# Patient Record
Sex: MALE | Employment: UNEMPLOYED | URBAN - METROPOLITAN AREA
[De-identification: names, ages, dates, MRNs, and addresses within clinical notes are randomized per-mention and may not be internally consistent; named-entity substitution may affect disease eponyms.]

---

## 2017-01-31 ENCOUNTER — COMMUNICATION - HEALTHEAST (OUTPATIENT)
Dept: FAMILY MEDICINE | Facility: CLINIC | Age: 37
End: 2017-01-31

## 2017-03-01 ENCOUNTER — COMMUNICATION - HEALTHEAST (OUTPATIENT)
Dept: FAMILY MEDICINE | Facility: CLINIC | Age: 37
End: 2017-03-01

## 2017-04-24 ENCOUNTER — TRANSFERRED RECORDS (OUTPATIENT)
Dept: HEALTH INFORMATION MANAGEMENT | Facility: CLINIC | Age: 37
End: 2017-04-24

## 2017-06-01 ENCOUNTER — COMMUNICATION - HEALTHEAST (OUTPATIENT)
Dept: FAMILY MEDICINE | Facility: CLINIC | Age: 37
End: 2017-06-01

## 2017-07-19 ENCOUNTER — COMMUNICATION - HEALTHEAST (OUTPATIENT)
Dept: FAMILY MEDICINE | Facility: CLINIC | Age: 37
End: 2017-07-19

## 2017-10-10 ENCOUNTER — COMMUNICATION - HEALTHEAST (OUTPATIENT)
Dept: FAMILY MEDICINE | Facility: CLINIC | Age: 37
End: 2017-10-10

## 2017-10-12 ENCOUNTER — TRANSFERRED RECORDS (OUTPATIENT)
Dept: HEALTH INFORMATION MANAGEMENT | Facility: CLINIC | Age: 37
End: 2017-10-12

## 2017-10-27 ENCOUNTER — COMMUNICATION - HEALTHEAST (OUTPATIENT)
Dept: SCHEDULING | Facility: CLINIC | Age: 37
End: 2017-10-27

## 2017-10-27 DIAGNOSIS — I10 ESSENTIAL HYPERTENSION: ICD-10-CM

## 2017-10-30 ENCOUNTER — COMMUNICATION - HEALTHEAST (OUTPATIENT)
Dept: FAMILY MEDICINE | Facility: CLINIC | Age: 37
End: 2017-10-30

## 2017-10-30 DIAGNOSIS — I10 ESSENTIAL HYPERTENSION: ICD-10-CM

## 2017-11-01 ENCOUNTER — OFFICE VISIT - HEALTHEAST (OUTPATIENT)
Dept: FAMILY MEDICINE | Facility: CLINIC | Age: 37
End: 2017-11-01

## 2017-11-01 DIAGNOSIS — I10 ESSENTIAL HYPERTENSION: ICD-10-CM

## 2017-11-01 DIAGNOSIS — F17.200 SMOKER: ICD-10-CM

## 2017-11-01 DIAGNOSIS — J06.9 URI (UPPER RESPIRATORY INFECTION): ICD-10-CM

## 2017-11-01 ASSESSMENT — MIFFLIN-ST. JEOR: SCORE: 1594.85

## 2017-11-07 ENCOUNTER — COMMUNICATION - HEALTHEAST (OUTPATIENT)
Dept: FAMILY MEDICINE | Facility: CLINIC | Age: 37
End: 2017-11-07

## 2017-11-15 ENCOUNTER — HOSPITAL ENCOUNTER (OUTPATIENT)
Dept: BEHAVIORAL HEALTH | Facility: CLINIC | Age: 37
Discharge: HOME | End: 2017-11-15
Attending: SOCIAL WORKER | Admitting: SOCIAL WORKER
Payer: COMMERCIAL

## 2017-11-15 PROCEDURE — H0001 ALCOHOL AND/OR DRUG ASSESS: HCPCS

## 2017-11-15 NOTE — PROGRESS NOTES
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    Do you have a preference for a particular treatment program? [facility]    Criteria for Diagnosis     Criteria for Diagnosis  DSM-5 Criteria for Substance Use Disorder  Instructions: Determine whether the client currently meets the criteria for Substance Use Disorder using the diagnostic criteria in the DSM-V pp.[id number]. Current means during the most recent 12 months outside a facility that controls access to substances    Category of Substance Severity (ICD-10 Code / DSM 5 Code)     Alcohol Use Disorder NA   Cannabis Use Disorder NA   Hallucinogen Use Disorder NA   Inhalant Use Disorder NA   Opioid Use Disorder NA   Sedative, Hypnotic, or Anxiolytic Use Disorder NA   Stimulant Related Disorder Severe   (F15.20) (304.40) Amphetamine type substance   Tobacco Use Disorder NA   Other (or unknown) Substance Use Disorder NA       Collateral Contact Summary   Number of contacts made: 1    Contact with referring person:  No.    If court related records were reviewed, summarize here: court records support client report, plus 1 disorderly conduct and numerous traffic violations (19 records total)    Information from collateral contacts supported/largely agreed with information from the client and  "associated risk ratings.      Rule 25 Assessment Summary and Plan   's Recommendation    1)  Client would benefit from outpatient treatment.  2)  Abstain from all mood-altering chemicals unless prescribed by a licensed provider.   3)  Attend weekly 12-step support group meetings.     4)  Actively work with a male sponsor or .  5)  Follow all the recommendations of your treatment/medical providers.  6)  Remain law abiding.  7)  Client will be referred to higher level of care if he cannot maintain abstinence throughout IOP treatment.      Collateral Contacts     Name:    Parish Suarez   Relationship:    girlfriend   Phone Number:    [phone] Releases:    Yes     Contact agrees that client probably uses 2-3x/week but she is not sure because he does not use around her.  She reports some strain in their relationship due to his use.  She denies knowing much about meth, having never used it herself and would possibly attend family sessions during treatment if her schedule allows.      Collateral Contacts     Name:    Jose Harding   Relationship:       Phone Number:    [phone]   Releases:    Yes     Contact reports client is a consistent user of meth and believes he requires inpatient treatment.  He consistently fails UA tests, and his last test 10/31/2017 was \"off the charts\".  He reports his last outpatient treatment referred him to a higher level of care.    Note:  Updated assessment dimensions received from [facility], the referenced treatment program, dated 11/06/2017 have no dimensions rated as 4.  Discharge summary from [facility], the program he recently left after 4 days attendance, rated client in Dimension 6 as risk 3 at intake and risk 4 at discharge.    ollateral Contacts      A problematic pattern of alcohol/drug use leading to clinically significant impairment or distress, as manifested by at least two of the following, occurring within " a 12-month period:    There is a persistent desire or unsuccessful efforts to cut down or control alcohol/drug use  Craving, or a strong desire or urge to use alcohol/drug  Recurrent alcohol/drug use resulting in a failure to fulfill major role obligations at work, school or home.  Continued alcohol use despite having persistent or recurrent social or interpersonal problems caused or exacerbated by the effects of alcohol/drug.  Important social, occupational, or recreational activities are given up or reduced because of alcohol/drug use.  Recurrent alcohol/drug use in situations in which it is physically hazardous.  Alcohol/drug use is continued despite knowledge of having a persistent or recurrent physical or psychological problem that is likely to have been caused or exacerbated by alcohol.  Withdrawal, as manifested by either of the following: The characteristic withdrawal syndrome for alcohol/drug (refer to Criteria A and B of the criteria set for alcohol/drug withdrawal).      Specify if: In early remission:  After full criteria for alcohol/drug use disorder were previously met, none of the criteria for alcohol/drug use disorder have been met for at least 3 months but for less than 12 months (with the exception that Criterion A4,  Craving or a strong desire or urge to use alcohol/drug  may be met).     In sustained remission:   After full criteria for alcohol use disorder were previously met, non of the criteria for alcohol/drug use disorder have been met at any time during a period of 12 months or longer (with the exception that Criterion A4,  Craving or strong desire or urge to use alcohol/drug  may be met).   Specify if:   This additional specifier is used if the individual is in an environment where access to alcohol is restricted.    Mild: Presence of 2-3 symptoms    Moderate: Presence of 4-5 symptoms    Severe: Presence of 6 or more symptoms

## 2017-11-16 RX ORDER — LOSARTAN POTASSIUM AND HYDROCHLOROTHIAZIDE 12.5; 5 MG/1; MG/1
1 TABLET ORAL DAILY
Status: ON HOLD | COMMUNITY
End: 2018-04-15

## 2017-11-16 ASSESSMENT — ANXIETY QUESTIONNAIRES
6. BECOMING EASILY ANNOYED OR IRRITABLE: MORE THAN HALF THE DAYS
5. BEING SO RESTLESS THAT IT IS HARD TO SIT STILL: SEVERAL DAYS
3. WORRYING TOO MUCH ABOUT DIFFERENT THINGS: MORE THAN HALF THE DAYS
1. FEELING NERVOUS, ANXIOUS, OR ON EDGE: MORE THAN HALF THE DAYS
7. FEELING AFRAID AS IF SOMETHING AWFUL MIGHT HAPPEN: NEARLY EVERY DAY
IF YOU CHECKED OFF ANY PROBLEMS ON THIS QUESTIONNAIRE, HOW DIFFICULT HAVE THESE PROBLEMS MADE IT FOR YOU TO DO YOUR WORK, TAKE CARE OF THINGS AT HOME, OR GET ALONG WITH OTHER PEOPLE: SOMEWHAT DIFFICULT
GAD7 TOTAL SCORE: 14
2. NOT BEING ABLE TO STOP OR CONTROL WORRYING: MORE THAN HALF THE DAYS

## 2017-11-16 ASSESSMENT — PAIN SCALES - GENERAL: PAINLEVEL: NO PAIN (0)

## 2017-11-16 ASSESSMENT — PATIENT HEALTH QUESTIONNAIRE - PHQ9
SUM OF ALL RESPONSES TO PHQ QUESTIONS 1-9: 8
5. POOR APPETITE OR OVEREATING: MORE THAN HALF THE DAYS

## 2017-11-16 NOTE — PROGRESS NOTES
[facility]   [address] [place] [address].  [address] [place] [id number]        ADULT CD ASSESSMENT ADDENDUM      Patient Name: Palomo Patterson  Cell Phone:   Home: [phone] (home)    Mobile:   Telephone Information:   Mobile [phone]       Email:  MIRNA  Emergency Contact: Parish Suarez   Tel: [phone]    ________________________________________________________________________      The patient is  Single, in a serious relationship    With which race do you identify?  /     Family History   Mother   Living Father   Living   No Step-mother   NA No Step-father   NA   Maternal Grandmother    Fraternal Grandmother    Maternal Grandfather     Fraternal   Grandfather    5 Sister(s)   Living 3 Brother(s)   2 living, 1    No Half-sister(s)   NA No Half-brother(s)   NA             Who raised you? (parents, grandparents, adoptive parents, step-parents, etc.)    Both Parents    Have any of your family members or significant others had problems with mental illness or substance abuse?  Please explain.    no    Do you have any children or Stepchildren? No    Are you being investigated by Child Protection Services? No    Do you have a child protection worker, probation office or ? Yes, [facility]    How would you describe your current finances?  Some money problems    If you are having problems, (unpaid bills, bankruptcy, IRS problems) please explain:  Yes, please explain: credit cards    If working or a student are you able to function appropriately in that setting? Yes    Describe your preferred learning style:  by hands-on practice    What personal strengths do you have that can help you get sober?  Sports, stay away from negative people    Do you currently self-administer your medications?  Yes    Have you ever had to lie to people important to you about how much you piña?     Yes, If yes explain: after losing  money at the casino   Have you ever felt the need to bet more and more money?     No   Have you ever attempted treatment for a gambling problem?     No   Have you ever touched or fondled someone else inappropriately or forced them to have sex with you against their will?     No   Are you or have you ever been a registered sex offender?     No   Is there any history of sexual abuse in your family?     No   Have you ever felt obsessed by your sexual behavior, such as having sex with many partners, masturbating often, using pornography often?     No   Have you ever received therapy or stayed in the hospital for mental health problems?     No   Have you ever hurt yourself, such as cutting, burning or hitting yourself?     No   Have you ever purged, binged or restricted yourself as a way to control your weight?     No   Are you on a special diet?     No   Do you have any concerns regarding your nutritional status?     No   Have you had any appetite changes in the last 3 months?     No   Have you had any weight loss or weight gain in the last 3 months?    If weight patient gained or lost was more than 10 lbs, then refer to program RN / attending Physician for assessment.     No   Was the patient informed of BMI?    Above,  General nutrition education     Yes   Do you have any dental problems?     No   Have you ever lived through any trauma or stressful life events?     Yes, If yes explain: death of brother   In the past month, have you had any of the following symptoms related to the trauma listed above? (dreams, intense memories, flashbacks, physical reactions, etc.)     No   Have you ever believed people were spying on you, or that someone was plotting against you or trying to hurt you?     No   Have you ever believed someone was reading your mind or could hear your thoughts or that you could actually read someone's mind or hear what another person was thinking?     No   Have you ever believed that someone of some force  outside of yourself was putting thoughts into your mind or made you act in a way that was not your usual self?  Have you ever though you were possessed?     No   Have you ever believed you were being sent special messages through the TV, radio or newspaper?     No   Have you ever heard things other people couldn't hear, such as voices or other noises?     No   Have you ever had visions when you were awake?  Or have you ever seen things other people couldn't see?     No       Suicide Screening Questions:   1. Are you feeling hopeless about the present/future?     No   2. Have you ever had thoughts about taking your life?     No   3. When did you have these thoughts?     NA   4. Do you have any current intent or active desire to take your life?     No   5. Do you have a plan to take your life?     No   6. Have you ever made a suicide attempt?     No   7. Do you have access to pills, guns or other methods to kill yourself?     No     Guide to Risk Ratings   IDEATION: Active thoughts of suicide? INTENT: Intent to follow on suicide? PLAN: Plan to follow through on suicide? Level of Risk:   IF Yes Yes Yes Patient = High Emergent   IF Yes Yes No Patient = High Urgent/Non-Emergent   IF Yes No No Patient = Moderate Non-Urgent   IF No No   No Patient = Low Risk   The patient's ADDITIONAL RISK FACTORS and lack of PROTECTIVE FACTORS may increase their overall suicide risk ratings.     Patient's Responses (within the last 30 days)   IDEATION: Active thoughts of suicide?    No     INTENT: Intent to follow on suicide?    No     PLAN: Plan to follow through on suicide?    No     Determining the level of risk depends on the patient responses, suicide risk factors and protective factors.     Additional Risk Factors: History of impulsive or aggressive behaviors  Significant legal problems including being at risk of incarceration   Protective Factors:  Having people in his/her life that would prevent the patient from considering  "committing suicide (i.e. young children, spouse, parents, etc.)  An absence of mental health issues or stable and well treated mental health issues  An absence of chronic health problems or stable and well treated chronic health issues  A positive relationship with his/her clinical medical and/or mental health providers  Having easy access to supportive family members  Having cultural, Anabaptism or spiritual beliefs that discourage suicide     Risk Status   Emergent? No   Urgent / Non-Emergent? No   Present / Non- Urgent? No    Low Risk? Yes, Evaluation Counselors - Document in Epic / SBAR to counselor \"No identified risk\" and Treatment Counselors - Assess weekly in progress notes under Dimension 3 and summarize in Discharge / Treatment summary under Dimension 3.   Additional information to support suicide risk rating: See Above       Mental Health Status   Physical Appearance/Attire: Appears stated age   Hygiene: well groomed   Eye Contact: at examiner   Speech Rate:  regular   Speech Volume: regular   Speech Quality: fluid   Cognitive/Perceptual:  reality based   Cognition: memory intact    Judgment: intact and able to concentrate   Insight: intact and able to concentrate   Orientation:  time, place, person and situation   Thought::   logical    Hallucinations:  none   General Behavioral Tone: cooperative   Psychomotor Activity: no problem noted   Gait:  no problem   Mood: appropriate   Affect: congruence/appropriate   Counselor Notes: NA       Criteria for Diagnosis: DSM-5 Criteria for Substance Use Disorders      Amphetamine Use Disorder Severe - 304.40 (F15.20)      Level of Care   I.) Intoxication and Withdrawal: 0   II.) Biomedical:  0   III.) Emotional and Behavioral:  1   IV.) Readiness to Change:  2   V.) Relapse Potential: 3   VI.) Recovery Environmental: 3       Initial Problem List     The patient lacks relapse prevention skills  The patient has poor coping skills  The patient has poor refusal skills "   The patient lacks a sober peer support network  The patient has current legal issues    Patient/Client is willing to follow treatment recommendations.  Yes    Counselor: Tray Parker, [facility]          Vulnerable Adult Checklist for OUTPATIENTS     1.  Do you have a physical, emotional or mental infirmity or dysfunction?       No    2.  Does this issue impair your ability to provide for your own care without help, including providing yourself with food, shelter, clothing, healthcare or supervision?       No    3.  Because of this issue, I need assistance to protect myself from maltreatment by others.      No    Based on the above information:    This person is not a functional Vulnerable Adult according to [place] Statute 626.5572 subdivision 21.

## 2017-11-17 ASSESSMENT — ANXIETY QUESTIONNAIRES: GAD7 TOTAL SCORE: 14

## 2017-11-18 NOTE — PROGRESS NOTES
CHEMICAL DEPENDENCY EVALUATION      DATE OF SERVICE: 11/15/2017      EVALUATION COUNSELOR:  ZARI Sotelo   PATIENT'S ADDRESS:  [address] [place] [address].   TELEPHONE:  [phone].   STATISTICS:  YOB: 1980.  Age: 37.  Sex:  Male.  Marital Status:  Single, in a serious relationship.   INSURANCE:  [facility].   REFERRAL SOURCE:  [facility].      REASON FOR EVALUATION:  Client Palomo Patterson reports he has had dirty UA's so probation requires treatment.      HEALTH HISTORY AND MEDICATIONS:  Client reports medical conditions of high blood pressure and high cholesterol.  He reports compliance with prescribed medications.  Client denies any mental health diagnoses or therapy.      HISTORY OF PREVIOUS TREATMENT AND COUNSELING:  Client reports no detox experience and 4-5 treatments for chemical dependency with 2 completions.      HISTORY OF ALCOHOL AND DRUG USE:  Client's first use of alcohol was age 15, drinking small amounts occasionally.  In the past year, he has drank 1 beer every 3-5 months at social events with the last use date of 09/2017.  Client first smoked marijuana at age 16 and used occasionally, last date of use 2008.  Client began using methamphetamine at age 23 and started with half a gram 1-2 times per week, usually on weekends.  Use increased in the next 2-3 years to 1 gram 3-4 times a week.  After release from long-term, use increased to 1/4 ounce per week.  In the past year he has used almost 1 gram once or twice per week.  Last use date was 11/12/2017.      SUMMARY OF CHEMICAL DEPENDENCY SYMPTOMS ACKNOWLEDGED BY THE PATIENT:  The patient identifies with 8 out of the 11 DSM-5 criteria for impression of a substance use disorder.      SUMMARY OF COLLATERAL DATA:  Releases of information were obtained from his girlfriend and .  Girlfriend was interviewed and largely confirmed information given by client.   reports client is a consistent user  of meth and believes he requires inpatient treatment.        MENTAL STATUS ASSESSMENT:  Physical appearance and attire:  Client appears stated age and his attire is appropriate to his age and situation.  His hygiene is well groomed.  His eye contact was at the examiner.  His speech rate and volume were regular.  His speech quality was fluid.  Cognitive perceptual: reality based.  Cognition appears memory intact.  Judgment appears intact and able to concentrate.  Insight appears intact and able to concentrate.  His orientation is to time, place, person and situation.  His thought is logical.  He has no hallucinations.  He had a generally cooperative general behavioral tone.  There was no problem noted with his psychomotor activity or his gait.  His mood was appropriate and his affect was congruent and appropriate.      VULNERABLE ADULT ASSESSMENT:  This person is not a functional vulnerable adult  according to [place] statute 626.5572, subdivision 21.      DIAGNOSTIC IMPRESSION:  Stimulant-related disorder, severe, F15.20/304.40, amphetamine type substance.      [facility] PLACEMENT CRITERIA:     DIMENSION 1:  Intoxication/Withdrawal:  Risk level 0.  Client reports last use of methamphetamine 11/12/2017.  Client exhibits no signs of intoxication or withdrawal.       DIMENSION 2:  Biomedical Conditions:  Risk level 0.  Client has a medical condition which would not interfere with treatment.  He has a primary care provider and reports compliance with medications.  Client has medical insurance and appears able to navigate the healthcare system independently.       DIMENSION 3:  Emotional/Behavioral:  Risk level 1.  Client denies any mental health diagnosis or therapy.  His PHQ-9 score equals 8 and his HOLLI-7 score equals 14.  Client reports increased anxiety due to his mother's poor health.  Client denies current suicidal ideation, intent, or plan.      DIMENSION 4:  Readiness to Change:  Risk level 2.  Client verbalizes  motivation to change, but lacks consistent behaviors as evidenced by continued positive test screens for methamphetamine.  Client has active legal reinforcement.      DIMENSION 5:  Relapse and Continued Use Potential:  Risk level 3.  Client has 4-5 prior treatment experiences and has completed 2.  He reports an 8-month period of sobriety but admits he was subject to testing during that time.  Despite negative consequences in several life areas, he has continued to use.  Client appears to lack adequate sober coping skills, impulse control skills, and long-term sober maintenance skills.  Client appears to be at a moderately high risk for relapse recidivism.      DIMENSION 6:  Recovery Environment:  Risk level 3.  Client has not been employed for several years.  Client's family and girlfriend are supportive.  He has attended sober support meetings but has discontinued.  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  Client appears to lack structured meaningful activities and a sober support network.  Client has significant legal involvement and is currently on parole.      INITIAL PROBLEM LIST:  The client lacks relapse prevention skills.  The client has poor coping skills.  The client has poor refusal skills.  The client lacks a sober peer support network.  The client has current legal issues.      RECOMMENDATIONS:   1.  Client recommended to attend [facility] evening program in [place].   2.  Abstain from all mood-altering chemicals unless prescribed by a licensed provider.   3.  Attend weekly 12-step support group meetings.   4.  Actively work with a male sponsor or .   5.  Follow all recommendations of your treatment/medical providers.   6.  Remain law abiding.     7.  Client will be referred to a higher level of care if he cannot maintain abstinence throughout IOP treatment.      RATIONALE:  Client meets criteria for substance use disorder and is  referred for continued services.  Client appears to be motivated for recovery.         This information has been disclosed to you from records protected by Federal confidentiality rules (42 CFR part 2). The Federal rules prohibit you from making any further disclosure of this information unless further disclosure is expressly permitted by the written consent of the person to whom it pertains or as otherwise permitted by 42 CFR part 2. A general authorization for the release of medical or other information is NOT sufficient for this purpose. The Federal rules restrict any use of the information to criminally investigate or prosecute any alcohol or drug abuse patient.      MACARIO PEREIRA [facility]             D: 2017 14:47   T: 2017 13:14   MT: NONI      Name:     JAVI CASTELLANO   MRN:      [id number]        Account:      [id number]   :      1980           Visit Date:   11/15/2017      Document: [id number]

## 2017-11-29 ENCOUNTER — BEH TREATMENT PLAN (OUTPATIENT)
Dept: BEHAVIORAL HEALTH | Facility: CLINIC | Age: 37
End: 2017-11-29
Attending: FAMILY MEDICINE

## 2017-11-29 ENCOUNTER — HOSPITAL ENCOUNTER (OUTPATIENT)
Dept: BEHAVIORAL HEALTH | Facility: CLINIC | Age: 37
End: 2017-11-29
Attending: SOCIAL WORKER
Payer: COMMERCIAL

## 2017-11-29 PROBLEM — F19.10 SUBSTANCE ABUSE (H): Status: ACTIVE | Noted: 2017-11-29

## 2017-11-29 PROCEDURE — H2035 A/D TX PROGRAM, PER HOUR: HCPCS | Mod: HQ

## 2017-11-30 NOTE — PROGRESS NOTES
Outcome of vulnerable Adult Assessment for Outpatients:    1.  Do you have a physical, emotional or mental infirmity or dysfunction?       No    2.  Does this issue impair your ability to provide for your own care without help, including providing yourself with food, shelter, clothing, healthcare or supervision?       No      3.  Because of this issue, I need assistance to protect myself from maltreatment by others.      No    Based on the above information:    This person is not a functional Vulnerable Adult according to [place] Statute 626.5572 subdivision 21.      ZARI Long

## 2017-11-30 NOTE — PROGRESS NOTES
"Orientation to [facility]  Date 11/29/2017  Time: 7:30pm Duration: 1 hour    D - Client attended orientation on this date by himself. Client was given an orientation packet which was reviewed with in it's entirety. The following was specifically reviewed with the client: Program philosophy, treatment goals, program schedules, education components, the family program, using treatment materials, program rules, client rights/responsibilities, information on HIV, STDS, Hepatitis, TB, information on use while pregnant, opioid information and Narcan Information, program abuse prevention plan/reporting protocol, client grievance procedure, risks of treatment, confidentiality, treatment agreement, facility tour/safety information and information on co-occurring disorders. Client was given information about insurance and the central billing office phone number if there are any further questions. Client was also given information on Stages of Change and PAW (post-acute withdrawal). Client was given counselor's number and also manager's number if there are further concerns. Client asked counselor to fill out a sheet for food stamps. Counselor is looking into if it can be filled out or needs to be a medical provider.     I - Client signed paperwork, toured the facility, scheduled first group Phase 1 session and discussed treatment planning. Client also filled out goals for treatment. They include:  1) \"anger\"  2) \"sober\"  3) \"low self-esteem/job\"    A - Client was alert and engaged during orientation.      P- Client will review paperwork and materials.  Client will come ready and prepared for his first session on 12/04 at 5:30pm  Client will set up a time for treatment planning.      ZARI Long      "

## 2017-12-04 ENCOUNTER — HOSPITAL ENCOUNTER (OUTPATIENT)
Dept: BEHAVIORAL HEALTH | Facility: CLINIC | Age: 37
End: 2017-12-04
Attending: SOCIAL WORKER
Payer: COMMERCIAL

## 2017-12-04 PROCEDURE — H2035 A/D TX PROGRAM, PER HOUR: HCPCS | Mod: HQ

## 2017-12-04 NOTE — PROGRESS NOTES
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 Client reports last date of use as 2017. Client reports withdrawal symptoms in past 12 months and past 30 days. Client denies any lifetime detox admissions. No other concerns in this dimension at this time.     Dimension Two: Biomedical Condition and Complications    Ratin   (Consider the degree to which any physical disorder would interfere with treatment for substance abuse, and the client's ability to tolerate any related discomfort; determine the impact of continued chemical use on the unborn child if the client is pregnant)     Client reports having HBP and high cholesterol. Client has a primary care physician through [facility], Dr. Martini. Client reports last appt was in 2017. Client reports he currently has a cough/cold/stuffy nose. Client is able to obtain services as needed. Client reports being prescribed medications for his biomedical conditions and takes them as prescribed. No other concerns in this dimension at this time.     Dimension Three: Emotional/Behavioral/Cognitive Conditions & Complications    Ratin   (Determine the degree to which any condition or complications are likely to interfere with treatment for substance abuse or with functioning in significant life areas and the likelihood of risk of harm to self or others)     Client denies any mental health diagnosis. Client denies past or current therapy. Client reports  having increased anxiety worrying about his mother's health declining. Client denies any current SI, SIB, intent or plans. Client's additional risk factors include history of impulsive or aggressive behavior and significant legal problems including being at risk of incarceration. Client's protective factors include having peopled in his life that would prevent the client from considering committing suicide, absence of mental health issues, absence of chronic health problems, having easy access to supportive family members and having cultural, Yazidi or spiritual beliefs that discourage suicide. No other concerns in this dimension at this time.     Dimension Four: Treatment Acceptance/Resistance     Ratin   (Consider the amount of support and encouragement necessary to keep the client involved in treatment)     Client reports he has a problem with substances. Client verbalizes motivation but lacks consistent behaviors. Client has active legal reinforcement. Client's UAs have with probation have been positive for meth. Client appears in the contemplation stage of change. No other concerns at this time.     Dimension Five: Continued Use/Relapse Prevention     Rating: 3   (Consider the degree to which the client's recognizes relapse issues and has the skills to prevent relapse of either substance use or mental health problems)    Client reports an 8 month period of sobriety in his lifetime. Client reports he was accountable to random testing at that time. Client has previous tx, and completed half of them. Client has continued negative consequences due to his continued use. Client reports past sober support meetings but denies any current meetings. Client lacks sober coping and living skills and relapse prevention skills. No other concerns in this dimension at this time.    Dimension Six: Recovery Environment     Rating: 3     (Consider the degree to which key areas of the client's life are supportive of or  antagonistic to treatment participation and recovery)     Client reports he is unemployed. Client has been unemployed for several years due to his use. Client reports his family and his girlfriend are supportive of him. 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 Client lacks any structured meaningful daily activities. Client lacks a sober support group outside of his family. Client is current on parole, as he has past charges:  2005 3rd degree drug poss, 2-5th degree poss firearm;  2010 3-2nd drug poss, felony poss firearm; as a former gang member he explained he was carrying weapons for self-protection. No other concerns in this dimension at this time.     I have reviewed the information on the assessment, psychosocial and medical history and checklist:        it is current    ZARI Long

## 2017-12-05 ENCOUNTER — BEH TREATMENT PLAN (OUTPATIENT)
Dept: BEHAVIORAL HEALTH | Facility: CLINIC | Age: 37
End: 2017-12-05

## 2017-12-05 NOTE — TREATMENT PLAN
[facility]  Adult Chemical Dependency Program  Treatment Plan Requirements    These services are provided by the facility for each patient/client according to the individual's treatment plan:    Individual and group counseling    Education    Transition services    Services to address any co-occurring mental illness    Service coordination    Initial Treatment Plan Goals:  1. Complete all the requirements of Program Orientation.  2. Maintain medication compliance throughout the program.  3. Complete requirements for workshop/skills groups based on identified issues on your problem list.  4. Complete the support group attendance feedback sheet weekly.  5. Gain family involvement in treatment process to address family issues from the problem list.  6. Attend and participate in all required groups per individual treatment plan.  7. Focus attention to individualized issues from the treatment plan.  8. Complete all requirements for UA's, alcohol screening tests and other testing.  9. Schedule a physical examination if recommended.    In addition to the above, complete all individual goals as specifically outlines on your treatment plan.    Criteria for discharge:  Patients/clients are discharged from the program following completion of the entire program including Phase I and II or acceptance of other post-treatment referrals such as jail house, or aftercare at other facilities.  Patients/clients may also be discharged for inappropriate behavior or chemical use.      Favorable Discharge - Patients/clients have completed agreed upon treatment goals, understand their diagnosis and appear motivated about the follow-up care.    Guarded Discharge - Patients/clients have demonstrated some understanding of their diagnosis and recovery process, and have completed some of their treatment goals.  This prognosis also includes patients/clients who have completed some treatment goals but have not made  commitment to community support or follow through with referrals.    Unfavorable Discharge - Patients/clients have not completed agreed upon treatment goals due to their own choice, have limited understanding of their diagnosis, and have shown minimal or inconsistent behavior conducive to recovery.  Those patients/clients discharged due to behavioral problems will also be unfavorable discharges.                                  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  Last use date was reported as 12/07/2017 for meth.  New date 01/01/2018.   Goal: Develop effective strategies to maintain sobriety.    Intervention: Report to counselor and group any alcohol or drug use. 04/17/18 MP Refused   Client did not complete treatment. 01/09/2018         Biomedical Conditions and Complaints     DIMENSION 2  RISK FACTOR: 0             AssignmentDate Source Prob/Goal/  Intervention Target  Date Initials Outcome Completion  Date   12/05/2017 Self -  Current, History -  Current and Assessment -  Current  Problem: Client has a medical diagnosis of HBP and high cholesterol.  Goal: Follow recommendations of medical provider.  Intervention: Continue to take prescribed medications and follow-up with medical interventions while in program. 04/2018 MP Refused   Client did not complete treatment. 01/09/2018  "      -----------------------------------------------------------------------------------------------------------------    Emotional/Behavioral/Cognitive Conditions and Complications     DIMENSION 3  RISK FACTOR: 1             AssignmentDate Source Prob/Goal/  Intervention Target  Date Initials Outcome Completion  Date   12/05/2017 Self -  Current, History -  Current and Assessment -  Current  Problem: Reports feelings of anxiety/worry.     Goal: Understand the relationship between addiction and mental health issues.   Goal: Learn skills to express anxiety/worry in a healthy and appropriate way.  Intervention:  Identify 5 coping skills to reduce anxiety.  Practice techniques daily and when needed.  Intervention 2:  List 5 ways your addiction has impacted your mental health.    Intervention 3:  Client will complete and present \"My Anxiety Profile\"  Intervention 4: Come to group daily with a positive affirmation.                           01/17/18 01/17/18 01/17/18        Daily thru 04/2018 OTONIEL Refused   Client did not complete treatment.                    MIRNA BRADLEY 01/09/2018 12/12/2017 Self -  Current, History -  Current and Assessment -  Current Problem: \"anger\"  Goal: Identify anger triggers and coping skills to deal with anger.   Intervention:  Client will complete and present \"Is my Anger met due to Unmet Expectations\"  Intervention 2:  Client will attend Emotional Management Skills group.            01/10/18          JOSE JUAN FREDERICK Client did not complete treatment.      MIRNA BRADLEY 01/09/2018     -------------------------------------------------------------------------------------------------------------------     Readiness to Change     DIMENSION 4  RISK FACTOR: 2             AssignmentDate Source Prob/Goal/  Intervention Target  Date Initials Outcome Completion  Date   12/05/2017 Self -  Current, History -  Current and Assessment -  Current  Problem: Client lacks " "internal motivation to stop using substances.    Goal: Understand the impact your substance use has had on you, your family and significant relationships.  Goal: Increase internal motivation. Goal: Connect with your spirituality.  Intervention: Present using history, consequences of use and 5 values violated.  Intervention 2:  Invite family and concerned persons to family program.  Intervention 3:  Participate in spiritual care groups.   Intervention 4: Each week write down 3 reasons you want to remain sober.                             12/20/17 12/18 & 12/20/17 12/12/17      1x a week through 04/2018 MP Refused                             NA            NA        NA      NA             01/09/2018     -------------------------------------------------------------------------------------------------------------------     Relapse/Continues Use/Continues Problem Potential     DIMENSION 5  RISK FACTOR: 4                 AssignmentDate Source Prob/Goal/  Intervention Target  Date Initials Outcome Completion  Date   12/05/2017 Self -  Current, History -  Current and Assessment -  Current  Problem: Lacks a daily routine that includes healthy and sober living skills.    Goal: Develop sober coping and living skills. Identify personal triggers and relapse warning signs.  Intervention: Complete the triggers and cravings assignment and include alternative behaviors.  Intervention 2:  Identify and begin attending sober support groups - min of 5                      12/27/17 02/2018 MP Refused   Client did not complete treatment.              MIRNA BRADLEY 01/09/2018 12/12/2017 Self -  Current, History -  Current and Assessment -  Current Problem: History of relapse and self-sabotage.  Goal: Identify and understand personal relapse and self-sabotage patterns.  Intervention:  Client will read \"Early Warning Signs of Relapse\" and discuss in group.  Intervention 2:  Client will work on a Relapse " "Prevention Plan with the group.                 01/24/18 12/11/17 MP                 NA          Completed                 NA          12/11/2017     Recovery Environment     DIMENSION 6  RISK FACTOR: 3                 AssignmentDate Source Prob/Goal/  Intervention Target  Date Initials Outcome Completion  Date   12/05/2017 Self -  Current, History -  Current and Assessment -  Current  Problem: Lacks sober support network. Client lacks sober leisure activities.     Goal: Develop a sober support network.   Goal: Develop boundaries.  Intervention: Complete the personal \"Recovery Care Assignment\"   1) worksheet  2) RCP  Intervention 2:   List 10 situations, people, emotional responses that are unhealthy.  Develop a plan to avoid or manage them.                       Given in Phase 2        01/10/18 MP Refused                     NA            NA 01/09/2018 12/12/2017 Self -  Current, History -  Current and Assessment -  Current Problem: Client has legal involvement.  Goal: Follow conditions of legal involvement.  Intervention: Follow conditions of parole, complete UAs and meetings with .  04/2018 MP Client did not complete treatment. 01/09/2018     Individual abuse prevention plan (required for lodging plus) : specific actions, referral:   No additional protection measures required other than the Program Abuse Prevention Plan - No     All interventions that are designated as  current  will need to be completed in order to transition out of treatment with a favorable prognosis.  The treatment plan is a flexible document and a work in progress.  Interventions and goals may be added at any time to customize plan to each individual s needs.  Client may work with counselor to change interventions as long as they pertain to the goals stipulated in the plan and/or are clinically driven.     ZARI Long      "

## 2017-12-11 ENCOUNTER — HOSPITAL ENCOUNTER (OUTPATIENT)
Dept: BEHAVIORAL HEALTH | Facility: CLINIC | Age: 37
End: 2017-12-11
Attending: SOCIAL WORKER
Payer: COMMERCIAL

## 2017-12-11 PROCEDURE — H2035 A/D TX PROGRAM, PER HOUR: HCPCS | Mod: HQ

## 2017-12-12 ENCOUNTER — HOSPITAL ENCOUNTER (OUTPATIENT)
Dept: BEHAVIORAL HEALTH | Facility: CLINIC | Age: 37
End: 2017-12-12
Attending: SOCIAL WORKER
Payer: COMMERCIAL

## 2017-12-12 PROCEDURE — H2035 A/D TX PROGRAM, PER HOUR: HCPCS | Mod: HQ

## 2017-12-12 NOTE — PROGRESS NOTES
Acknowledgement of Current Treatment Plan       I have reviewed my treatment plan and safety plan with my therapist / counselor on 12/12/2017. I agree with the plan as it is written in the electronic health record.    Last date of use reported as 12/07/2017    I understand I can be discharged with 2 unexcused absences.     Name Signature   Palomo العلي Leonardo    Name of Therapist / Counselor    Estela Pulido [facility]

## 2017-12-12 NOTE — PROGRESS NOTES
Patient Safety Plan Template    Name:   Palomo Patterson YOB: 1980 Age:  37 year old MR Number:  [id number]   Step 1: Warning signs (Thoughts, images, mood, situation, behavior) that a crisis may be developin. Raising of Voice     2. Making a fist     3. Being Physical and Swearing      Step 2: Internal coping strategies - Things I can do to take my mind off of my problems without contacting another person (relaxation technique, physical activity):     1. Walk away and take a time out     2. Talk to someone else and ask for advice     3. Count to 10 and relax     Step 3: People and social settings that provide distraction:     1. Name:  - in [facility] off [address]   Phone: NA   2. Name: Sylvain Lugo   Phone: [phone]   3. Place: Denominational   4. Place: Playing soccer - indoor     The one thing that is most important to me and worth living for is: myself     Step 4: People whom I can ask for help:     1. Name:    Phone: NA     2. Name: Sylvain Lugo   Phone: [phone]     3. Name: Sister Daniel Sherman    Phone: [phone]     Step 5: Professionals or agencies I can contact during a crisis:     1. Clinician Name: Dr. Parker   Phone: [phone]   Clinician Pager or Emergency Contact #: NA     2. Clinician Name: MIRNA   Phone: MIRNA     Clinician Pager or Emergency Contact #: NA     3. Local Urgent Care Services: [facility]     Urgent Care Services Address: [address] [place] [address]    Urgent Care Services Phone: [phone]     4. Suicide Prevention Lifeline Phone: 1-472-925-TALK ([phone])     Step 6: Making the environment safe:     1. Staying away from negative people     2. Stay away from bars/clubs     Safety Plan Template 2008 Parisa Ricardo and Saud Teran is reprinted with the express permission of the authors.  No portion of the Safety Plan Template may be reproduced without the express, written permission.  You can contact the authors at [email or web address]  or [email or web address].

## 2017-12-13 ENCOUNTER — HOSPITAL ENCOUNTER (OUTPATIENT)
Dept: BEHAVIORAL HEALTH | Facility: CLINIC | Age: 37
End: 2017-12-13
Attending: SOCIAL WORKER
Payer: COMMERCIAL

## 2017-12-13 PROCEDURE — H2035 A/D TX PROGRAM, PER HOUR: HCPCS | Mod: HQ

## 2017-12-14 NOTE — PROGRESS NOTES
"  CD ADULT Progress Note     Treatment Plan Review completed on:  12/14/2017    Attendance Dates: 12/12/2017, 12/13/2017, 12/14/2017    Total # of Group Sessions(including orientation):  05 MONDAY TUESDAY WEDNESDAY THURSDAY FRIDAY SATURDAY SUNDAY Total   Group Therapy x  x     4 hours   Specialty Groups*  x      2 hours   1:1           Family Program           [place]             Phase II             Absent           Total x x x     6 hours     *Specialty Groups include Mental Health Care, Assertiveness and Communication, Sobriety Maintenance Skills, Spiritual Care, Stress Management, Relapse Prevention, Family Systems.                    Learning Style:  Hands on    Staff member contributing:  ZARI Long    Received supervision/consultation:  Yes - VM were left between counselor and PO    Client:  contributed to goals and plan    Did Client receive a copy of treatment plan/revised plan:  No    Changes to Treatment Plan:  No    Client agrees with plan/revised plan:  No    Any changes in Vulnerable Adult Status:  No    Substance Use Disorders:  Stimulant Related Disorder Severe (F15.20)  Amphetamine type substance      ASAM Risk Ratings and Data       DIMENSION 1: Acute Intoxication/Withdrawal  The client's ability to cope with withdrawal symptoms and current state of intoxication       Acute Intoxication/Withdrawal - Current Risk Factor:  0    Reporting sober date of New Date: 12/07/2017    Goals: Develop effective strategies to maintain sobriety.    Data: Client reports his last date of use of meth as 12/07/2017. Client reported nothing lead to his use \"just does it sometimes.\" Client denies PAWS. Client reported his PO knows about his use. According to his PO, client did a UA on 12/11 and the levels for meth were \"off the charts.\" Will continue to monitor and if levels do not decrease, will refer to higher level of care.     DIMENSION 2:  Biomedical Conditions and Complaints  The degree to which any " physical disorder would interfere with treatment for substance abuse and the client's ability to tolerate any related discomfort     Biomedical Conditions and Complaints - Current Risk Factor:  0    Goals: Follow recommendations of medical provider.    Data:  Client reported feeling ill last week. Client had residual effects of his cold, as he was still coughing and having a runny nose, but reported feeling better.  Client is able to obtain medical services as needed and was advised to do so if illness progresses. No other concerns at this time.     DIMENSION 3:  Emotional/Behavioral/Cognitive Conditions and Complications  The degree to which any condition or complications are likely to interfere with treatment for substance abuse or with function in significant life areas and the likelihood of risk of harm to self or others.     Emotional/Behavioral - Current Risk Factor:  1    DSM-5 Diagnoses:   None     Suicide Assessment:  Risk Status    Ideation - Active thoughts of suicide Intent to follow through on suicide Plan for completing suicide    Yes No Yes No Yes No   Emergent  x  x  x   Urgent / Non-Emergent  x  x  x   Non- Urgent  x  x  x   No Current/Active Risk  x  x  x      Goals: Understand the relationship between addiction and mental health issues. Identify anger triggers and coping skills to deal with anger.     Data:  Client denies a mental health diagnosis. Client reports experiencing disturbed sleep, fatigue, depression and anxiety. Client did not report what caused his symptoms but reported he found something to do and occupy his time. Client rated himself a 7/10 (10 greatest). Client denies thoughts of self harm.     DIMENSION 4:  Readiness to Change  Consider the amount of support and encouragement necessary to keep the client involved in treatment.     Readiness to Change - Current Risk Factor:  2    Goals: Understand the impact your substance use has had on you, your family and significant  "relationships. Increase internal motivation.   Goal: Connect with your spirituality.   - completed 12/12    Data:  Client reports his motivation for sobriety an 8 out 10 (10 greatest). Client reports he is motivated \"a better tomorrow and one day at a time\". Client has external motivation due to current legal issues.      DIMENSION 5:  Relapse/Continued Use/Continued Problem Potential  Consider the degree to which the client recognizes relapse issues and has the skills to prevent relapse of either substance use or mental health problems.     Relapse/Continued Use/Continued Problem Potential - Current Risk Factor:  3    Goals: Develop sober coping and living skills. Identify personal triggers and relapse warning signs. Identify and understand personal relapse and self-sabotage patterns.    Data:  Client rated his cravings and triggers at a 2/10 (10 used). Client reports stress and anxiety was triggers this week. Client did not report how he dealt with his cravings and triggers.     DIMENSION 6:  Recovery Environment  Consider the degree to which key areas of the client's life are supportive of or antagonistic to treatment participation and recovery.     Recovery Environment - Current Risk Factor:  3    Support group attended this week:  No    Did family agree to attend family week:  No    If yes:  none schedule this week    Goals:  Develop a sober support network. Develop boundaries.  Follow conditions of legal involvement.    Data:  Client reports his living situation is good and supportive. Client rates his situation a 1 out of 10 (10 stressful). Client reports his girlfriend and family are supportive of him. Client reports attending [employer] this week. Client rates his motivation to attend a sober support meeting a 1/10 (10 highest). Client is currently on parole, met with  on 12/11/2017. Client is currently not employed.         Intervention:  Therapy Group Check In, Behavioral Therapy, Cognitive " Behavioral Therapy, Counselor Feedback, Education, Emotional management, Group Feedback, Motivational Enhancement Therapy, Relapse Prevention, Twelve Step Facilitation, Mental Health Education, Spiritual Care, Communication Skills    Client completed a Relapse Prevention and Action Plan. Client identified high trigger situations and how to avoid them. Client identified who he can contact when needing help and to get out of an uncomfortable situation. Client created a self portrait and identified 10 positives about himself.     Assessment:  Stages of Change Model  Contemplation    Client and counselor discussed his use, and if he continues to use we will need to start looking at a higher level of care. We discussed how he needs to show periods of sobriety. Client agreed. Client appears to want sobriety, but lacks the skills to remain sober.     Plan:    Seek medical attention if needed.  Present first assignment next week.   Continue UAs through [facility], attend [facility] meetings.   Find a sober support meeting to attend.     ZARI Long

## 2017-12-19 ENCOUNTER — HOSPITAL ENCOUNTER (OUTPATIENT)
Dept: BEHAVIORAL HEALTH | Facility: CLINIC | Age: 37
End: 2017-12-19
Attending: SOCIAL WORKER
Payer: COMMERCIAL

## 2017-12-19 PROCEDURE — H2035 A/D TX PROGRAM, PER HOUR: HCPCS | Mod: HQ

## 2017-12-20 ENCOUNTER — HOSPITAL ENCOUNTER (OUTPATIENT)
Dept: BEHAVIORAL HEALTH | Facility: CLINIC | Age: 37
End: 2017-12-20
Attending: SOCIAL WORKER
Payer: COMMERCIAL

## 2017-12-20 PROCEDURE — H2035 A/D TX PROGRAM, PER HOUR: HCPCS | Mod: HQ

## 2017-12-21 NOTE — PROGRESS NOTES
CD ADULT Progress Note     Treatment Plan Review completed on:  12/21/2017    Attendance Dates: 12/18/2017 (excused), 12/19/2017, 12/20/2017    Total # of Group Sessions(including orientation):  07 MONDAY TUESDAY WEDNESDAY THURSDAY FRIDAY SATURDAY SUNDAY Total   Group Therapy   x     1 hours   Specialty Groups*  x      2 hours   1:1           Family Program           [place]             Phase II             Absent x          Total x x x     3 hours     *Specialty Groups include Mental Health Care, Assertiveness and Communication, Sobriety Maintenance Skills, Spiritual Care, Stress Management, Relapse Prevention, Family Systems.                    Learning Style:  Hands on    Staff member contributing:  ZARI Long    Received supervision/consultation:  Yes - VM were left between counselor and PO    Client:  contributed to goals and plan    Did Client receive a copy of treatment plan/revised plan:  No    Changes to Treatment Plan:  No    Client agrees with plan/revised plan:  No    Any changes in Vulnerable Adult Status:  No    Substance Use Disorders:  Stimulant Related Disorder Severe (F15.20)  Amphetamine type substance      ASAM Risk Ratings and Data       DIMENSION 1: Acute Intoxication/Withdrawal  The client's ability to cope with withdrawal symptoms and current state of intoxication       Acute Intoxication/Withdrawal - Current Risk Factor:  0    Reporting sober date of New Date: 12/07/2017    Goals: Develop effective strategies to maintain sobriety.    Data: Client reports his last date of use of meth as 12/07/2017. Client denies experiencing PAW symptoms.     DIMENSION 2:  Biomedical Conditions and Complaints  The degree to which any physical disorder would interfere with treatment for substance abuse and the client's ability to tolerate any related discomfort     Biomedical Conditions and Complaints - Current Risk Factor:  0    Goals: Follow recommendations of medical provider.    Data:   "Client reports feeling better this week. Client denies any flu symptoms. Client is able to obtain medical services as needed.     DIMENSION 3:  Emotional/Behavioral/Cognitive Conditions and Complications  The degree to which any condition or complications are likely to interfere with treatment for substance abuse or with function in significant life areas and the likelihood of risk of harm to self or others.     Emotional/Behavioral - Current Risk Factor:  1    DSM-5 Diagnoses:   None     Suicide Assessment:  Risk Status    Ideation - Active thoughts of suicide Intent to follow through on suicide Plan for completing suicide    Yes No Yes No Yes No   Emergent  x  x  x   Urgent / Non-Emergent  x  x  x   Non- Urgent  x  x  x   No Current/Active Risk  x  x  x      Goals: Understand the relationship between addiction and mental health issues. Identify anger triggers and coping skills to deal with anger.     Data:  Client denies a mental health diagnosis. Client reports experiencing disturbed sleep 3/10, fatigue 5/10, difficulty concentrating 5/10, memory problems 2/10, mood swings 3/10, irritability 4/10, depression 3/10 and anxiety 4/10 (10 greatest).Client reports he \"drinks water to control my fatigue and do things slower\" to deal with his mental health symptoms.  Client rated himself a 7/10 (10 greatest). Client denies thoughts of self harm.     DIMENSION 4:  Readiness to Change  Consider the amount of support and encouragement necessary to keep the client involved in treatment.     Readiness to Change - Current Risk Factor:  2    Goals: Understand the impact your substance use has had on you, your family and significant relationships. Increase internal motivation.   Goal: Connect with your spirituality.   - completed 12/12    Data:  Client reports his motivation for sobriety a 7 out 10 (10 greatest). Client reports he is motivated \"a better tomorrow and one day at a time\". Client has external motivation due to " current legal issues.      DIMENSION 5:  Relapse/Continued Use/Continued Problem Potential  Consider the degree to which the client recognizes relapse issues and has the skills to prevent relapse of either substance use or mental health problems.     Relapse/Continued Use/Continued Problem Potential - Current Risk Factor:  3    Goals: Develop sober coping and living skills. Identify personal triggers and relapse warning signs. Identify and understand personal relapse and self-sabotage patterns.    Data:  Client rated his cravings and triggers at a 7/10 (10 used). Client reports stress and anxiety with his mom going back to the hospital this week and having car problems. Client reports his mom's health is a big worry for him. Client reports talking about it and being at the hospital for his mom helps.     DIMENSION 6:  Recovery Environment  Consider the degree to which key areas of the client's life are supportive of or antagonistic to treatment participation and recovery.     Recovery Environment - Current Risk Factor:  3    Support group attended this week:  No    Did family agree to attend family week:  No    If yes:  none schedule this week    Goals:  Develop a sober support network. Develop boundaries.  Follow conditions of legal involvement.    Data:  Client reports his living situation is good and supportive. Client rates his situation a 1 out of 10 (10 stressful). Client reports his girlfriend and family are supportive of him. . Client rates his motivation to attend a sober support meeting a 1/10 (10 highest). Client is currently on parole. Client is currently not employed.         Intervention:  Therapy Group Check In, Behavioral Therapy, Cognitive Behavioral Therapy, Counselor Feedback, Education, Emotional management, Group Feedback, Motivational Enhancement Therapy, Relapse Prevention, Twelve Step Facilitation, Mental Health Education, Spiritual Care, Communication Skills    Client presented his Usage  History assignment. Client discussed being in a gang, which lead to him using meth. Client discussed how others used meth around him, and he began partaking. Client's only sobriety has been when he was in shelter (2 different times). Client discussed how is use has cut down, but still struggles. Client also shared his concerns about health, as his brother who was 3 years older passed away 3 years ago from heart issues, and his mom and dad both have heart problems. Client reports with meth use, he doesn't take care of his health as he should,      Assessment:  Stages of Change Model  Contemplation    Client was active and engaged during group. Client is more quiet and reserved, even in small groups.     Plan:    Seek medical attention if needed.  Present cravings and triggers.   Continue UAs through [place], attend [place] meetings.   Provide clean UAs, if not will need to start talking about client attending a higher level of care.   Find a sober support meeting to attend.     ZARI Long

## 2017-12-22 ENCOUNTER — COMMUNICATION - HEALTHEAST (OUTPATIENT)
Dept: FAMILY MEDICINE | Facility: CLINIC | Age: 37
End: 2017-12-22

## 2017-12-22 DIAGNOSIS — I10 ESSENTIAL HYPERTENSION: ICD-10-CM

## 2017-12-27 ENCOUNTER — HOSPITAL ENCOUNTER (OUTPATIENT)
Dept: BEHAVIORAL HEALTH | Facility: CLINIC | Age: 37
End: 2017-12-27
Attending: SOCIAL WORKER
Payer: COMMERCIAL

## 2017-12-27 PROCEDURE — H2035 A/D TX PROGRAM, PER HOUR: HCPCS | Mod: HQ

## 2017-12-28 NOTE — PROGRESS NOTES
CD ADULT Progress Note     Treatment Plan Review completed on:  12/28/2017    Attendance Dates: 12/25/2017 holiday, 12/26/2017 excused, 12/27/2017    Total # of Group Sessions(including orientation):  08 MONDAY TUESDAY WEDNESDAY THURSDAY FRIDAY SATURDAY SUNDAY Total   Group Therapy   x     2 hours   Specialty Groups*           1:1           Family Program           [place]             Phase II             Absent holiday x         Total x x x     2 hours     *Specialty Groups include Mental Health Care, Assertiveness and Communication, Sobriety Maintenance Skills, Spiritual Care, Stress Management, Relapse Prevention, Family Systems.                    Learning Style:  Hands on    Staff member contributing:  ZARI Long    Received supervision/consultation:  No    Client:  contributed to goals and plan    Did Client receive a copy of treatment plan/revised plan:  No    Changes to Treatment Plan:  No    Client agrees with plan/revised plan:  No    Any changes in Vulnerable Adult Status:  No    Substance Use Disorders:  Stimulant Related Disorder Severe (F15.20)  Amphetamine type substance      ASAM Risk Ratings and Data       DIMENSION 1: Acute Intoxication/Withdrawal  The client's ability to cope with withdrawal symptoms and current state of intoxication       Acute Intoxication/Withdrawal - Current Risk Factor:  0    Reporting sober date of New Date: 12/07/2017    Goals: Develop effective strategies to maintain sobriety.    Data: Client reports his last date of use of meth as 12/07/2017. Client denies experiencing PAW symptoms.     DIMENSION 2:  Biomedical Conditions and Complaints  The degree to which any physical disorder would interfere with treatment for substance abuse and the client's ability to tolerate any related discomfort     Biomedical Conditions and Complaints - Current Risk Factor:  0    Goals: Follow recommendations of medical provider.    Data:  Client reports having a runny nose and  "cough again.  Client is able to obtain medical services as needed.     DIMENSION 3:  Emotional/Behavioral/Cognitive Conditions and Complications  The degree to which any condition or complications are likely to interfere with treatment for substance abuse or with function in significant life areas and the likelihood of risk of harm to self or others.     Emotional/Behavioral - Current Risk Factor:  1    DSM-5 Diagnoses:   None     Suicide Assessment:  Risk Status    Ideation - Active thoughts of suicide Intent to follow through on suicide Plan for completing suicide    Yes No Yes No Yes No   Emergent  x  x  x   Urgent / Non-Emergent  x  x  x   Non- Urgent  x  x  x   No Current/Active Risk  x  x  x      Goals: Understand the relationship between addiction and mental health issues. Identify anger triggers and coping skills to deal with anger.     Data:  Client denies a mental health diagnosis. Client reports experiencing disturbed sleep 2/10, fatigue 4/10, difficulty concentrating 3/10, memory problems 1/10, mood swings 3/10, irritability 3/10, depression 4/10 and anxiety 3/10 (10 greatest). Client reports he \"has been walking and exercising more, also using meditation\" to deal with his mental health symptoms.  Client rated himself a 5/10 (10 greatest), due to feeling sick. Client also expressed concern since his mom is still in the hospital. Client denies thoughts of self harm.     DIMENSION 4:  Readiness to Change  Consider the amount of support and encouragement necessary to keep the client involved in treatment.     Readiness to Change - Current Risk Factor:  2    Goals: Understand the impact your substance use has had on you, your family and significant relationships. Increase internal motivation.   Goal: Connect with your spirituality.   - completed 12/12    Data:  Client reports his motivation for sobriety a 7 out 10 (10 greatest). Client reports he is motivated \"appreciation and taking care of myself\" as what " he is focusing on moving forward.  Client has external motivation due to current legal issues. Client appears in the contemplation stage of change.      DIMENSION 5:  Relapse/Continued Use/Continued Problem Potential  Consider the degree to which the client recognizes relapse issues and has the skills to prevent relapse of either substance use or mental health problems.     Relapse/Continued Use/Continued Problem Potential - Current Risk Factor:  3    Goals: Develop sober coping and living skills. Identify personal triggers and relapse warning signs. Identify and understand personal relapse and self-sabotage patterns.    Data:  Client rated his cravings and triggers at a 4/10 (10 used). Client reports stress and anxiety with his mom still in the hospital. Client reports his family takes turns being at the hospital for her.     DIMENSION 6:  Recovery Environment  Consider the degree to which key areas of the client's life are supportive of or antagonistic to treatment participation and recovery.     Recovery Environment - Current Risk Factor:  3    Support group attended this week:  No    Did family agree to attend family week:  No    If yes:  none schedule this week    Goals:  Develop a sober support network. Develop boundaries.  Follow conditions of legal involvement.    Data:  Client reports his living situation is good and supportive. Client rates his situation a 1 out of 10 (10 stressful). Client reports his girlfriend and family are supportive of him. Client rates his motivation to attend a sober support meeting a 1/10 (10 highest). Client is currently on parole. Client is currently not employed. No changes.          Intervention:  Therapy Group Check In, Behavioral Therapy, Cognitive Behavioral Therapy, Counselor Feedback, Education, Emotional management, Group Feedback, Motivational Enhancement Therapy, Relapse Prevention, Twelve Step Facilitation, Mental Health Education, Spiritual Care, Communication  Skills    Client shared his goals for 2018. Client reported how he could achieve his goals. Client also shared his goodbye letter to meth.     Assessment:  Stages of Change Model  Contemplation    Client is quiet during group, but appears he is paying attention to the discussion.     Plan:    Seek medical attention if needed.  Present cravings and triggers.   Continue UAs through [place], attend [place] meetings.   Provide clean UAs, if not will need to start talking about client attending a higher level of care.   Find a sober support meeting to attend.   Use sober supports over holiday weekend.     ZARI Long

## 2018-01-01 ENCOUNTER — COMMUNICATION - HEALTHEAST (OUTPATIENT)
Dept: FAMILY MEDICINE | Facility: CLINIC | Age: 38
End: 2018-01-01

## 2018-01-02 ENCOUNTER — OFFICE VISIT - HEALTHEAST (OUTPATIENT)
Dept: FAMILY MEDICINE | Facility: CLINIC | Age: 38
End: 2018-01-02

## 2018-01-02 DIAGNOSIS — F17.200 SMOKER: ICD-10-CM

## 2018-01-02 DIAGNOSIS — J40 BRONCHITIS: ICD-10-CM

## 2018-01-02 DIAGNOSIS — I10 ESSENTIAL HYPERTENSION: ICD-10-CM

## 2018-01-02 ASSESSMENT — MIFFLIN-ST. JEOR: SCORE: 1647.01

## 2018-01-08 ENCOUNTER — HOSPITAL ENCOUNTER (OUTPATIENT)
Dept: BEHAVIORAL HEALTH | Facility: CLINIC | Age: 38
End: 2018-01-08
Attending: SOCIAL WORKER
Payer: COMMERCIAL

## 2018-01-08 PROCEDURE — H2035 A/D TX PROGRAM, PER HOUR: HCPCS | Mod: HQ

## 2018-01-09 ENCOUNTER — HOSPITAL ENCOUNTER (OUTPATIENT)
Dept: BEHAVIORAL HEALTH | Facility: CLINIC | Age: 38
End: 2018-01-09
Attending: SOCIAL WORKER
Payer: COMMERCIAL

## 2018-01-09 PROCEDURE — H2035 A/D TX PROGRAM, PER HOUR: HCPCS | Mod: HQ

## 2018-01-12 NOTE — PROGRESS NOTES
CHEMICAL DEPENDENCY DISCHARGE SUMMARY      EVALUATION COUNSELOR:  ZARI Sotelo.   TREATMENT COUNSELOR:  ZARI Long.     REFERRAL SOURCE:  Probation.     PROGRAM:  [facility] Chemical Dependency Program in [place].     ADMISSION DATE:  11/29/2017.   LAST SESSION DATE:  01/09/2018.   DISCHARGE DATE:  01/11/2018.     ADMISSION IMPRESSION:  Amphetamine use disorder, severe, 304.40/F15.20.     DISCHARGE IMPRESSION:  Amphetamine use disorder, severe, 304.40/F15.20.     REASON FOR DISCHARGE:  Client is being discharged due to his continued use.  Client has continued to use throughout outpatient treatment.  He is being recommended to a higher level of care at this time.     LAST USE DATE:  On 01/09/2018, client reported his last date of use of meth as 01/01/2018.     HOURS OF TREATMENT COMPLETED:  20 hours.      REASON FOR EVALUATION:  Palomo Patterson had a chemical dependency evaluation with ZARI Sotelo on 11/15/2017.  Client reported he was providing dirty UAs for probation and they were requiring an evaluation.      SERVICES PROVIDED:  Included treatment planning, psychoeducation, recovery skill building, relapse prevention skills, problem solving, managing conflicts, clarifying values, expressing feelings, emotional management, social skill building, 12-step facilitation, feeling and concerned person group and group therapy.      ISSUES ADDRESSED IN TREATMENT:   DIMENSION 1:  Acute Withdrawal Issues and Detox:  Admit risk rating 0, discharge risk rating 1.  Client reported his last date of use of meth as 12/07/2017 and his UAs with probation weekly showed continued meth use.  Client was pressed about his last use date on 01/09/2018 and friendly reported that his last date of use of meth was 01/01/2018.  Client missed multiple sessions due to being ill and counselor had suspicion his illness symptoms could also be withdrawal symptoms, though he continued to deny  withdrawal issues.  No other concerns in this dimension at time of discharge.      DIMENSION 2:  Biomedical Conditions and/or Complications:  Admit risk rating 0, discharge risk rating 0.  Client reports a medical diagnosis of high blood pressure and high cholesterol.  Client reports heart issues also run in his family.  Client has a primary care provider and is able to obtain medical services as needed.  No other concerns in this dimension at this time.      DIMENSION 3:  Emotional and Behavioral:  Admit risk rating 1.  Discharge risk rating 1.     Client denied a mental health diagnosis but reported feeling anxiety and worry. Client reported feeling anxiety and worry with his mom s health and him not being employed currently. Client s goal was to learn coping skills and express anxiety in a healthy way. Client did not complete any of his goals in this dimension. Client lacks coping skills for his anxiety and worry. Client also has a past history of being in a gang, which also impacts his mental health. Client reports feeling anger as well. Client s goal was to identify anger coping skills. Client did not complete any goals regarding his anger. Client did not complete any goals in this dimension. Client denied thoughts of self-harm throughout treatment. Client denied any past suicide attempts, or suicide behaviors. No other concerns in this dimension at time of discharge.   DIMENSION 4:  Readiness for Change:  Admit risk rating 2, discharge risk rating 3.  The client was in treatment due to parole requirements and providing positive UAs.  Client's UAs continued to remain positive through parole during his time in treatment and did not admit using to group counselor until pressed about it on 01/09/2018.  Client's internal motivation for sobriety in treatment appears low at this time.  Client appears in the precontemplation stage of change as evidenced by his verbal report and attendance in treatment.  No other  concerns in this dimension at time of discharge.      DIMENSION 5:  Relapse & Continued Problem Potential:  Admit risk rating 3, discharge risk rating 4.    Client lacks a daily routine that includes healthy and sober living skills. Client was to identify sober coping skills and identify his personal triggers and relapse warning signs. Client did not complete any goals in this dimension, including attending sober support group meetings. Client continued to use substances throughout treatment, elevating his risk rating in this dimension.   DIMENSION 6:  Recovery Environment:  Admit risk rating 3, discharge risk rating 3.    Client lacks a sober support network and sober leisure activities. Client was to develop a support network and a daily meaningful structure. Client remained unemployed throughout treatment, did not report completing any job applications. Client has current legal involvement, currently on parole. Client was to follow all conditions of parole while in treatment. Client did not complete a Recovery Care Plan as he did not complete Phase 1 of treatment. No other concerns in this dimension at time discharge.      PROGNOSIS:  Client has a guarded prognosis.  Client's prognosis could be upgraded if he follows through all continuation of care recommendations.      STRENGTHS:  Client was polite to staff and other clients.      LIVING ARRANGEMENTS AT DISCHARGE:  Client lives with his parents, but also sometimes stays at his girlfriend's house.      CONTINUATION OF CARE RECOMMENDATIONS:    Palomo is to abstain from all non-prescribed mood-altering substances.    Palomo is recommended to continue managing his mental and physical health with his healthcare providers as directed to do so.    Palomo is recommended to continue to remain law abiding.   Palomo is to follow all conditions of parole.    Palomo is recommended to obtain an updated Chemical Dependency assessment and is recommended a higher level of care and follow  all recommendations of assessment.         This information has been disclosed to you from records protected by Federal confidentiality rules (42 CFR part 2). The Federal rules prohibit you from making any further disclosure of this information unless further disclosure is expressly permitted by the written consent of the person to whom it pertains or as otherwise permitted by 42 CFR part 2. A general authorization for the release of medical or other information is NOT sufficient for this purpose. The Federal rules restrict any use of the information to criminally investigate or prosecute any alcohol or drug abuse patient.      YANG INGRAM, WON             D: 2018 14:04   T: 2018 03:45   MT: lg      Name:     JAVI CASTELLANO   MRN:      [id number]        Account:      [id number]   :      1980           Visit Date:   2018      Document: [id number]

## 2018-02-18 ENCOUNTER — OFFICE VISIT - HEALTHEAST (OUTPATIENT)
Dept: FAMILY MEDICINE | Facility: CLINIC | Age: 38
End: 2018-02-18

## 2018-02-18 ENCOUNTER — COMMUNICATION - HEALTHEAST (OUTPATIENT)
Dept: FAMILY MEDICINE | Facility: CLINIC | Age: 38
End: 2018-02-18

## 2018-02-18 DIAGNOSIS — J20.9 BRONCHITIS WITH BRONCHOSPASM: ICD-10-CM

## 2018-02-18 DIAGNOSIS — R05.3 PERSISTENT COUGH: ICD-10-CM

## 2018-02-27 ENCOUNTER — OFFICE VISIT - HEALTHEAST (OUTPATIENT)
Dept: FAMILY MEDICINE | Facility: CLINIC | Age: 38
End: 2018-02-27

## 2018-02-27 DIAGNOSIS — J40 BRONCHITIS: ICD-10-CM

## 2018-02-27 DIAGNOSIS — R00.0 TACHYCARDIA: ICD-10-CM

## 2018-02-27 DIAGNOSIS — R06.00 DYSPNEA: ICD-10-CM

## 2018-02-27 DIAGNOSIS — R93.1 DECREASED CARDIAC EJECTION FRACTION: ICD-10-CM

## 2018-02-27 DIAGNOSIS — I51.7 CARDIOMEGALY: ICD-10-CM

## 2018-02-27 DIAGNOSIS — R60.0 PERIPHERAL EDEMA: ICD-10-CM

## 2018-02-27 DIAGNOSIS — I10 ESSENTIAL HYPERTENSION: ICD-10-CM

## 2018-02-27 LAB
ANION GAP SERPL CALCULATED.3IONS-SCNC: 8 MMOL/L (ref 5–18)
ATRIAL RATE - MUSE: 112 BPM
BUN SERPL-MCNC: 18 MG/DL (ref 8–22)
CALCIUM SERPL-MCNC: 9.2 MG/DL (ref 8.5–10.5)
CHLORIDE BLD-SCNC: 104 MMOL/L (ref 98–107)
CO2 SERPL-SCNC: 27 MMOL/L (ref 22–31)
CREAT SERPL-MCNC: 1.32 MG/DL (ref 0.7–1.3)
DIASTOLIC BLOOD PRESSURE - MUSE: NORMAL MMHG
GFR SERPL CREATININE-BSD FRML MDRD: >60 ML/MIN/1.73M2
GLUCOSE BLD-MCNC: 125 MG/DL (ref 70–125)
INTERPRETATION ECG - MUSE: NORMAL
P AXIS - MUSE: 61 DEGREES
POTASSIUM BLD-SCNC: 4 MMOL/L (ref 3.5–5)
PR INTERVAL - MUSE: 132 MS
QRS DURATION - MUSE: 90 MS
QT - MUSE: 350 MS
QTC - MUSE: 477 MS
R AXIS - MUSE: -12 DEGREES
SODIUM SERPL-SCNC: 139 MMOL/L (ref 136–145)
SYSTOLIC BLOOD PRESSURE - MUSE: NORMAL MMHG
T AXIS - MUSE: 107 DEGREES
VENTRICULAR RATE- MUSE: 112 BPM

## 2018-02-28 LAB — BNP SERPL-MCNC: 3462 PG/ML (ref 0–35)

## 2018-03-06 ENCOUNTER — TRANSFERRED RECORDS (OUTPATIENT)
Dept: HEALTH INFORMATION MANAGEMENT | Facility: CLINIC | Age: 38
End: 2018-03-06

## 2018-03-06 ENCOUNTER — COMMUNICATION - HEALTHEAST (OUTPATIENT)
Dept: TELEHEALTH | Facility: CLINIC | Age: 38
End: 2018-03-06

## 2018-03-06 ENCOUNTER — HOSPITAL ENCOUNTER (OUTPATIENT)
Dept: CARDIOLOGY | Facility: HOSPITAL | Age: 38
Discharge: HOME | End: 2018-03-06
Attending: FAMILY MEDICINE

## 2018-03-06 DIAGNOSIS — I51.7 CARDIOMEGALY: ICD-10-CM

## 2018-03-06 DIAGNOSIS — R60.0 PERIPHERAL EDEMA: ICD-10-CM

## 2018-03-06 LAB
AORTIC ROOT: 3.1 CM
BSA FOR ECHO PROCEDURE: 1.9 M2
CV BLOOD PRESSURE: NORMAL MMHG
CV ECHO HEIGHT: 64 IN
CV ECHO WEIGHT: 176 LBS
DOP CALC LVOT AREA: 3.14 CM2
DOP CALC LVOT DIAMETER: 2 CM
DOP CALC LVOT STROKE VOLUME: 27.8 CM3
DOP CALCLVOT PEAK VEL VTI: 8.86 CM
EJECTION FRACTION: 24 % (ref 55–75)
FRACTIONAL SHORTENING: 8.2 % (ref 28–44)
INTERVENTRICULAR SEPTUM IN END DIASTOLE: 0.7 CM (ref 0.6–1)
IVS/PW RATIO: 0.8
LA AREA 1: 24.8 CM2
LA AREA 2: 21.5 CM2
LEFT ATRIUM LENGTH: 5.17 CM
LEFT ATRIUM SIZE: 5 CM
LEFT ATRIUM TO AORTIC ROOT RATIO: 1.61 NO UNITS
LEFT ATRIUM VOLUME INDEX: 46.1 ML/M2
LEFT ATRIUM VOLUME: 87.7 ML
LEFT VENTRICLE CARDIAC INDEX: 1.7 L/MIN/M2
LEFT VENTRICLE CARDIAC OUTPUT: 3.3 L/MIN
LEFT VENTRICLE DIASTOLIC VOLUME INDEX: 99.5 CM3/M2 (ref 34–74)
LEFT VENTRICLE DIASTOLIC VOLUME: 189 CM3 (ref 62–150)
LEFT VENTRICLE HEART RATE: 117 BPM
LEFT VENTRICLE MASS INDEX: 100.8 G/M2
LEFT VENTRICLE SYSTOLIC VOLUME INDEX: 75.8 CM3/M2 (ref 11–31)
LEFT VENTRICLE SYSTOLIC VOLUME: 144 CM3 (ref 21–61)
LEFT VENTRICULAR INTERNAL DIMENSION IN DIASTOLE: 6.1 CM (ref 4.2–5.8)
LEFT VENTRICULAR INTERNAL DIMENSION IN SYSTOLE: 5.6 CM (ref 2.5–4)
LEFT VENTRICULAR MASS: 191.6 G
LEFT VENTRICULAR OUTFLOW TRACT MEAN GRADIENT: 1 MMHG
LEFT VENTRICULAR OUTFLOW TRACT MEAN VELOCITY: 45.7 CM/S
LEFT VENTRICULAR POSTERIOR WALL IN END DIASTOLE: 0.9 CM (ref 0.6–1)
LV STROKE VOLUME INDEX: 14.6 ML/M2
NUC REST DIASTOLIC VOLUME INDEX: 2816 LBS
NUC REST SYSTOLIC VOLUME INDEX: 64 IN
PR MAX PG: 15 MMHG
PR PEAK VELOCITY: 191 CM/S

## 2018-03-06 ASSESSMENT — MIFFLIN-ST. JEOR: SCORE: 1624.33

## 2018-03-07 ENCOUNTER — OFFICE VISIT - HEALTHEAST (OUTPATIENT)
Dept: CARDIOLOGY | Facility: CLINIC | Age: 38
End: 2018-03-07

## 2018-03-07 ENCOUNTER — AMBULATORY - HEALTHEAST (OUTPATIENT)
Dept: CARDIOLOGY | Facility: CLINIC | Age: 38
End: 2018-03-07

## 2018-03-07 ENCOUNTER — SURGERY - HEALTHEAST (OUTPATIENT)
Dept: CARDIOLOGY | Facility: CLINIC | Age: 38
End: 2018-03-07

## 2018-03-07 DIAGNOSIS — I10 ESSENTIAL HYPERTENSION: ICD-10-CM

## 2018-03-07 DIAGNOSIS — I42.8 NONISCHEMIC CARDIOMYOPATHY (H): ICD-10-CM

## 2018-03-07 DIAGNOSIS — I50.21 CHF (CONGESTIVE HEART FAILURE), NYHA CLASS I, ACUTE, SYSTOLIC (H): ICD-10-CM

## 2018-03-07 LAB — TSH SERPL DL<=0.005 MIU/L-ACNC: 2.55 UIU/ML (ref 0.3–5)

## 2018-03-07 ASSESSMENT — MIFFLIN-ST. JEOR: SCORE: 1637.94

## 2018-03-08 ENCOUNTER — COMMUNICATION - HEALTHEAST (OUTPATIENT)
Dept: CARDIOLOGY | Facility: CLINIC | Age: 38
End: 2018-03-08

## 2018-03-08 ENCOUNTER — AMBULATORY - HEALTHEAST (OUTPATIENT)
Dept: LAB | Facility: CLINIC | Age: 38
End: 2018-03-08

## 2018-03-08 ENCOUNTER — COMMUNICATION - HEALTHEAST (OUTPATIENT)
Dept: FAMILY MEDICINE | Facility: CLINIC | Age: 38
End: 2018-03-08

## 2018-03-08 DIAGNOSIS — R25.2 LEG CRAMPS: ICD-10-CM

## 2018-03-08 LAB
ANION GAP SERPL CALCULATED.3IONS-SCNC: 10 MMOL/L (ref 5–18)
BUN SERPL-MCNC: 18 MG/DL (ref 8–22)
CALCIUM SERPL-MCNC: 9 MG/DL (ref 8.5–10.5)
CHLORIDE BLD-SCNC: 102 MMOL/L (ref 98–107)
CO2 SERPL-SCNC: 27 MMOL/L (ref 22–31)
CREAT SERPL-MCNC: 1.37 MG/DL (ref 0.7–1.3)
GFR SERPL CREATININE-BSD FRML MDRD: 59 ML/MIN/1.73M2
GLUCOSE BLD-MCNC: 159 MG/DL (ref 70–125)
MAGNESIUM SERPL-MCNC: 1.4 MG/DL (ref 1.8–2.6)
POTASSIUM BLD-SCNC: 3.9 MMOL/L (ref 3.5–5)
SODIUM SERPL-SCNC: 139 MMOL/L (ref 136–145)

## 2018-03-09 ENCOUNTER — COMMUNICATION - HEALTHEAST (OUTPATIENT)
Dept: CARDIOLOGY | Facility: CLINIC | Age: 38
End: 2018-03-09

## 2018-03-14 ENCOUNTER — COMMUNICATION - HEALTHEAST (OUTPATIENT)
Dept: CARDIOLOGY | Facility: CLINIC | Age: 38
End: 2018-03-14

## 2018-03-22 ENCOUNTER — TELEPHONE (OUTPATIENT)
Dept: CARDIOLOGY | Facility: CLINIC | Age: 38
End: 2018-03-22

## 2018-03-26 ENCOUNTER — TELEPHONE (OUTPATIENT)
Dept: CARDIOLOGY | Facility: CLINIC | Age: 38
End: 2018-03-26

## 2018-03-26 NOTE — TELEPHONE ENCOUNTER
Care Everywhere refreshed & cardiac records in.  Pt has systolic hf, last EF 25-30% pt cancelled his angiogram at [facility] & did not complete his total workup there.  Shannon - please schedule w/ any of the F providers, will also need images of echo sent/pushed over.

## 2018-03-26 NOTE — TELEPHONE ENCOUNTER
Release of information request sent to patient to obtain echocardiogram images from [facility].

## 2018-03-26 NOTE — TELEPHONE ENCOUNTER
[facility] Call Center    Phone Message    May a detailed message be left on voicemail: yes    Reason for Call: Other: New congestive heart failure referral.  Patient is wanting to transfer care from [facility].  Patient has been seen at [facility] for cardiology in the past.  Patient had echocardiogram at [facility] per patient's significant other.       Action Taken: Message routed to:  [facility] ([facility]): Suzette Kumar RN and Other: Will route chart to nurse for reveiw.  Will also obtain any outside records that are needed.

## 2018-03-27 NOTE — TELEPHONE ENCOUNTER
Health Call Center    Phone Message    May a detailed message be left on voicemail: yes    Reason for Call: Other: Left a voicemail for patient's significant other to call me back to schedule an appointment with an advanced heart failure specialist     Action Taken: Other: Left voicemail

## 2018-03-27 NOTE — TELEPHONE ENCOUNTER
Faxed LUZ to [facility] for images of most recent echocardiogram to be sent to clinic

## 2018-03-27 NOTE — TELEPHONE ENCOUNTER
Spoke with patient's girlfriend, Parish, and got patient scheduled to see Dr. Ethan Freitas on Thursday, April 5th at 2:00PM.  Patient's girlfriend provided with location information.  Will send appointment letter today.

## 2018-04-03 ENCOUNTER — COMMUNICATION - HEALTHEAST (OUTPATIENT)
Dept: CARDIOLOGY | Facility: CLINIC | Age: 38
End: 2018-04-03

## 2018-04-05 ENCOUNTER — OFFICE VISIT (OUTPATIENT)
Dept: CARDIOLOGY | Facility: CLINIC | Age: 38
End: 2018-04-05
Attending: INTERNAL MEDICINE
Payer: COMMERCIAL

## 2018-04-05 ENCOUNTER — PRE VISIT (OUTPATIENT)
Dept: CARDIOLOGY | Facility: CLINIC | Age: 38
End: 2018-04-05

## 2018-04-05 ENCOUNTER — APPOINTMENT (OUTPATIENT)
Dept: LAB | Facility: CLINIC | Age: 38
End: 2018-04-05
Payer: COMMERCIAL

## 2018-04-05 ENCOUNTER — RADIANT APPOINTMENT (OUTPATIENT)
Dept: GENERAL RADIOLOGY | Facility: CLINIC | Age: 38
End: 2018-04-05
Attending: INTERNAL MEDICINE
Payer: COMMERCIAL

## 2018-04-05 VITALS — BODY MASS INDEX: 30.69 KG/M2 | HEIGHT: 63 IN | WEIGHT: 173.2 LBS

## 2018-04-05 DIAGNOSIS — I50.43 ACUTE ON CHRONIC COMBINED SYSTOLIC AND DIASTOLIC CONGESTIVE HEART FAILURE (H): Primary | ICD-10-CM

## 2018-04-05 DIAGNOSIS — I50.9 CHF (CONGESTIVE HEART FAILURE) (H): ICD-10-CM

## 2018-04-05 DIAGNOSIS — I50.43 ACUTE ON CHRONIC COMBINED SYSTOLIC AND DIASTOLIC CONGESTIVE HEART FAILURE (H): ICD-10-CM

## 2018-04-05 LAB
ALBUMIN SERPL-MCNC: 3.1 G/DL (ref 3.4–5)
ALP SERPL-CCNC: 76 U/L (ref 40–150)
ALT SERPL W P-5'-P-CCNC: 58 U/L (ref 0–70)
AMPHETAMINES UR QL SCN: ABNORMAL
ANION GAP SERPL CALCULATED.3IONS-SCNC: 10 MMOL/L (ref 3–14)
AST SERPL W P-5'-P-CCNC: 57 U/L (ref 0–45)
BASOPHILS # BLD AUTO: 0 10E9/L (ref 0–0.2)
BASOPHILS NFR BLD AUTO: 0.3 %
BILIRUB SERPL-MCNC: 1.3 MG/DL (ref 0.2–1.3)
BUN SERPL-MCNC: 27 MG/DL (ref 7–30)
CALCIUM SERPL-MCNC: 8.8 MG/DL (ref 8.5–10.1)
CANNABINOIDS UR QL: NEGATIVE
CHLORIDE SERPL-SCNC: 107 MMOL/L (ref 94–109)
CO2 SERPL-SCNC: 23 MMOL/L (ref 20–32)
COCAINE UR QL: NEGATIVE
CREAT SERPL-MCNC: 1.51 MG/DL (ref 0.66–1.25)
DIFFERENTIAL METHOD BLD: ABNORMAL
EOSINOPHIL # BLD AUTO: 0.1 10E9/L (ref 0–0.7)
EOSINOPHIL NFR BLD AUTO: 0.7 %
ERYTHROCYTE [DISTWIDTH] IN BLOOD BY AUTOMATED COUNT: 14.6 % (ref 10–15)
GFR SERPL CREATININE-BSD FRML MDRD: 52 ML/MIN/1.7M2
GLUCOSE SERPL-MCNC: 127 MG/DL (ref 70–99)
HCT VFR BLD AUTO: 41.4 % (ref 40–53)
HGB BLD-MCNC: 13.7 G/DL (ref 13.3–17.7)
IMM GRANULOCYTES # BLD: 0 10E9/L (ref 0–0.4)
IMM GRANULOCYTES NFR BLD: 0.3 %
IRON SATN MFR SERPL: 12 % (ref 15–46)
IRON SERPL-MCNC: 45 UG/DL (ref 35–180)
LYMPHOCYTES # BLD AUTO: 0.9 10E9/L (ref 0.8–5.3)
LYMPHOCYTES NFR BLD AUTO: 7.6 %
MAGNESIUM SERPL-MCNC: 1.7 MG/DL (ref 1.6–2.3)
MCH RBC QN AUTO: 28 PG (ref 26.5–33)
MCHC RBC AUTO-ENTMCNC: 33.1 G/DL (ref 31.5–36.5)
MCV RBC AUTO: 85 FL (ref 78–100)
MONOCYTES # BLD AUTO: 1.7 10E9/L (ref 0–1.3)
MONOCYTES NFR BLD AUTO: 13.5 %
NEUTROPHILS # BLD AUTO: 9.6 10E9/L (ref 1.6–8.3)
NEUTROPHILS NFR BLD AUTO: 77.6 %
NRBC # BLD AUTO: 0 10*3/UL
NRBC BLD AUTO-RTO: 0 /100
NT-PROBNP SERPL-MCNC: 6810 PG/ML (ref 0–125)
OPIATES UR QL SCN: NEGATIVE
PCP UR QL SCN: NEGATIVE
PLATELET # BLD AUTO: 215 10E9/L (ref 150–450)
POTASSIUM SERPL-SCNC: 3.7 MMOL/L (ref 3.4–5.3)
PROT SERPL-MCNC: 6.5 G/DL (ref 6.8–8.8)
RBC # BLD AUTO: 4.89 10E12/L (ref 4.4–5.9)
SODIUM SERPL-SCNC: 140 MMOL/L (ref 133–144)
TIBC SERPL-MCNC: 388 UG/DL (ref 240–430)
TSH SERPL DL<=0.005 MIU/L-ACNC: 2.72 MU/L (ref 0.4–4)
WBC # BLD AUTO: 12.4 10E9/L (ref 4–11)

## 2018-04-05 PROCEDURE — 83550 IRON BINDING TEST: CPT | Performed by: INTERNAL MEDICINE

## 2018-04-05 PROCEDURE — 36415 COLL VENOUS BLD VENIPUNCTURE: CPT | Performed by: INTERNAL MEDICINE

## 2018-04-05 PROCEDURE — 84443 ASSAY THYROID STIM HORMONE: CPT | Performed by: INTERNAL MEDICINE

## 2018-04-05 PROCEDURE — 83540 ASSAY OF IRON: CPT | Performed by: INTERNAL MEDICINE

## 2018-04-05 PROCEDURE — 83735 ASSAY OF MAGNESIUM: CPT | Performed by: INTERNAL MEDICINE

## 2018-04-05 PROCEDURE — 80324 DRUG SCREEN AMPHETAMINES 1/2: CPT | Performed by: INTERNAL MEDICINE

## 2018-04-05 PROCEDURE — 85025 COMPLETE CBC W/AUTO DIFF WBC: CPT | Performed by: INTERNAL MEDICINE

## 2018-04-05 PROCEDURE — 80053 COMPREHEN METABOLIC PANEL: CPT | Performed by: INTERNAL MEDICINE

## 2018-04-05 PROCEDURE — 99204 OFFICE O/P NEW MOD 45 MIN: CPT | Mod: ZP | Performed by: INTERNAL MEDICINE

## 2018-04-05 PROCEDURE — G0463 HOSPITAL OUTPT CLINIC VISIT: HCPCS

## 2018-04-05 PROCEDURE — 80307 DRUG TEST PRSMV CHEM ANLYZR: CPT | Performed by: INTERNAL MEDICINE

## 2018-04-05 PROCEDURE — 83880 ASSAY OF NATRIURETIC PEPTIDE: CPT | Performed by: INTERNAL MEDICINE

## 2018-04-05 RX ORDER — BENZONATATE 100 MG/1
200 CAPSULE ORAL 3 TIMES DAILY PRN
Qty: 25 CAPSULE | Refills: 1 | Status: SHIPPED | OUTPATIENT
Start: 2018-04-05 | End: 2023-01-25

## 2018-04-05 RX ORDER — FUROSEMIDE 20 MG
TABLET ORAL
Status: ON HOLD | COMMUNITY
Start: 2018-03-07 | End: 2018-04-15

## 2018-04-05 RX ORDER — CARVEDILOL 3.12 MG/1
TABLET ORAL
Status: ON HOLD | COMMUNITY
Start: 2018-03-07 | End: 2018-04-15

## 2018-04-05 RX ORDER — POTASSIUM CHLORIDE 1500 MG/1
20 TABLET, EXTENDED RELEASE ORAL DAILY
Qty: 60 TABLET | Refills: 3 | Status: ON HOLD | OUTPATIENT
Start: 2018-04-05 | End: 2018-04-15

## 2018-04-05 ASSESSMENT — PAIN SCALES - GENERAL: PAINLEVEL: NO PAIN (0)

## 2018-04-05 NOTE — PROGRESS NOTES
Ethan Freitas M.D.  Cardiovascular Medicine    I personally saw and examined this patient, discussed care with housestaff and other consultants, reviewed current laboratories and imaging studies, and conveyed impression and diagnostic/therapeutic plan to patient.    Problem List  1. Biventricular congestive heart failure  2. History of methamphetamine usage   History of multiple + urine tests  3. History of smoking  4. Family history of ASHD  5. History of appendix rupture    [facility]    History      +      Objective    124/80 68 with PVCs    Meds  Current Outpatient Prescriptions   Medication     carvedilol (COREG) 3.125 MG tablet     furosemide (LASIX) 20 MG tablet     UNABLE TO FIND     LOSARTAN POTASSIUM PO     losartan-hydrochlorothiazide (HYZAAR) 50-12.5 MG per tablet     AMLODIPINE BESYLATE PO     No current facility-administered medications for this visit.        Labs      Imaging     Component Name Value Ref Range   BSA 1.9 m2   Hieght 64 in    Weight 2816 lbs    /80 mmHg     bpm    LV volume diastolic 189 62 - 150 cm3   LV volume systolic 144 21 - 61 cm3   IVSd 0.7 0.6 - 1.0 cm   LVIDd 6.1 4.2 - 5.8 cm   LVIDs 5.6 2.5 - 4.0 cm   LVOT diam 2 cm   LVOT mean gradient 1 mmHg    LVOT peak VTI 8.86 cm   LVOT mean tad 45.7 cm/s   LV PWd 0.9 0.6 - 1.0 cm   AO root 3.1 cm   LA size 5 cm   LA/AO root ratio 1.61 no units    WV peak tad 191 cm/s   WV peak gradient 15 mmHg    LA area 2 21.5 cm2   LA area 1 24.8 cm2   LA length 5.17 cm   IVS/PW ratio 0.8     LV FS 8.2 28 - 44 %   Echo LVEF calculated 24 55 - 75 %   LA volume 87.7 mL   LV mass 191.6 g   LVOT area 3.14 cm2   LVOT SV 27.8 cm3   LV systolic volume index 75.8 11 - 31 cm3/m2   LV diastolic volume index 99.5 34 - 74 cm3/m2   LA volume index 46.1 mL/m2   LV mass index 100.8 g/m2   LV SVi 14.6 ml/m2   LV CO 3.3 l/min   LV Ci 1.7 l/min/m2   Height 64.0 in    Weight 176 lbs    Result Narrative   1. The left ventricle is mildly  enlarged. Left ventricular systolic   performance is moderate to severely reduced. The ejection fraction is   estimated to be 25-30%.    2. There is severe global reduction in left ventricular systolic   performance with relative preservation of the posterior base  3. There is trace aortic insufficiency.   4. There is mild right ventricular enlargement with moderate depression   and right ventricular systolic performance.  5. There is mild to moderate biatrial enlargement.         Assessment/Plan                 José Antonio Murillo MD    [address] [place] [id number] [phone] (Fax)      Senthil Nuñez NP    [address] [place], [place] [id number] [phone] (Fax      Jose Harding  [phone]  [address] [place] [address]

## 2018-04-05 NOTE — LETTER
4/5/2018      RE: Palomo Patterson  [address]  [place] [address]       Dear Colleague,    Thank you for the opportunity to participate in the care of your patient, Palomo Patterson, at the [facility]. Please see a copy of my visit note below.    Ethan Freitas M.D.  Cardiovascular Medicine    I personally saw and examined this patient, discussed care with housestaff and other consultants, reviewed current laboratories and imaging studies, and conveyed impression and diagnostic/therapeutic plan to patient.    Problem List  1. Biventricular congestive heart failure  2. History of methamphetamine usage   History of multiple + urine tests  3. History of smoking  4. Family history of ASHD  5. History of appendix rupture    [facility]    History      +      Objective    124/80 68 with PVCs    Meds  Current Outpatient Prescriptions   Medication     carvedilol (COREG) 3.125 MG tablet     furosemide (LASIX) 20 MG tablet     UNABLE TO FIND     LOSARTAN POTASSIUM PO     losartan-hydrochlorothiazide (HYZAAR) 50-12.5 MG per tablet     AMLODIPINE BESYLATE PO     No current facility-administered medications for this visit.        Labs      Imaging     Component Name Value Ref Range   BSA 1.9 m2   Hieght 64 in    Weight 2816 lbs    /80 mmHg     bpm    LV volume diastolic 189 62 - 150 cm3   LV volume systolic 144 21 - 61 cm3   IVSd 0.7 0.6 - 1.0 cm   LVIDd 6.1 4.2 - 5.8 cm   LVIDs 5.6 2.5 - 4.0 cm   LVOT diam 2 cm   LVOT mean gradient 1 mmHg    LVOT peak VTI 8.86 cm   LVOT mean tad 45.7 cm/s   LV PWd 0.9 0.6 - 1.0 cm   AO root 3.1 cm   LA size 5 cm   LA/AO root ratio 1.61 no units    OH peak tad 191 cm/s   OH peak gradient 15 mmHg    LA area 2 21.5 cm2   LA area 1 24.8 cm2   LA length 5.17 cm   IVS/PW ratio 0.8     LV FS 8.2 28 - 44 %   Echo LVEF calculated 24 55 - 75 %   LA volume 87.7 mL   LV mass 191.6 g   LVOT area 3.14 cm2   LVOT SV 27.8 cm3    LV systolic volume index 75.8 11 - 31 cm3/m2   LV diastolic volume index 99.5 34 - 74 cm3/m2   LA volume index 46.1 mL/m2   LV mass index 100.8 g/m2   LV SVi 14.6 ml/m2   LV CO 3.3 l/min   LV Ci 1.7 l/min/m2   Height 64.0 in    Weight 176 lbs    Result Narrative   1. The left ventricle is mildly enlarged. Left ventricular systolic   performance is moderate to severely reduced. The ejection fraction is   estimated to be 25-30%.    2. There is severe global reduction in left ventricular systolic   performance with relative preservation of the posterior base  3. There is trace aortic insufficiency.   4. There is mild right ventricular enlargement with moderate depression   and right ventricular systolic performance.  5. There is mild to moderate biatrial enlargement.         Assessment/Plan                 José Antonio Murillo MD    [address] [place] [id number] [phone] (Fax)      Senthil Nuñez NP    [address] [place], [place] [id number] [phone] (Fax      Jose Harding  [phone]  [address] [place] [address]                            Service Date: 2018      José Antonio Murillo MD   [address] [address]   FAX:  [phone]      RE: Palomo Patterson   MRN: [id number]   : 1980       IMPRESSION:   1.  Biventricular acute on chronic congestive heart failure.   2.  History of methamphetamine use (recurrent).   A.  History of multiple positive urine tests.   3.  History of smoking.   4.  Family history of atherosclerotic cardiovascular disease.   5.  Family history of sudden death.   6.  History of appendiceal rupture.      Dear José Antonio:      I had the opportunity to see your patient Palomo Patterson for advanced heart failure therapy evaluation.  As you are aware, he is a 37-year-old [place]  male who presented with a 6 month history of cough and was subsequently found to have cardiomegaly and then  reduced ejection fraction.  He has a family history of sudden death as well as cardiomyopathy in his mother.  He has a history of cigarette smoking.  He also has a history of recurrent methamphetamine usage.  He has been using within the last week.  He has been through treatment on one occasion.      He has no hematuria.  No interim history of palpitations, presyncope or syncope.  He has some paroxysmal cough, particularly at night.  He does have dependent edema.  He has no history of thyroid dysfunction.  He does not use alcohol regularly.  He has no other history of toxic exposures.  He has no history of autoimmune disease.      SMOKING:  One pack per day.        RECREATIONAL DRUGS:  Methamphetamine.      FAMILY HISTORY:  See above.      CURRENT MEDICATIONS:   1.  Carvedilol 3.125 daily.   2.  Furosemide 20 daily.   3.  Losartan 50/12.5 daily.    4.  Amlodipine.      A 2 dimensional echocardiogram demonstrated the left ventricle to be mildly enlarged.  Left ventricular systolic performance was reduced to an ejection fraction of 25%-30%.  There was trace aortic insufficiency.  There was mild right ventricular enlargement with moderate depression and right ventricular systolic performance.  Biatrial enlargement was present.      PHYSICAL EXAMINATION:  Examination shows an alert, oriented [place]  male.  Blood pressure is 124/80, heart rate 78 and slightly irregular secondary to frequent prematures.  Jugular venous pressure is increased.  Hepatojugular reflex is positive.  Chest shows rhonchi that clear with cough.  There are no wheezes.  There is no evidence of left or right ventricular overactivity.  There is an apical 4th heart sound.  No murmurs are noted.  The abdomen shows an old surgical incision.  There is no obvious ascites.  There is 2+ peripheral edema.  He has normal gait, station and mentation.  He is multiply tattooed.        The patient is not a candidate at this time for advanced therapies in  view of his chemical dependency.  He would need to go through treatment as well as be clean, including random urine checking, for at least 6 months to a year following treatment.      For today, I increased his furosemide to 40 mg daily.  I added potassium 20 mEq each morning and increased his Coreg to 6.25 b.i.d.  He was to weigh himself every morning and record.  He was to call if his weight was up more than 2-3 pounds.  He was to call 911 should he have syncopal episodes.  I gave him Tessalon Perles one 3 times daily for cough, which I am not totally convinced is related to his heart failure as opposed to his smoking.      Thank you very much for allowing us to see him.      Sincerely yours,      MD ETHAN Sifuentes MD             D: 2018   T: 2018   MT: destiny      Name:     JAVI CASTELLANO   MRN:      [id number]        Account:      [id number]   :      1980           Service Date: 2018      Document: [id number]         Please do not hesitate to contact me if you have any questions/concerns.     Sincerely,     Ethan Freitas MD

## 2018-04-05 NOTE — NURSING NOTE
POC discussed w/ pt & s.o & family members including medications & instructions for testing, verbalizes understanding to plan.

## 2018-04-05 NOTE — MR AVS SNAPSHOT
After Visit Summary   4/5/2018    Javi Patterson    MRN: [id number]           Patient Information     Date Of Birth          1980        Visit Information        Provider Department      4/5/2018 2:00 PM Ethan Freitas MD [facility]        Today's Diagnoses     Acute on chronic combined systolic and diastolic congestive heart failure (H)    -  1    CHF (congestive heart failure) (H)          Care Instructions    You were seen at the [facility] clinic today.  You saw Dr. Freitas  Here are your Instructions:        Suzette Kumar RN  Nurse Care Coordinator  Office:  [phone] option #1, then #3 & ask for Suzette (nurse line)  Fax:  [phone]  After Hours:  [phone]  Appointments:  [phone] option #1, then option #1  After hours [phone] ask for Cards 2/heart failure fellow     Results for JAVI PATTERSON (MRN [id number]) as of 4/5/2018 16:23   Ref. Range 4/5/2018 15:31   Magnesium Latest Ref Range: 1.6 - 2.3 mg/dL 1.7   Iron Latest Ref Range: 35 - 180 ug/dL 45   Iron Binding Cap Latest Ref Range: 240 - 430 ug/dL 388   Iron Saturation Index Latest Ref Range: 15 - 46 % 12 (L)   N-Terminal Pro Bnp Latest Ref Range: 0 - 125 pg/mL 6810 (H)   TSH Latest Ref Range: 0.40 - 4.00 mU/L 2.72   WBC Latest Ref Range: 4.0 - 11.0 10e9/L 12.4 (H)   Hemoglobin Latest Ref Range: 13.3 - 17.7 g/dL 13.7   Hematocrit Latest Ref Range: 40.0 - 53.0 % 41.4   Platelet Count Latest Ref Range: 150 - 450 10e9/L 215   RBC Count Latest Ref Range: 4.4 - 5.9 10e12/L 4.89   MCV Latest Ref Range: 78 - 100 fl 85   MCH Latest Ref Range: 26.5 - 33.0 pg 28.0   MCHC Latest Ref Range: 31.5 - 36.5 g/dL 33.1   RDW Latest Ref Range: 10.0 - 15.0 % 14.6   Diff Method Unknown Automated Method   % Neutrophils Latest Units: % 77.6   % Lymphocytes Latest Units: % 7.6   % Monocytes Latest Units: % 13.5   % Eosinophils Latest Units: % 0.7   % Basophils Latest Units: % 0.3   %  Immature Granulocytes Latest Units: % 0.3   Nucleated RBCs Latest Ref Range: 0 /100 0   Absolute Neutrophil Latest Ref Range: 1.6 - 8.3 10e9/L 9.6 (H)   Absolute Lymphocytes Latest Ref Range: 0.8 - 5.3 10e9/L 0.9   Absolute Monocytes Latest Ref Range: 0.0 - 1.3 10e9/L 1.7 (H)   Absolute Eosinophils Latest Ref Range: 0.0 - 0.7 10e9/L 0.1   Absolute Basophils Latest Ref Range: 0.0 - 0.2 10e9/L 0.0   Abs Immature Granulocytes Latest Ref Range: 0 - 0.4 10e9/L 0.0   Absolute Nucleated RBC Unknown 0.0     1. Increase your furosemide to 40mgs every morning  2. Add potassium 20meq every mornring  3. Monday or Tuesday next week will have heart catherization, biopsy, angiogram and be seen in clinic.  There is a small chance that you will be admitted to hospital  4. Increase the Coreg to two 3.125 pills (total 6.25mgs) each day  5. Weigh yourself every morning and record.  If your weight goes up by more that 2-3 pounds you must call us.  6. If you pass out, you must call 911  7. You must not use!  8. Tessalon pearls one three times daily for cough    When you get home today, take two of the furosemide and two potassium tablets.                Follow-ups after your visit        Future tests that were ordered for you today     Open Standing Orders        Priority Remaining Interval Expires Ordered    Comprehensive metabolic panel Routine 1/1 AM DRAW  4/5/2018            Who to contact     If you have questions or need follow up information about today's clinic visit or your schedule please contact [facility] directly at [phone].  Normal or non-critical lab and imaging results will be communicated to you by MyChart, letter or phone within 4 business days after the clinic has received the results. If you do not hear from us within 7 days, please contact the clinic through MyChart or phone. If you have a critical or abnormal lab result, we will notify you by phone as soon as possible.  Submit refill requests through  "[employer] or call your pharmacy and they will forward the refill request to us. Please allow 3 business days for your refill to be completed.          Additional Information About Your Visit        MyChart Information     [employer] [employer] lets you send messages to your doctor, view your test results, renew your prescriptions, schedule appointments and more. To sign up, go to [email or web address] . Click on \"Log in\" on the left side of the screen, which will take you to the Welcome page. Then click on \"Sign up Now\" on the right side of the page.     You will be asked to enter the access code listed below, as well as some personal information. Please follow the directions to create your username and password.     Your access code is: [email or web address]  Expires: 2018  1:48 PM     Your access code will  in 90 days. If you need help or a new code, please call your [place] clinic or [phone].        Care EveryWhere ID     This is your Care EveryWhere ID. This could be used by other organizations to access your [place] medical records  [id number]        Your Vitals Were     Height BMI (Body Mass Index)                1.6 m (5' 3\") 30.68 kg/m2           Blood Pressure from Last 3 Encounters:   No data found for BP    Weight from Last 3 Encounters:   18 78.6 kg (173 lb 3.2 oz)              We Performed the Following     Amphetamine confirm urine     CBC with platelets differential     Comprehensive metabolic panel     Drug abuse scrn 7 UR (/) (RH, SH, UR)     Drug screen comprehensive blood ([facility])     Iron and iron binding capacity     Magnesium     N terminal pro BNP outpatient     TSH with free T4 reflex     XR Chest 2 Views          Today's Medication Changes          These changes are accurate as of 18  5:43 PM.  If you have any questions, ask your nurse or doctor.               Start taking these medicines.        Dose/Directions    benzonatate 100 MG capsule   Commonly known as:  " TESSALON   Used for:  Acute on chronic combined systolic and diastolic congestive heart failure (H)   Started by:  Ethan Freitas MD        Dose:  200 mg   Take 2 capsules (200 mg) by mouth 3 times daily as needed for cough   Quantity:  25 capsule   Refills:  1       potassium chloride SA 20 MEQ CR tablet   Commonly known as:  K-DUR/KLOR-CON M   Used for:  Acute on chronic combined systolic and diastolic congestive heart failure (H)   Started by:  Ethan Freitas MD        Dose:  20 mEq   Take 1 tablet (20 mEq) by mouth daily   Quantity:  60 tablet   Refills:  3            Where to get your medicines      These medications were sent to [id number] - [facility] [address] [place] [address] [facility] [address]     Phone:  [phone]     benzonatate 100 MG capsule    potassium chloride SA 20 MEQ CR tablet                Primary Care Provider    None Specified       No primary provider on file.        Equal Access to Services     [facility]: Hadii adriel King, waaxda lurikki, qaybta kaalmada wilfred, [place] . So [facility] [phone].    ATENCIÓN: Si habla español, tiene a villavicencio disposición servicios gratuitos de asistencia lingüística. Llame al [phone].    We comply with applicable federal civil rights laws and [place] laws. We do not discriminate on the basis of race, color, national origin, age, disability, sex, sexual orientation, or gender identity.            Thank you!     Thank you for choosing [facility]  for your care. Our goal is always to provide you with excellent care. Hearing back from our patients is one way we can continue to improve our services. Please take a few minutes to complete the written survey that you may receive in the mail after your visit with us. Thank you!             Your Updated Medication List - Protect others around you: Learn how to safely use, store and throw away your  medicines at www.disposemymeds.org.          This list is accurate as of 4/5/18  5:43 PM.  Always use your most recent med list.                   Brand Name Dispense Instructions for use Diagnosis    AMLODIPINE BESYLATE PO      Take 10 mg by mouth daily        benzonatate 100 MG capsule    TESSALON    25 capsule    Take 2 capsules (200 mg) by mouth 3 times daily as needed for cough    Acute on chronic combined systolic and diastolic congestive heart failure (H)       carvedilol 3.125 MG tablet    COREG     Take 1 tablet (3.125 mg total) by mouth 2 (two) times a day with meals.        furosemide 20 MG tablet    LASIX     Take 1 tablet (20 mg total) by mouth 2 (two) times a day at 9am and 6pm.        LOSARTAN POTASSIUM PO      Take 50 mg by mouth daily        losartan-hydrochlorothiazide 50-12.5 MG per tablet    HYZAAR     Take 1 tablet by mouth daily        potassium chloride SA 20 MEQ CR tablet    K-DUR/KLOR-CON M    60 tablet    Take 1 tablet (20 mEq) by mouth daily    Acute on chronic combined systolic and diastolic congestive heart failure (H)       UNABLE TO FIND      MEDICATION NAME: metoprolol

## 2018-04-05 NOTE — LETTER
Date:April 6, 2018      Patient was self referred, no letter generated. Do not send.        [facility]

## 2018-04-05 NOTE — PATIENT INSTRUCTIONS
You were seen at the [facility] clinic today.  You saw Dr. Freitas  Here are your Instructions:        Suzette Kumar RN  Nurse Care Coordinator  Office:  [phone] option #1, then #3 & ask for Suzette (nurse line)  Fax:  [phone]  After Hours:  [phone]  Appointments:  [phone] option #1, then option #1  After hours [phone] ask for Cards 2/heart failure fellow     Results for JAVI CASTELLANO (MRN [id number]) as of 4/5/2018 16:23   Ref. Range 4/5/2018 15:31   Magnesium Latest Ref Range: 1.6 - 2.3 mg/dL 1.7   Iron Latest Ref Range: 35 - 180 ug/dL 45   Iron Binding Cap Latest Ref Range: 240 - 430 ug/dL 388   Iron Saturation Index Latest Ref Range: 15 - 46 % 12 (L)   N-Terminal Pro Bnp Latest Ref Range: 0 - 125 pg/mL 6810 (H)   TSH Latest Ref Range: 0.40 - 4.00 mU/L 2.72   WBC Latest Ref Range: 4.0 - 11.0 10e9/L 12.4 (H)   Hemoglobin Latest Ref Range: 13.3 - 17.7 g/dL 13.7   Hematocrit Latest Ref Range: 40.0 - 53.0 % 41.4   Platelet Count Latest Ref Range: 150 - 450 10e9/L 215   RBC Count Latest Ref Range: 4.4 - 5.9 10e12/L 4.89   MCV Latest Ref Range: 78 - 100 fl 85   MCH Latest Ref Range: 26.5 - 33.0 pg 28.0   MCHC Latest Ref Range: 31.5 - 36.5 g/dL 33.1   RDW Latest Ref Range: 10.0 - 15.0 % 14.6   Diff Method Unknown Automated Method   % Neutrophils Latest Units: % 77.6   % Lymphocytes Latest Units: % 7.6   % Monocytes Latest Units: % 13.5   % Eosinophils Latest Units: % 0.7   % Basophils Latest Units: % 0.3   % Immature Granulocytes Latest Units: % 0.3   Nucleated RBCs Latest Ref Range: 0 /100 0   Absolute Neutrophil Latest Ref Range: 1.6 - 8.3 10e9/L 9.6 (H)   Absolute Lymphocytes Latest Ref Range: 0.8 - 5.3 10e9/L 0.9   Absolute Monocytes Latest Ref Range: 0.0 - 1.3 10e9/L 1.7 (H)   Absolute Eosinophils Latest Ref Range: 0.0 - 0.7 10e9/L 0.1   Absolute Basophils Latest Ref Range: 0.0 - 0.2 10e9/L 0.0   Abs Immature Granulocytes Latest Ref Range: 0 - 0.4  10e9/L 0.0   Absolute Nucleated RBC Unknown 0.0     1. Increase your furosemide to 40mgs every morning  2. Add potassium 20meq every mornring  3. Monday or Tuesday next week will have heart catherization, biopsy, angiogram and be seen in clinic.  There is a small chance that you will be admitted to hospital  4. Increase the Coreg to two 3.125 pills (total 6.25mgs) each day  5. Weigh yourself every morning and record.  If your weight goes up by more that 2-3 pounds you must call us.  6. If you pass out, you must call 911  7. You must not use!  8. Tessalon pearls one three times daily for cough    When you get home today, take two of the furosemide and two potassium tablets.

## 2018-04-06 LAB
ACETAMINOPHEN QUAL: NEGATIVE
AMOBARBITAL QUAL: NEGATIVE
BARBITAL QUAL: NEGATIVE
BUTABARBITAL QUAL: NEGATIVE
BUTALBITAL QUAL: NEGATIVE
CAFFEINE QUAL: POSITIVE
CARBAMAZEPINE QUAL: NEGATIVE
CARISOPRODOL QUAL: NEGATIVE
CHLORPROPAMIDE UR-MCNC: NEGATIVE UG/ML
DRUGS SERPL SCN: NEGATIVE
ETHCLORVYNOL QUAL: NEGATIVE
ETHINAMATE QUAL: NEGATIVE
ETHOSUXIMIDE QUAL: NEGATIVE
ETHOTOIN QUAL: NEGATIVE
GLUTETHIMIDE QUAL: NEGATIVE
IBUPROFEN QUAL: NEGATIVE
MEPHENYTOIN QUAL: NEGATIVE
MEPHOBARBITAL QUAL: NEGATIVE
MEPROBAMATE QUAL: NEGATIVE
METHAQUALONE QUAL: NEGATIVE
METHARBITAL QUAL: NEGATIVE
METHSUXIMIDE QUAL: NEGATIVE
METHYPRYLON QUAL: NEGATIVE
PENTOBARBITAL QUAL: NEGATIVE
PHENACETIN QUAL: NEGATIVE
PHENOBARBITAL QUAL: NEGATIVE
PHENSUXIMIDE QUAL: NEGATIVE
PHENYTOIN QUAL: NEGATIVE
PRIMIDONE QUAL: NEGATIVE
SALICYLATE QUAL: NEGATIVE
SECOBARBITAL QUAL: NEGATIVE
TALBUTAL QUAL: NEGATIVE
THEOPHYLLINE QUAL: NEGATIVE
THIOPENTAL QUAL: NEGATIVE
TYBAMATE QUAL: NEGATIVE
VALPROIC ACID QUAL: NEGATIVE

## 2018-04-06 RX ORDER — LIDOCAINE 40 MG/G
CREAM TOPICAL
Status: CANCELLED | OUTPATIENT
Start: 2018-04-06

## 2018-04-06 RX ORDER — SODIUM CHLORIDE 9 MG/ML
INJECTION, SOLUTION INTRAVENOUS CONTINUOUS
Status: CANCELLED | OUTPATIENT
Start: 2018-04-06

## 2018-04-06 NOTE — PROGRESS NOTES
Service Date: 2018      José [place] [address]   FAX:  [phone]      RE: Palomo Patterson   MRN: [id number]   : 1980       IMPRESSION:   1.  Biventricular acute on chronic congestive heart failure.   2.  History of methamphetamine use (recurrent).   A.  History of multiple positive urine tests.   3.  History of smoking.   4.  Family history of atherosclerotic cardiovascular disease.   5.  Family history of sudden death.   6.  History of appendiceal rupture.      Dear José Antonio:      I had the opportunity to see your patient Palomo Patterson for advanced heart failure therapy evaluation.  As you are aware, he is a 37-year-old [place]  male who presented with a 6 month history of cough and was subsequently found to have cardiomegaly and then reduced ejection fraction.  He has a family history of sudden death as well as cardiomyopathy in his mother.  He has a history of cigarette smoking.  He also has a history of recurrent methamphetamine usage.  He has been using within the last week.  He has been through treatment on one occasion.      He has no hematuria.  No interim history of palpitations, presyncope or syncope.  He has some paroxysmal cough, particularly at night.  He does have dependent edema.  He has no history of thyroid dysfunction.  He does not use alcohol regularly.  He has no other history of toxic exposures.  He has no history of autoimmune disease.      SMOKING:  One pack per day.        RECREATIONAL DRUGS:  Methamphetamine.      FAMILY HISTORY:  See above.      CURRENT MEDICATIONS:   1.  Carvedilol 3.125 daily.   2.  Furosemide 20 daily.   3.  Losartan 50/12.5 daily.    4.  Amlodipine.      A 2 dimensional echocardiogram demonstrated the left ventricle to be mildly enlarged.  Left ventricular systolic performance was reduced to an ejection fraction of 25%-30%.  There was trace aortic insufficiency.  There was mild right ventricular  enlargement with moderate depression and right ventricular systolic performance.  Biatrial enlargement was present.      PHYSICAL EXAMINATION:  Examination shows an alert, oriented [place]  male.  Blood pressure is 124/80, heart rate 78 and slightly irregular secondary to frequent prematures.  Jugular venous pressure is increased.  Hepatojugular reflex is positive.  Chest shows rhonchi that clear with cough.  There are no wheezes.  There is no evidence of left or right ventricular overactivity.  There is an apical 4th heart sound.  No murmurs are noted.  The abdomen shows an old surgical incision.  There is no obvious ascites.  There is 2+ peripheral edema.  He has normal gait, station and mentation.  He is multiply tattooed.        The patient is not a candidate at this time for advanced therapies in view of his chemical dependency.  He would need to go through treatment as well as be clean, including random urine checking, for at least 6 months to a year following treatment.      For today, I increased his furosemide to 40 mg daily.  I added potassium 20 mEq each morning and increased his Coreg to 6.25 b.i.d.  He was to weigh himself every morning and record.  He was to call if his weight was up more than 2-3 pounds.  He was to call 911 should he have syncopal episodes.  I gave him Tessalon Perles one 3 times daily for cough, which I am not totally convinced is related to his heart failure as opposed to his smoking.      Thank you very much for allowing us to see him.      Sincerely yours,      MD DUSTIN Sifuentes MD             D: 2018   T: 2018   MT: destiny      Name:     JAVI CASTELLANO   MRN:      [id number]        Account:      [id number]   :      1980           Service Date: 2018      Document: [id number]

## 2018-04-06 NOTE — NURSING NOTE
Angio & RHC w/ biopsy scheduled for Monday 4/9/208 at 1200 check-in time for labs.  Pt may be admitted to the hospital, but if not needs appt w/ NP after.  Given pt is later case, appt will be made for Tuesday w/ Christine B if he is not admitted Monday. LM for pt & s.o to call me to discuss.

## 2018-04-09 ENCOUNTER — HOSPITAL ENCOUNTER (INPATIENT)
Facility: CLINIC | Age: 38
LOS: 6 days | Discharge: HOME | DRG: 286 | End: 2018-04-15
Attending: INTERNAL MEDICINE | Admitting: INTERNAL MEDICINE
Payer: COMMERCIAL

## 2018-04-09 ENCOUNTER — APPOINTMENT (OUTPATIENT)
Dept: MEDSURG UNIT | Facility: CLINIC | Age: 38
DRG: 286 | End: 2018-04-09
Attending: INTERNAL MEDICINE
Payer: COMMERCIAL

## 2018-04-09 ENCOUNTER — APPOINTMENT (OUTPATIENT)
Dept: GENERAL RADIOLOGY | Facility: CLINIC | Age: 38
DRG: 286 | End: 2018-04-09
Attending: STUDENT IN AN ORGANIZED HEALTH CARE EDUCATION/TRAINING PROGRAM
Payer: COMMERCIAL

## 2018-04-09 ENCOUNTER — APPOINTMENT (OUTPATIENT)
Dept: CARDIOLOGY | Facility: CLINIC | Age: 38
DRG: 286 | End: 2018-04-09
Attending: INTERNAL MEDICINE
Payer: COMMERCIAL

## 2018-04-09 DIAGNOSIS — R05.3 CHRONIC COUGH: ICD-10-CM

## 2018-04-09 DIAGNOSIS — I50.43 ACUTE ON CHRONIC COMBINED SYSTOLIC AND DIASTOLIC CONGESTIVE HEART FAILURE (H): ICD-10-CM

## 2018-04-09 DIAGNOSIS — I50.43 ACUTE ON CHRONIC COMBINED SYSTOLIC AND DIASTOLIC HEART FAILURE (H): ICD-10-CM

## 2018-04-09 DIAGNOSIS — I50.82 BIVENTRICULAR HEART FAILURE (H): ICD-10-CM

## 2018-04-09 DIAGNOSIS — I10 BENIGN ESSENTIAL HYPERTENSION: ICD-10-CM

## 2018-04-09 DIAGNOSIS — F19.10 SUBSTANCE ABUSE (H): Primary | ICD-10-CM

## 2018-04-09 PROBLEM — I50.9 HEART FAILURE (H): Status: ACTIVE | Noted: 2018-04-09

## 2018-04-09 LAB
AMPHETAMINES UR CFM-MCNC: NORMAL NG/ML
ANION GAP SERPL CALCULATED.3IONS-SCNC: 7 MMOL/L (ref 3–14)
BUN SERPL-MCNC: 19 MG/DL (ref 7–30)
CALCIUM SERPL-MCNC: 8.5 MG/DL (ref 8.5–10.1)
CHLORIDE SERPL-SCNC: 105 MMOL/L (ref 94–109)
CO2 SERPL-SCNC: 26 MMOL/L (ref 20–32)
CREAT SERPL-MCNC: 1.53 MG/DL (ref 0.66–1.25)
D-METHAMPHET MFR UR: >91 %
GFR SERPL CREATININE-BSD FRML MDRD: 52 ML/MIN/1.7M2
GLUCOSE BLDC GLUCOMTR-MCNC: 93 MG/DL (ref 70–99)
GLUCOSE SERPL-MCNC: 89 MG/DL (ref 70–99)
KCT BLD-ACNC: 111 SEC (ref 75–150)
L-METHAMPHET MFR UR: <9 %
METHAMPHET UR CFM-MCNC: NORMAL NG/ML
MRSA DNA SPEC QL NAA+PROBE: POSITIVE
PLATELET # BLD AUTO: 217 10E9/L (ref 150–450)
POTASSIUM SERPL-SCNC: 4.1 MMOL/L (ref 3.4–5.3)
SODIUM SERPL-SCNC: 138 MMOL/L (ref 133–144)
SPECIMEN SOURCE: ABNORMAL

## 2018-04-09 PROCEDURE — 88313 SPECIAL STAINS GROUP 2: CPT | Performed by: INTERNAL MEDICINE

## 2018-04-09 PROCEDURE — 87641 MR-STAPH DNA AMP PROBE: CPT | Performed by: INTERNAL MEDICINE

## 2018-04-09 PROCEDURE — 4A023N6 MEASUREMENT OF CARDIAC SAMPLING AND PRESSURE, RIGHT HEART, PERCUTANEOUS APPROACH: ICD-10-PCS | Performed by: INTERNAL MEDICINE

## 2018-04-09 PROCEDURE — 93505 ENDOMYOCARDIAL BIOPSY: CPT | Mod: 26 | Performed by: INTERNAL MEDICINE

## 2018-04-09 PROCEDURE — 87640 STAPH A DNA AMP PROBE: CPT | Performed by: INTERNAL MEDICINE

## 2018-04-09 PROCEDURE — 93505 ENDOMYOCARDIAL BIOPSY: CPT

## 2018-04-09 PROCEDURE — 27210946 ZZH KIT HC TOTES DISP CR8

## 2018-04-09 PROCEDURE — 27211047 ZZH FORCEP BIOPSY CR10

## 2018-04-09 PROCEDURE — 40000172 ZZH STATISTIC PROCEDURE PREP ONLY

## 2018-04-09 PROCEDURE — 25000128 H RX IP 250 OP 636: Performed by: STUDENT IN AN ORGANIZED HEALTH CARE EDUCATION/TRAINING PROGRAM

## 2018-04-09 PROCEDURE — 4A1239Z MONITORING OF CARDIAC OUTPUT, PERCUTANEOUS APPROACH: ICD-10-PCS | Performed by: INTERNAL MEDICINE

## 2018-04-09 PROCEDURE — 02BK3ZX EXCISION OF RIGHT VENTRICLE, PERCUTANEOUS APPROACH, DIAGNOSTIC: ICD-10-PCS | Performed by: INTERNAL MEDICINE

## 2018-04-09 PROCEDURE — 99153 MOD SED SAME PHYS/QHP EA: CPT

## 2018-04-09 PROCEDURE — 40000986 XR CHEST PORT 1 VW

## 2018-04-09 PROCEDURE — 25000128 H RX IP 250 OP 636

## 2018-04-09 PROCEDURE — 93010 ELECTROCARDIOGRAM REPORT: CPT | Performed by: INTERNAL MEDICINE

## 2018-04-09 PROCEDURE — 4A133B3 MONITORING OF ARTERIAL PRESSURE, PULMONARY, PERCUTANEOUS APPROACH: ICD-10-PCS | Performed by: INTERNAL MEDICINE

## 2018-04-09 PROCEDURE — 76932 ECHO GUIDE FOR HEART BIOPSY: CPT | Mod: 26 | Performed by: INTERNAL MEDICINE

## 2018-04-09 PROCEDURE — C1893 INTRO/SHEATH, FIXED,NON-PEEL: HCPCS

## 2018-04-09 PROCEDURE — 27211181 ZZH BALLOON TIP PRESSURE CR5

## 2018-04-09 PROCEDURE — 20000004 ZZH R&B ICU UMMC

## 2018-04-09 PROCEDURE — 88307 TISSUE EXAM BY PATHOLOGIST: CPT | Performed by: INTERNAL MEDICINE

## 2018-04-09 PROCEDURE — 25000128 H RX IP 250 OP 636: Performed by: INTERNAL MEDICINE

## 2018-04-09 PROCEDURE — 93321 DOPPLER ECHO F-UP/LMTD STD: CPT

## 2018-04-09 PROCEDURE — 40000048 ZZH STATISTIC DAILY SWAN MONITORING

## 2018-04-09 PROCEDURE — 27210787 ZZH MANIFOLD CR2

## 2018-04-09 PROCEDURE — 93456 R HRT CORONARY ARTERY ANGIO: CPT

## 2018-04-09 PROCEDURE — B2111ZZ FLUOROSCOPY OF MULTIPLE CORONARY ARTERIES USING LOW OSMOLAR CONTRAST: ICD-10-PCS | Performed by: INTERNAL MEDICINE

## 2018-04-09 PROCEDURE — 27210742 ZZH CATH CR1

## 2018-04-09 PROCEDURE — 36620 INSERTION CATHETER ARTERY: CPT | Performed by: STUDENT IN AN ORGANIZED HEALTH CARE EDUCATION/TRAINING PROGRAM

## 2018-04-09 PROCEDURE — 25000132 ZZH RX MED GY IP 250 OP 250 PS 637: Performed by: STUDENT IN AN ORGANIZED HEALTH CARE EDUCATION/TRAINING PROGRAM

## 2018-04-09 PROCEDURE — 40000275 ZZH STATISTIC RCP TIME EA 10 MIN

## 2018-04-09 PROCEDURE — 80048 BASIC METABOLIC PNL TOTAL CA: CPT | Performed by: INTERNAL MEDICINE

## 2018-04-09 PROCEDURE — 99291 CRITICAL CARE FIRST HOUR: CPT | Mod: GC | Performed by: INTERNAL MEDICINE

## 2018-04-09 PROCEDURE — C1894 INTRO/SHEATH, NON-LASER: HCPCS

## 2018-04-09 PROCEDURE — 27211089 ZZH KIT ACIST INJECTOR CR3

## 2018-04-09 PROCEDURE — 99152 MOD SED SAME PHYS/QHP 5/>YRS: CPT

## 2018-04-09 PROCEDURE — 00000146 ZZHCL STATISTIC GLUCOSE BY METER IP

## 2018-04-09 PROCEDURE — 36415 COLL VENOUS BLD VENIPUNCTURE: CPT | Performed by: INTERNAL MEDICINE

## 2018-04-09 PROCEDURE — 93454 CORONARY ARTERY ANGIO S&I: CPT | Mod: 26 | Performed by: INTERNAL MEDICINE

## 2018-04-09 PROCEDURE — 25000125 ZZHC RX 250: Performed by: INTERNAL MEDICINE

## 2018-04-09 PROCEDURE — 25000132 ZZH RX MED GY IP 250 OP 250 PS 637: Performed by: INTERNAL MEDICINE

## 2018-04-09 PROCEDURE — 02HQ32Z INSERTION OF MONITORING DEVICE INTO RIGHT PULMONARY ARTERY, PERCUTANEOUS APPROACH: ICD-10-PCS | Performed by: INTERNAL MEDICINE

## 2018-04-09 PROCEDURE — 85049 AUTOMATED PLATELET COUNT: CPT | Performed by: INTERNAL MEDICINE

## 2018-04-09 PROCEDURE — 93005 ELECTROCARDIOGRAM TRACING: CPT

## 2018-04-09 PROCEDURE — 85347 COAGULATION TIME ACTIVATED: CPT

## 2018-04-09 RX ORDER — SODIUM NITROPRUSSIDE 25 MG/ML
100-200 INJECTION INTRAVENOUS
Status: DISCONTINUED | OUTPATIENT
Start: 2018-04-09 | End: 2018-04-09 | Stop reason: HOSPADM

## 2018-04-09 RX ORDER — NITROGLYCERIN 20 MG/100ML
.07-2 INJECTION INTRAVENOUS CONTINUOUS PRN
Status: DISCONTINUED | OUTPATIENT
Start: 2018-04-09 | End: 2018-04-09 | Stop reason: HOSPADM

## 2018-04-09 RX ORDER — POTASSIUM CHLORIDE 7.45 MG/ML
10 INJECTION INTRAVENOUS
Status: DISCONTINUED | OUTPATIENT
Start: 2018-04-09 | End: 2018-04-09 | Stop reason: HOSPADM

## 2018-04-09 RX ORDER — ENALAPRILAT 1.25 MG/ML
1.25-2.5 INJECTION INTRAVENOUS
Status: DISCONTINUED | OUTPATIENT
Start: 2018-04-09 | End: 2018-04-09 | Stop reason: HOSPADM

## 2018-04-09 RX ORDER — NITROGLYCERIN 5 MG/ML
100-200 VIAL (ML) INTRAVENOUS
Status: DISCONTINUED | OUTPATIENT
Start: 2018-04-09 | End: 2018-04-09 | Stop reason: HOSPADM

## 2018-04-09 RX ORDER — FUROSEMIDE 10 MG/ML
20-100 INJECTION INTRAMUSCULAR; INTRAVENOUS
Status: DISCONTINUED | OUTPATIENT
Start: 2018-04-09 | End: 2018-04-09 | Stop reason: HOSPADM

## 2018-04-09 RX ORDER — DEXTROSE MONOHYDRATE 25 G/50ML
12.5-5 INJECTION, SOLUTION INTRAVENOUS EVERY 30 MIN PRN
Status: DISCONTINUED | OUTPATIENT
Start: 2018-04-09 | End: 2018-04-09 | Stop reason: HOSPADM

## 2018-04-09 RX ORDER — PHENYLEPHRINE HCL IN 0.9% NACL 1 MG/10 ML
20-100 SYRINGE (ML) INTRAVENOUS
Status: DISCONTINUED | OUTPATIENT
Start: 2018-04-09 | End: 2018-04-09 | Stop reason: HOSPADM

## 2018-04-09 RX ORDER — ARGATROBAN 1 MG/ML
150 INJECTION, SOLUTION INTRAVENOUS
Status: DISCONTINUED | OUTPATIENT
Start: 2018-04-09 | End: 2018-04-09 | Stop reason: HOSPADM

## 2018-04-09 RX ORDER — HEPARIN SODIUM 5000 [USP'U]/.5ML
5000 INJECTION, SOLUTION INTRAVENOUS; SUBCUTANEOUS EVERY 8 HOURS
Status: DISCONTINUED | OUTPATIENT
Start: 2018-04-09 | End: 2018-04-15 | Stop reason: HOSPADM

## 2018-04-09 RX ORDER — NALOXONE HYDROCHLORIDE 0.4 MG/ML
.1-.4 INJECTION, SOLUTION INTRAMUSCULAR; INTRAVENOUS; SUBCUTANEOUS
Status: DISCONTINUED | OUTPATIENT
Start: 2018-04-09 | End: 2018-04-15 | Stop reason: HOSPADM

## 2018-04-09 RX ORDER — ARGATROBAN 1 MG/ML
350 INJECTION, SOLUTION INTRAVENOUS
Status: DISCONTINUED | OUTPATIENT
Start: 2018-04-09 | End: 2018-04-09 | Stop reason: HOSPADM

## 2018-04-09 RX ORDER — FLUMAZENIL 0.1 MG/ML
0.2 INJECTION, SOLUTION INTRAVENOUS
Status: ACTIVE | OUTPATIENT
Start: 2018-04-09 | End: 2018-04-10

## 2018-04-09 RX ORDER — DIPHENHYDRAMINE HYDROCHLORIDE 50 MG/ML
25-50 INJECTION INTRAMUSCULAR; INTRAVENOUS
Status: DISCONTINUED | OUTPATIENT
Start: 2018-04-09 | End: 2018-04-09 | Stop reason: HOSPADM

## 2018-04-09 RX ORDER — FUROSEMIDE 10 MG/ML
40 INJECTION INTRAMUSCULAR; INTRAVENOUS ONCE
Status: COMPLETED | OUTPATIENT
Start: 2018-04-09 | End: 2018-04-09

## 2018-04-09 RX ORDER — LORAZEPAM 2 MG/ML
.5-2 INJECTION INTRAMUSCULAR EVERY 4 HOURS PRN
Status: DISCONTINUED | OUTPATIENT
Start: 2018-04-09 | End: 2018-04-09 | Stop reason: HOSPADM

## 2018-04-09 RX ORDER — ADENOSINE 3 MG/ML
12-12000 INJECTION, SOLUTION INTRAVENOUS
Status: DISCONTINUED | OUTPATIENT
Start: 2018-04-09 | End: 2018-04-09 | Stop reason: HOSPADM

## 2018-04-09 RX ORDER — ATROPINE SULFATE 0.1 MG/ML
.5-1 INJECTION INTRAVENOUS
Status: DISCONTINUED | OUTPATIENT
Start: 2018-04-09 | End: 2018-04-09 | Stop reason: HOSPADM

## 2018-04-09 RX ORDER — METOPROLOL TARTRATE 1 MG/ML
5 INJECTION, SOLUTION INTRAVENOUS EVERY 5 MIN PRN
Status: DISCONTINUED | OUTPATIENT
Start: 2018-04-09 | End: 2018-04-09 | Stop reason: HOSPADM

## 2018-04-09 RX ORDER — PROTAMINE SULFATE 10 MG/ML
1-5 INJECTION, SOLUTION INTRAVENOUS
Status: DISCONTINUED | OUTPATIENT
Start: 2018-04-09 | End: 2018-04-09 | Stop reason: HOSPADM

## 2018-04-09 RX ORDER — METHYLPREDNISOLONE SODIUM SUCCINATE 125 MG/2ML
125 INJECTION, POWDER, LYOPHILIZED, FOR SOLUTION INTRAMUSCULAR; INTRAVENOUS
Status: DISCONTINUED | OUTPATIENT
Start: 2018-04-09 | End: 2018-04-09 | Stop reason: HOSPADM

## 2018-04-09 RX ORDER — MAGNESIUM HYDROXIDE 1200 MG/15ML
1000 LIQUID ORAL CONTINUOUS PRN
Status: DISCONTINUED | OUTPATIENT
Start: 2018-04-09 | End: 2018-04-09 | Stop reason: HOSPADM

## 2018-04-09 RX ORDER — HEPARIN SODIUM 1000 [USP'U]/ML
1000-10000 INJECTION, SOLUTION INTRAVENOUS; SUBCUTANEOUS EVERY 5 MIN PRN
Status: DISCONTINUED | OUTPATIENT
Start: 2018-04-09 | End: 2018-04-09 | Stop reason: HOSPADM

## 2018-04-09 RX ORDER — LIDOCAINE 40 MG/G
CREAM TOPICAL
Status: DISCONTINUED | OUTPATIENT
Start: 2018-04-09 | End: 2018-04-09 | Stop reason: HOSPADM

## 2018-04-09 RX ORDER — ACETAMINOPHEN 325 MG/1
325 TABLET ORAL EVERY 4 HOURS PRN
Status: DISCONTINUED | OUTPATIENT
Start: 2018-04-09 | End: 2018-04-10

## 2018-04-09 RX ORDER — EPINEPHRINE 1 MG/ML
0.3 INJECTION, SOLUTION, CONCENTRATE INTRAVENOUS
Status: DISCONTINUED | OUTPATIENT
Start: 2018-04-09 | End: 2018-04-09 | Stop reason: HOSPADM

## 2018-04-09 RX ORDER — HYDRALAZINE HYDROCHLORIDE 20 MG/ML
10-20 INJECTION INTRAMUSCULAR; INTRAVENOUS
Status: DISCONTINUED | OUTPATIENT
Start: 2018-04-09 | End: 2018-04-09 | Stop reason: HOSPADM

## 2018-04-09 RX ORDER — LIDOCAINE HYDROCHLORIDE 10 MG/ML
30 INJECTION, SOLUTION EPIDURAL; INFILTRATION; INTRACAUDAL; PERINEURAL
Status: DISCONTINUED | OUTPATIENT
Start: 2018-04-09 | End: 2018-04-09 | Stop reason: HOSPADM

## 2018-04-09 RX ORDER — NITROGLYCERIN 5 MG/ML
100-500 VIAL (ML) INTRAVENOUS
Status: DISCONTINUED | OUTPATIENT
Start: 2018-04-09 | End: 2018-04-09 | Stop reason: HOSPADM

## 2018-04-09 RX ORDER — NALOXONE HYDROCHLORIDE 0.4 MG/ML
.2-.4 INJECTION, SOLUTION INTRAMUSCULAR; INTRAVENOUS; SUBCUTANEOUS
Status: ACTIVE | OUTPATIENT
Start: 2018-04-09 | End: 2018-04-10

## 2018-04-09 RX ORDER — IOPAMIDOL 755 MG/ML
32 INJECTION, SOLUTION INTRAVASCULAR ONCE
Status: COMPLETED | OUTPATIENT
Start: 2018-04-09 | End: 2018-04-09

## 2018-04-09 RX ORDER — SODIUM CHLORIDE 9 MG/ML
INJECTION, SOLUTION INTRAVENOUS CONTINUOUS
Status: DISCONTINUED | OUTPATIENT
Start: 2018-04-09 | End: 2018-04-09 | Stop reason: HOSPADM

## 2018-04-09 RX ORDER — ATROPINE SULFATE 0.1 MG/ML
0.5 INJECTION INTRAVENOUS EVERY 5 MIN PRN
Status: ACTIVE | OUTPATIENT
Start: 2018-04-09 | End: 2018-04-10

## 2018-04-09 RX ORDER — VERAPAMIL HYDROCHLORIDE 2.5 MG/ML
1-5 INJECTION, SOLUTION INTRAVENOUS
Status: DISCONTINUED | OUTPATIENT
Start: 2018-04-09 | End: 2018-04-09 | Stop reason: HOSPADM

## 2018-04-09 RX ORDER — NICARDIPINE HYDROCHLORIDE 2.5 MG/ML
100 INJECTION INTRAVENOUS
Status: DISCONTINUED | OUTPATIENT
Start: 2018-04-09 | End: 2018-04-09 | Stop reason: HOSPADM

## 2018-04-09 RX ORDER — PRASUGREL 10 MG/1
10-60 TABLET, FILM COATED ORAL
Status: DISCONTINUED | OUTPATIENT
Start: 2018-04-09 | End: 2018-04-09 | Stop reason: HOSPADM

## 2018-04-09 RX ORDER — SODIUM CHLORIDE 9 MG/ML
INJECTION, SOLUTION INTRAVENOUS
Status: COMPLETED
Start: 2018-04-09 | End: 2018-04-09

## 2018-04-09 RX ORDER — CLOPIDOGREL BISULFATE 75 MG/1
75 TABLET ORAL
Status: DISCONTINUED | OUTPATIENT
Start: 2018-04-09 | End: 2018-04-09 | Stop reason: HOSPADM

## 2018-04-09 RX ORDER — LIDOCAINE 40 MG/G
CREAM TOPICAL
Status: DISCONTINUED | OUTPATIENT
Start: 2018-04-09 | End: 2018-04-15 | Stop reason: HOSPADM

## 2018-04-09 RX ORDER — FENTANYL CITRATE 50 UG/ML
25-50 INJECTION, SOLUTION INTRAMUSCULAR; INTRAVENOUS
Status: DISCONTINUED | OUTPATIENT
Start: 2018-04-09 | End: 2018-04-09 | Stop reason: HOSPADM

## 2018-04-09 RX ORDER — ONDANSETRON 2 MG/ML
4 INJECTION INTRAMUSCULAR; INTRAVENOUS EVERY 4 HOURS PRN
Status: DISCONTINUED | OUTPATIENT
Start: 2018-04-09 | End: 2018-04-09 | Stop reason: HOSPADM

## 2018-04-09 RX ORDER — NITROGLYCERIN 0.4 MG/1
0.4 TABLET SUBLINGUAL EVERY 5 MIN PRN
Status: DISCONTINUED | OUTPATIENT
Start: 2018-04-09 | End: 2018-04-09 | Stop reason: HOSPADM

## 2018-04-09 RX ORDER — DOBUTAMINE HYDROCHLORIDE 200 MG/100ML
2-20 INJECTION INTRAVENOUS CONTINUOUS PRN
Status: DISCONTINUED | OUTPATIENT
Start: 2018-04-09 | End: 2018-04-09 | Stop reason: HOSPADM

## 2018-04-09 RX ORDER — DOPAMINE HYDROCHLORIDE 160 MG/100ML
2-20 INJECTION, SOLUTION INTRAVENOUS CONTINUOUS PRN
Status: DISCONTINUED | OUTPATIENT
Start: 2018-04-09 | End: 2018-04-09 | Stop reason: HOSPADM

## 2018-04-09 RX ORDER — HYDROMORPHONE HYDROCHLORIDE 1 MG/ML
0.5 INJECTION, SOLUTION INTRAMUSCULAR; INTRAVENOUS; SUBCUTANEOUS ONCE
Status: COMPLETED | OUTPATIENT
Start: 2018-04-09 | End: 2018-04-09

## 2018-04-09 RX ORDER — ASPIRIN 325 MG
325 TABLET ORAL
Status: DISCONTINUED | OUTPATIENT
Start: 2018-04-09 | End: 2018-04-09 | Stop reason: HOSPADM

## 2018-04-09 RX ORDER — EPTIFIBATIDE 2 MG/ML
2 INJECTION, SOLUTION INTRAVENOUS CONTINUOUS PRN
Status: DISCONTINUED | OUTPATIENT
Start: 2018-04-09 | End: 2018-04-09 | Stop reason: HOSPADM

## 2018-04-09 RX ORDER — ASPIRIN 81 MG/1
81-324 TABLET, CHEWABLE ORAL
Status: DISCONTINUED | OUTPATIENT
Start: 2018-04-09 | End: 2018-04-09 | Stop reason: HOSPADM

## 2018-04-09 RX ORDER — NIFEDIPINE 10 MG/1
10 CAPSULE ORAL
Status: DISCONTINUED | OUTPATIENT
Start: 2018-04-09 | End: 2018-04-09 | Stop reason: HOSPADM

## 2018-04-09 RX ORDER — CLOPIDOGREL BISULFATE 75 MG/1
300-600 TABLET ORAL
Status: DISCONTINUED | OUTPATIENT
Start: 2018-04-09 | End: 2018-04-09 | Stop reason: HOSPADM

## 2018-04-09 RX ORDER — PROMETHAZINE HYDROCHLORIDE 25 MG/ML
6.25-25 INJECTION, SOLUTION INTRAMUSCULAR; INTRAVENOUS EVERY 4 HOURS PRN
Status: DISCONTINUED | OUTPATIENT
Start: 2018-04-09 | End: 2018-04-09 | Stop reason: HOSPADM

## 2018-04-09 RX ORDER — FLUMAZENIL 0.1 MG/ML
0.2 INJECTION, SOLUTION INTRAVENOUS
Status: DISCONTINUED | OUTPATIENT
Start: 2018-04-09 | End: 2018-04-09 | Stop reason: HOSPADM

## 2018-04-09 RX ORDER — POTASSIUM CHLORIDE 29.8 MG/ML
20 INJECTION INTRAVENOUS
Status: DISCONTINUED | OUTPATIENT
Start: 2018-04-09 | End: 2018-04-09 | Stop reason: HOSPADM

## 2018-04-09 RX ORDER — PROTAMINE SULFATE 10 MG/ML
25-100 INJECTION, SOLUTION INTRAVENOUS EVERY 5 MIN PRN
Status: DISCONTINUED | OUTPATIENT
Start: 2018-04-09 | End: 2018-04-09 | Stop reason: HOSPADM

## 2018-04-09 RX ORDER — EPTIFIBATIDE 2 MG/ML
180 INJECTION, SOLUTION INTRAVENOUS EVERY 10 MIN PRN
Status: DISCONTINUED | OUTPATIENT
Start: 2018-04-09 | End: 2018-04-09 | Stop reason: HOSPADM

## 2018-04-09 RX ORDER — NALOXONE HYDROCHLORIDE 0.4 MG/ML
0.4 INJECTION, SOLUTION INTRAMUSCULAR; INTRAVENOUS; SUBCUTANEOUS EVERY 5 MIN PRN
Status: DISCONTINUED | OUTPATIENT
Start: 2018-04-09 | End: 2018-04-09 | Stop reason: HOSPADM

## 2018-04-09 RX ADMIN — FUROSEMIDE 40 MG: 10 INJECTION, SOLUTION INTRAVENOUS at 21:01

## 2018-04-09 RX ADMIN — HEPARIN SODIUM 5000 UNITS: 5000 INJECTION, SOLUTION INTRAVENOUS; SUBCUTANEOUS at 18:42

## 2018-04-09 RX ADMIN — FENTANYL CITRATE 50 MCG: 50 INJECTION, SOLUTION INTRAMUSCULAR; INTRAVENOUS at 14:55

## 2018-04-09 RX ADMIN — HYDROMORPHONE HYDROCHLORIDE 0.5 MG: 1 INJECTION, SOLUTION INTRAMUSCULAR; INTRAVENOUS; SUBCUTANEOUS at 22:17

## 2018-04-09 RX ADMIN — SODIUM CHLORIDE: 9 INJECTION, SOLUTION INTRAVENOUS at 13:08

## 2018-04-09 RX ADMIN — MIDAZOLAM 1 MG: 1 INJECTION INTRAMUSCULAR; INTRAVENOUS at 14:56

## 2018-04-09 RX ADMIN — SODIUM THIOSULFATE 0.25 MCG/KG/MIN: 250 INJECTION, SOLUTION INTRAVENOUS at 18:32

## 2018-04-09 RX ADMIN — HEPARIN SODIUM 500 UNITS: 1000 INJECTION, SOLUTION INTRAVENOUS; SUBCUTANEOUS at 14:56

## 2018-04-09 RX ADMIN — ACETAMINOPHEN 325 MG: 325 TABLET, FILM COATED ORAL at 21:01

## 2018-04-09 RX ADMIN — NITROGLYCERIN 200 MCG: 5 INJECTION, SOLUTION INTRAVENOUS at 15:15

## 2018-04-09 RX ADMIN — ASPIRIN 325 MG ORAL TABLET 325 MG: 325 PILL ORAL at 13:08

## 2018-04-09 RX ADMIN — HEPARIN SODIUM 1500 UNITS: 1000 INJECTION, SOLUTION INTRAVENOUS; SUBCUTANEOUS at 14:55

## 2018-04-09 RX ADMIN — SODIUM CHLORIDE 500 ML: 9 INJECTION, SOLUTION INTRAVENOUS at 18:42

## 2018-04-09 RX ADMIN — IOPAMIDOL 32 ML: 755 INJECTION, SOLUTION INTRAVASCULAR at 16:00

## 2018-04-09 ASSESSMENT — ACTIVITIES OF DAILY LIVING (ADL)
RETIRED_EATING: 0-->INDEPENDENT
BATHING: 0-->INDEPENDENT
RETIRED_COMMUNICATION: 0-->UNDERSTANDS/COMMUNICATES WITHOUT DIFFICULTY
TOILETING: 0-->INDEPENDENT
SWALLOWING: 0-->SWALLOWS FOODS/LIQUIDS WITHOUT DIFFICULTY
FALL_HISTORY_WITHIN_LAST_SIX_MONTHS: NO
COGNITION: 0 - NO COGNITION ISSUES REPORTED
TRANSFERRING: 0-->INDEPENDENT
AMBULATION: 0-->INDEPENDENT
DRESS: 0-->INDEPENDENT

## 2018-04-09 NOTE — PROVIDER NOTIFICATION
Dr Rodarte notified pts BP elevated: 150/110, 167/109. Pt did not take his BP meds this morning.   MD to place orders.

## 2018-04-09 NOTE — PROGRESS NOTES
Patient arrived to 2A with family/significant other for CORS/RHC and/or biopsy. Patient assessed. /110. Per patient's girlfriend, patient did not take AM BP meds because it needs to be taken with food. Patient is NPO per protocol. Creat 1.53. IV inserted/fluids infusing.  mg administered. Consent needs to be signed.

## 2018-04-09 NOTE — H&P
Cardiology 2 History and Physical  Palomo Patterson MRN: [id number]  Age: 37 year old, : 1980  Primary care provider: No Ref-Primary, Physician           Chief Complaint:     Biventricular heart failure          History of Present Illness:     History is obtained from the patient and chart review.    Palomo Patterson is 37 year old [place]  male with a history of biventricular acute on chronic congestive heart failure, recurrent methamphetamine use, family history of cardiomyopathy in mother, and cigarette use who presents for further management of biventricular heart failure.    Follows with Dr. Freitas, last seen in clinic on 18. Patient is not a candidate for advanced therapies due to his chemical dependency. Last used meth yesterday. Had scheduled coronary angiogram and RHC with biopsy today which showed CI 1.5, EF 5-10% on . No CAD on coronary angiogram.  mg administered.     Notes subjective fevers for 1 day. 1 chills. Has had SOB with NEWMAN over the last 2 weeks. Denies orthopnea. Sleeps on 2 pillows. No PND. No chest pain, abdominal pina, N/V, hematochezia, melena, dysuria, or hematuria. Notes that he chronically alternates between diarrhea and constipation after his appendectomy in .          Past Medical History:     Past Medical History:   Diagnosis Date     Hypertension               Past Surgical History:      Past Surgical History:   Procedure Laterality Date     ABDOMEN SURGERY                Social History:     Social History     Social History     Marital status: Single     Spouse name: N/A     Number of children: N/A     Years of education: N/A     Occupational History     Not on file.     Social History Main Topics     Smoking status: Current Some Day Smoker     Packs/day: 0.25     Types: Cigarettes     Smokeless tobacco: Never Used     Alcohol use No      Comment: social events only 1 drink 3-4 times per year     Drug use: 3.00 per week     Special:  Methamphetamines      Comment: 1 gram 2-3 times per week     Sexual activity: Not on file     Other Topics Concern     Not on file     Social History Narrative              Family History:     Family History   Problem Relation Age of Onset     Depression Mother      Anxiety Disorder Mother      Myocardial Infarction Mother      age of onset unknown     Myocardial Infarction Father      age of onset unknown     Cardiac Sudden Death Brother      had sudden cardiac death at 36     Family history reviewed and updated in EPIC          Allergies:     Allergies   Allergen Reactions     Atorvastatin Cough              Medications:     Prescriptions Prior to Admission   Medication Sig Dispense Refill Last Dose     carvedilol (COREG) 3.125 MG tablet Take 1 tablet (3.125 mg total) by mouth 2 (two) times a day with meals.   4/8/2018 at [facility]     furosemide (LASIX) 20 MG tablet Take 1 tablet (20 mg total) by mouth 2 (two) times a day at 9am and 6pm.   4/8/2018 at [facility]     UNABLE TO FIND MEDICATION NAME: metoprolol   4/8/2018 at AM     LOSARTAN POTASSIUM PO Take 50 mg by mouth daily   4/8/2018 at AM     potassium chloride SA (K-DUR/KLOR-CON M) 20 MEQ CR tablet Take 1 tablet (20 mEq) by mouth daily 60 tablet 3 4/8/2018 at AM     benzonatate (TESSALON) 100 MG capsule Take 2 capsules (200 mg) by mouth 3 times daily as needed for cough 25 capsule 1 4/8/2018 at [facility]     losartan-hydrochlorothiazide (HYZAAR) 50-12.5 MG per tablet Take 1 tablet by mouth daily   4/8/2018 at AM     AMLODIPINE BESYLATE PO Take 10 mg by mouth daily   Unknown at Unknown time                    Physical Exam:     B/P: 167/109, T: 97.6, P: 119, R: 18    Wt Readings from Last 4 Encounters:   04/09/18 74.4 kg (164 lb)   04/05/18 78.6 kg (173 lb 3.2 oz)       Gen: No acute distress, laying supine in bed with head of bed higher than feet  HEENT: NC/AT, PERRL, EOM intact, MMM, OP without exudates  PULM/THORAX: Clear to auscultation bilaterally, no  rales/rhonchi/wheezes, on 2 L NC  CV: RRR, normal S1 and S2, no murmurs or rubs  ABD: Soft, NTND, bowel sounds present, no masses  EXT: WWP. No LE edema or cyanosis. 2+ bilateral radial and pedal pulses  NEURO: CN II-XII grossly intact. A&Ox3              Data:     Labs Reviewed on Admission    Recent Results (from the past 24 hour(s))   Basic metabolic panel    Collection Time: 04/09/18 12:00 PM   Result Value Ref Range    Sodium 138 133 - 144 mmol/L    Potassium 4.1 3.4 - 5.3 mmol/L    Chloride 105 94 - 109 mmol/L    Carbon Dioxide 26 20 - 32 mmol/L    Anion Gap 7 3 - 14 mmol/L    Glucose 89 70 - 99 mg/dL    Urea Nitrogen 19 7 - 30 mg/dL    Creatinine 1.53 (H) 0.66 - 1.25 mg/dL    GFR Estimate 52 (L) >60 mL/min/1.7m2    GFR Estimate If Black 62 >60 mL/min/1.7m2    Calcium 8.5 8.5 - 10.1 mg/dL   EKG 12-lead, tracing only    Collection Time: 04/09/18 12:49 PM   Result Value Ref Range    Interpretation ECG Click View Image link to view waveform and result    EKG 12 lead    Collection Time: 04/09/18  5:40 PM   Result Value Ref Range    Interpretation ECG Click View Image link to view waveform and result    Glucose by meter    Collection Time: 04/09/18  5:42 PM   Result Value Ref Range    Glucose 93 70 - 99 mg/dL   Activated clotting time POCT    Collection Time: 04/09/18  6:16 PM   Result Value Ref Range    Activated Clot Time 111 75 - 150 sec               Most Recent Imaging:     TTE 4/9/18:  Interpretation Summary  Limited study during echo-guided endomyocardial biopsy  LV function is globally reduced  Global right ventricular function is severely reduced.  No complications noted.  _____________________________________________________________________________  __        Left Ventricle  Limited study during echo-guided endomyocardial biopsy. The Ejection Fraction  is estimated at 5-10%. LV function is globally reduced.     Right Ventricle  Global right ventricular function is severely reduced.     Atria  Mild left  atrial enlargement is present. Mild right atrial enlargement is  present.     Mitral Valve  Mild mitral insufficiency is present.     Aortic Valve  Trace aortic insufficiency is present.     Pericardium  No pericardial effusion is present.      TTE 3/6/18:        Preliminary RHC and coronary angiogram report 4/9/18:    A 7F long daig sheath employed via RIJV with ultrasound guidance.    5.5F bioptome used to obtain 4x EM biopsy samples from the right ventricle, sent to pathology for analysis.    A 4F sheath & catheters employed via Rt FA with ultrasound guidance.  We replaced RHC from right IJ with ultrasound. We have left swan cath from the right IJ.     RHC  Hemodynamic study: See the details on the report.   RA 21 RV74/30 PA 78/46/62 PCW 25  Carolyn CO 2.6, CI 1.5 TD CO 3.2, CI 1.8  PA sat 44.2% Hgb 13.4g/dl PVR 14.4 TPR 24.1  Coronary Angiogram  LMT with no disease.  LAD type 3 vessel, gives rise to D1, D2, and D3, without disease.  LCX gives rise to a large OM1, medium sized OM2, & small OM3 with no disease.  RCA (dominant) gives rise to PL & PDA, without disease.        Medications  Contrast: Isovue, 32 ml   Fluoroscopy Time: 8.5 min     SUMMARY:   1. Normal coronary angiogram  2. elevated PA pressue, mildly elevated RA, RV filling, and PCW pressures.  3. Reduced cardiac index.  4. Heart biopsy sent, result pending     PLAN:   1. Admit to [facility],  cath left from right IJ.   3. Bedrest per protocol.           Assessment and Plan:     Palomo Patterson is 37 year old [place]  male with a history of biventricular acute on chronic congestive heart failure, recurrent methamphetamine use, family history of cardiomyopathy in mother, and cigarette use who presents for further management of biventricular heart failure.    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 Last used methamphetamine on 4/8. CI 1.5, EF 5-10% on 4/9. No CAD on coronary angiogram.   -- Hold PTA  coreg 3.125 mg BID, losartan-HCTZ 50/12.5, and amlodipine  -- Hold PTA lasix 20 mg BID. Gave lasix 40 mg IV once for CVP 16.  -- Started nipride gtt, will initially titrate to CI>1.8  -- Hemodynamics q4h  -- Will place radial arterial line tonight  -- Follow-up biopsy results    RENUKA  Baseline Cr ~1.1-1.2 in 1/2017. Cr ~1.5 in 4/2018. Likely cardiorenal syndrome.  -- Continue to monitor    FEN: 2 g Na, 2 L fluid restriction  PPX: SQ heparin  Code Status: FULL CODE     To be formally staffed in AM.    Betty Vuong MD, PhD  Internal Medicine Resident  Cardiology Service  Pager: [phone]                  I have reviewed today's vital signs, notes, medications, labs and imaging.  I have also seen and examined the patient and agree with the findings and plan as outlined above.  Pt is a 38 yo male with hx of meth use (last 2 days ago), familial cardiomyopathy who presents from the cath lab with cardiogenic shock.  Pt reports progressive fatigue, SOB, NEWMAN, nausea, abd discomfort and light headedness. LHC reveals nl cors and bx results pending.  HR 130s with CI 1.5. Lungs clear and tachy S1 and S2 with loud S3.  Labs WNL.  Assessment: Pt with recreational drug use and now in cardiogenic shock.  Plan for nipride and will uptitrate oral afterload reducing agents.  Pt seen X3 for total critical care time 50 min.     Tony Garcia MD, PhD  Professor, Heart Failure and Cardiac Transplantation  [facility]

## 2018-04-09 NOTE — PROCEDURES
Preliminary RHC and Coronary Angiogram Report    Attending Cardiology Staff: Jovany Bernal MD  Cardiology Fellow: Quang Rodarte MD    A 7F long daig sheath employed via RIJV with ultrasound guidance.    5.5F bioptome used to obtain 4x EM biopsy samples from the right ventricle, sent to pathology for analysis.    A 4F sheath & catheters employed via Rt FA with ultrasound guidance.  We replaced RHC from right IJ with ultrasound. We have left swan cath from the right IJ.    RHC  Hemodynamic study: See the details on the report.   RA 21 RV74/30 PA 78/46/62 PCW 25  Carolyn CO 2.6, CI 1.5 TD CO 3.2, CI 1.8  PA sat 44.2% Hgb 13.4g/dl PVR 14.4 TPR 24.1  Coronary Angiogram  LMT with no disease.  LAD type 3 vessel, gives rise to D1, D2, and D3, without disease.  LCX gives rise to a large OM1, medium sized OM2, & small OM3 with no disease.  RCA (dominant) gives rise to PL & PDA, without disease.      Medications  Contrast: Isovue, 32 ml   Fluoroscopy Time: 8.5 min    SUMMARY:   1. Normal coronary angiogram  2. elevated PA pressue, mildly elevated RA, RV filling, and PCW pressures.  3. Reduced cardiac index.  4. Heart biopsy sent, result pending    PLAN:   1. Admit to [facility],  cath left from right IJ.   3. Bedrest per protocol.    The attending interventional cardiologist was present for the entire procedure.      ???????????????????  Cardiology Fellow  ??????????????????

## 2018-04-09 NOTE — IP AVS SNAPSHOT
MRN:[id number]                      After Visit Summary   4/9/2018    Palomo Patterson    MRN: [id number]           Thank you!     Thank you for choosing [facility] for your care. Our goal is always to provide you with excellent care. Hearing back from our patients is one way we can continue to improve our services. Please take a few minutes to complete the written survey that you may receive in the mail after you visit with us. Thank you!        Patient Information     Date Of Birth          1980        Designated Caregiver       Most Recent Value    Caregiver    Will someone help with your care after discharge? yes    Name of designated caregiver [facility]    Phone number of caregiver [phone]    Caregiver address [address] [address]      About your hospital stay     You were admitted on:  April 9, 2018 You last received care in the:  [address] [facility]    You were discharged on:  April 15, 2018        Reason for your hospital stay       Cardiogenic Shock  Heart Failure due to substance abuse with some component of familial contribution                  Who to Call     For medical emergencies, please call 911.  For non-urgent questions about your medical care, please call your primary care provider or clinic, None          Attending Provider     Provider Specialty    Mihir Shelton MD Cardiology    Gabe, Jovany Loza MD Cardiology    Tony Garcia MD Cardiology       Primary Care Provider Fax #    Physician No Ref-Primary [phone]      After Care Instructions     Activity       Your activity upon discharge: activity as tolerated            Diet       Follow this diet upon discharge: Orders Placed This Encounter  Cardiac Diet      Fluid restriction 2000 ML FLUID      2 Gram Sodium Diet            Discharge Instructions       You were admitted with significant shortness of breath and dyspnea on exertion.     We believe that this is due to your heart not squeezing  appropriately but also not filling with blood appropriately. It is important that you continue to take the medications we started you on appropriately to ensure that your heart function does not deteriorate further. In addition, we highly recommend you stop using methamphetamine and cigarettes.     You will need to follow up with your primary care doctor within 7-10 days, a heart failure specialist within 1 month, and we have placed a pulmonologist (lung specialist) referral for you as well to address your concerns per your request and placed a referral for smoking cessation.  Please discuss options for chemical dependency with your primary care physician.            Monitor and record       weight every other day                  Follow-up Appointments     Adult [facility] Follow-up and recommended labs and tests       Follow up with primary care provider, Physician No Ref-Primary, within 7 days to evaluate medication change, to evaluate treatment change and regarding new diagnosis and hospital stay. We recommend repeat BMP and Mg    Please follow up with the advanced heart failure clinic; your cardiologist is Dr. Freitas. We will place a referral to ensure you are seen within 1-2 weeks by an NP in the [facility] and within 1-2 months by  your cardiologist.    Appointments on [place] and/or [facility] (with [facility] or [facility] provider or service). Call [phone] if you haven't heard regarding these appointments within 7 days of discharge.                  Additional Services     Follow-Up with Advanced Heart Failure Clinic           Follow-Up with [facility]       Please schedule appt with [facility] in 1 week for hospitalization follow up and HTN and HFrEF management.            Pulmonary Medicine Referral       Patient with chronic cough for ~6 months, Hx of significant smoking, FPC time ~5years (reportedly tested for tb, no evidence of cav. Lesions on CT).   IN addition significant HF with EF  ~5%.   Request by patient on discharge to manage cough, address smoking.            Tobacco Cessation Qic Referral                 General Recommendations To Control Heart Failure When You Get Home       Heart Failure Instructions for Patients and Families: Please read and check off each of these important instructions as you do them when you get home.          Weight and Symptoms       ___ Put a scale in your bathroom.    ___ Post a weight chart or calendar next to your scale.    ___ Weigh yourself everyday as soon as you get up in the morning (before breakfast).  You should only be wearing your pajamas.  Write your weight on the chart/calendar.  ___ Bring your weight chart/calendar with you to all appointments.  ___ Call your doctor or nurse practitioner if you gain 2 pounds (in 1 day) or 5 pounds in (1 week) from your goal  good  weight.  Your good weight is also called your  dry  weight.  Your doctor or nurse will tell you what your good weight should be.    ___ Call your doctor or nurse practitioner if you have shortness of breath that gets worse over time, leg swelling or fatigue.       Medications and Diet       ___ Make sure to take your medication as prescribed.    ___ Bring a current list of your medication and all of your medicine bottles with you to all appointments.    ___ Limit fluids if you still have swelling or shortness of breath, or if your doctor tells you to do so.    ___ Eat less than 2000 mg of sodium (salt) every day. Read food labels, and do not add salt to meals. Remember, if you eat less salt you retain less fluid.  ___ Follow a heart healthy diet that is low in saturated fat.        Activity and Suggested Lifestyle Changes      ___ Stay active. Talk to your doctor about an exercise program that is safe for your heart.  ___ Stop smoking. Reduce alcohol use.      ___ Lose weight if you are overweight. Extra weight puts a lot of stress on the heart.                 Control for Leg Swelling      ___ Keep your legs elevated (raised) as needed for swelling. If swelling is uncomfortable or elevation doesn t help, ask your doctor about using ACE wraps or support stockings.        What is the [facility] Clinic?  Cardiomyopathy  Optimization  Rehabilitation  Education    The [facility] Clinic is a heart failure specialty clinic within [facility].  It is an outpatient disease management program that is based on a phase-by-phase approach, which is tailored to each patient s individual needs.  The cardiologist, nurse practitioner, physician assistant and nurses provide an ongoing outpatient care and treatment plan that guides heart failure and cardiomyopathy patients from evaluation and education to stabilization. This team works with your current primary care doctor and cardiologist to help you:    - Avoid hospitalizations  - Slow the progression of the disease  - Improve length and quality of life  - Know who and when to call if heart failure symptoms appear  - Receive easy access to quality health care and advice  - Better understand your condition and treatment  - Decrease the tremendous cost burden of heart failure care  - Detect future heart problems before they become life threatening                                 Follow-up Appointments     ___ An appointment with the [facility] may already have been made for you (see section   Your next appointments already scheduled ).  If you have a question about your appointment, would like to speak with a C.O.R.E. nurse, or would like to become a [facility] Clinic patient, please call one of the following locations:     - [facility]):                                             [phone]  - [facility]):                                            [phone]  - [facility] ([place]):                 [phone]      ___ Your C.O.R.E. Clinic Team will continue to educate  "you on your heart failure and may adjust medications based on your vital signs, lab work, and how you are feeling.  Therefore, it is very important to bring the following to all C.O.R.E. appointments:    - An accurate list of your medications  - Your medicine bottles  - Your weight chart/calendar                   Pending Results     No orders found from 2018 to 4/10/2018.            Statement of Approval     Ordered          04/15/18 1311  I have reviewed and agree with all the recommendations and orders detailed in this document.  EFFECTIVE NOW     Approved and electronically signed by:  Payton Johnson MD             Admission Information     Date & Time Provider Department Dept. Phone    2018 Tony Garcia MD [address] [facility] [phone]      Your Vitals Were     Blood Pressure Pulse Temperature Respirations Height Weight    103/75 (BP Location: Right arm) 106 97.6  F (36.4  C) (Oral) 18 1.6 m (5' 3\") 73.3 kg (161 lb 9.6 oz)    Pulse Oximetry BMI (Body Mass Index)                97% 28.63 kg/m2          [employer] Information     [employer] lets you send messages to your doctor, view your test results, renew your prescriptions, schedule appointments and more. To sign up, go to [email or web address] . Click on \"Log in\" on the left side of the screen, which will take you to the Welcome page. Then click on \"Sign up Now\" on the right side of the page.     You will be asked to enter the access code listed below, as well as some personal information. Please follow the directions to create your username and password.     Your access code is: [email or web address]  Expires: 2018  1:48 PM     Your access code will  in 90 days. If you need help or a new code, please call your [facility] clinic or [phone].        Care EveryWhere ID     This is your Care EveryWhere ID. This could be used by other organizations to access your [facility] medical records  [id number]        Equal Access to " Services     [facility]: Had[facility] aad miguel King, waaxda luqadaha, qaybta kaalmatootie hardin, blue pike . So [facility] [phone].    ATENCIÓN: Si habla español, tiene a villavicencio disposición servicios gratuitos de asistencia lingüística. Llame al [phone].    We comply with applicable federal civil rights laws and [place] laws. We do not discriminate on the basis of race, color, national origin, age, disability, sex, sexual orientation, or gender identity.               Review of your medicines      START taking        Dose / Directions    hydrALAZINE 100 MG Tabs tablet   Commonly known as:  APRESOLINE        Dose:  100 mg   Take 1 tablet (100 mg) by mouth 3 times daily   Quantity:  120 tablet   Refills:  1       isosorbide mononitrate 60 MG 24 hr tablet   Commonly known as:  IMDUR   Used for:  Acute on chronic combined systolic and diastolic congestive heart failure (H), Benign essential hypertension        Dose:  60 mg   Take 1 tablet (60 mg) by mouth every morning   Quantity:  30 tablet   Refills:  1         CONTINUE these medicines which may have CHANGED, or have new prescriptions. If we are uncertain of the size of tablets/capsules you have at home, strength may be listed as something that might have changed.        Dose / Directions    amLODIPine 2.5 MG tablet   Commonly known as:  NORVASC   This may have changed:    - how much to take  - when to take this   Used for:  Acute on chronic combined systolic and diastolic congestive heart failure (H), Benign essential hypertension        Dose:  5 mg   Take 2 tablets (5 mg) by mouth At Bedtime   Quantity:  30 tablet   Refills:  1       carvedilol 3.125 MG tablet   Commonly known as:  COREG   This may have changed:  See the new instructions.   Used for:  Acute on chronic combined systolic and diastolic congestive heart failure (H)        Dose:  6.25 mg   Take 2 tablets (6.25 mg) by mouth 2 times daily (with meals)   Quantity:  60  tablet   Refills:  1       losartan 50 MG tablet   Commonly known as:  COZAAR   This may have changed:    - medication strength  - how much to take  - how to take this  - when to take this  - additional instructions   Used for:  Acute on chronic combined systolic and diastolic congestive heart failure (H)        Take 50mg every morning and 100mg every night   Quantity:  90 tablet   Refills:  1         CONTINUE these medicines which have NOT CHANGED        Dose / Directions    benzonatate 100 MG capsule   Commonly known as:  TESSALON   Used for:  Acute on chronic combined systolic and diastolic congestive heart failure (H)        Dose:  200 mg   Take 2 capsules (200 mg) by mouth 3 times daily as needed for cough   Quantity:  25 capsule   Refills:  1         STOP taking     furosemide 20 MG tablet   Commonly known as:  LASIX           losartan-hydrochlorothiazide 50-12.5 MG per tablet   Commonly known as:  HYZAAR           potassium chloride SA 20 MEQ CR tablet   Commonly known as:  K-DUR/KLOR-CON M           UNABLE TO FIND                Where to get your medicines      These medications were sent to [facility] [address] [address]     Phone:  [phone]     amLODIPine 2.5 MG tablet    carvedilol 3.125 MG tablet    hydrALAZINE 100 MG Tabs tablet    isosorbide mononitrate 60 MG 24 hr tablet    losartan 50 MG tablet                Protect others around you: Learn how to safely use, store and throw away your medicines at www.disposemymeds.org.             Medication List: This is a list of all your medications and when to take them. Check marks below indicate your daily home schedule. Keep this list as a reference.      Medications           Morning Afternoon Evening Bedtime As Needed    amLODIPine 2.5 MG tablet   Commonly known as:  NORVASC   Take 2 tablets (5 mg) by mouth At Bedtime   Last time this was given:  5 mg on 4/14/2018 10:16 PM                                    benzonatate 100 MG capsule   Commonly known as:  TESSALON   Take 2 capsules (200 mg) by mouth 3 times daily as needed for cough                                   carvedilol 3.125 MG tablet   Commonly known as:  COREG   Take 2 tablets (6.25 mg) by mouth 2 times daily (with meals)   Last time this was given:  6.25 mg on 4/15/2018  8:10 AM                                      hydrALAZINE 100 MG Tabs tablet   Commonly known as:  APRESOLINE   Take 1 tablet (100 mg) by mouth 3 times daily   Last time this was given:  100 mg on 4/15/2018  8:10 AM                                         isosorbide mononitrate 60 MG 24 hr tablet   Commonly known as:  IMDUR   Take 1 tablet (60 mg) by mouth every morning   Last time this was given:  60 mg on 4/15/2018  8:11 AM                                   losartan 50 MG tablet   Commonly known as:  COZAAR   Take 50mg every morning and 100mg every night   Last time this was given:  50 mg on 4/15/2018  8:10 AM                                             More Information        Amlodipine Besylate Oral tablet  What is this medicine?  AMLODIPINE (am MIKE chou) is a calcium-channel blocker. It affects the amount of calcium found in your heart and muscle cells. This relaxes your blood vessels, which can reduce the amount of work the heart has to do. This medicine is used to lower high blood pressure. It is also used to prevent chest pain.  This medicine may be used for other purposes; ask your health care provider or pharmacist if you have questions.  What should I tell my health care provider before I take this medicine?  They need to know if you have any of these conditions:    heart problems like heart failure or aortic stenosis    liver disease    an unusual or allergic reaction to amlodipine, other medicines, foods, dyes, or preservatives    pregnant or trying to get pregnant    breast-feeding  How should I use this medicine?  Take this medicine by mouth  with a glass of water. Follow the directions on the prescription label. Take your medicine at regular intervals. Do not take more medicine than directed.  Talk to your pediatrician regarding the use of this medicine in children. Special care may be needed. This medicine has been used in children as young as 6.  Persons over 65 years old may have a stronger reaction to this medicine and need smaller doses.  Overdosage: If you think you have taken too much of this medicine contact a poison control center or emergency room at once.  NOTE: This medicine is only for you. Do not share this medicine with others.  What if I miss a dose?  If you miss a dose, take it as soon as you can. If it is almost time for your next dose, take only that dose. Do not take double or extra doses.  What may interact with this medicine?    herbal or dietary supplements    local or general anesthetics    medicines for high blood pressure    medicines for prostate problems    rifampin  This list may not describe all possible interactions. Give your health care provider a list of all the medicines, herbs, non-prescription drugs, or dietary supplements you use. Also tell them if you smoke, drink alcohol, or use illegal drugs. Some items may interact with your medicine.  What should I watch for while using this medicine?  Visit your doctor or health care professional for regular check ups. Check your blood pressure and pulse rate regularly. Ask your health care professional what your blood pressure and pulse rate should be, and when you should contact him or her.  This medicine may make you feel confused, dizzy or lightheaded. Do not drive, use machinery, or do anything that needs mental alertness until you know how this medicine affects you. To reduce the risk of dizzy or fainting spells, do not sit or stand up quickly, especially if you are an older patient. Avoid alcoholic drinks; they can make you more dizzy.  Do not suddenly stop taking  amlodipine. Ask your doctor or health care professional how you can gradually reduce the dose.  What side effects may I notice from receiving this medicine?  Side effects that you should report to your doctor or health care professional as soon as possible:    allergic reactions like skin rash, itching or hives, swelling of the face, lips, or tongue    breathing problems    changes in vision or hearing    chest pain    fast, irregular heartbeat    swelling of legs or ankles  Side effects that usually do not require medical attention (report to your doctor or health care professional if they continue or are bothersome):    dry mouth    facial flushing    nausea, vomiting    stomach gas, pain    tired, weak    trouble sleeping  This list may not describe all possible side effects. Call your doctor for medical advice about side effects. You may report side effects to FDA at 8-850-FDA-8415.  Where should I keep my medicine?  Keep out of the reach of children.  Store at room temperature between 59 and 86 degrees F (15 and 30 degrees C). Protect from light. Keep container tightly closed. Throw away any unused medicine after the expiration date.  NOTE:This sheet is a summary. It may not cover all possible information. If you have questions about this medicine, talk to your doctor, pharmacist, or health care provider. Copyright  2016 [employer]                Patient Education    Carvedilol Oral capsule, extended-release    Carvedilol Oral tablet  Carvedilol Oral tablet  What is this medicine?  CARVEDILOL (RICHA ve dil ol) is a beta-blocker. Beta-blockers reduce the workload on the heart and help it to beat more regularly. This medicine is used to treat high blood pressure and heart failure.  This medicine may be used for other purposes; ask your health care provider or pharmacist if you have questions.  What should I tell my health care provider before I take this medicine?  They need to know if you have any of these  conditions:    circulation problems    diabetes    history of heart attack or heart disease    liver disease    lung or breathing disease, like asthma or emphysema    pheochromocytoma    slow or irregular heartbeat    thyroid disease    an unusual or allergic reaction to carvedilol, other beta-blockers, medicines, foods, dyes, or preservatives    pregnant or trying to get pregnant    breast-feeding  How should I use this medicine?  Take this medicine by mouth with a glass of water. Follow the directions on the prescription label. It is best to take the tablets with food. Take your doses at regular intervals. Do not take your medicine more often than directed. Do not stop taking except on the advice of your doctor or health care professional.  Talk to your pediatrician regarding the use of this medicine in children. Special care may be needed.  Overdosage: If you think you have taken too much of this medicine contact a poison control center or emergency room at once.  NOTE: This medicine is only for you. Do not share this medicine with others.  What if I miss a dose?  If you miss a dose, take it as soon as you can. If it is almost time for your next dose, take only that dose. Do not take double or extra doses.  What may interact with this medicine?  This medicine may interact with the following medications:    certain medicines for blood pressure, heart disease, irregular heart beat    certain medicines for depression, like fluoxetine or paroxetine    certain medicines for diabetes, like glipizide or glyburide    cimetidine    clonidine    cyclosporine    digoxin    MAOIs like Carbex, Eldepryl, Marplan, Nardil, and Parnate    reserpine    rifampin  This list may not describe all possible interactions. Give your health care provider a list of all the medicines, herbs, non-prescription drugs, or dietary supplements you use. Also tell them if you smoke, drink alcohol, or use illegal drugs. Some items may interact with  your medicine.  What should I watch for while using this medicine?  Check your heart rate and blood pressure regularly while you are taking this medicine. Ask your doctor or health care professional what your heart rate and blood pressure should be, and when you should contact him or her. Do not stop taking this medicine suddenly. This could lead to serious heart-related effects.  Contact your doctor or health care professional if you have difficulty breathing while taking this drug.  Check your weight daily. Ask your doctor or health care professional when you should notify him/her of any weight gain.  You may get drowsy or dizzy. Do not drive, use machinery, or do anything that requires mental alertness until you know how this medicine affects you. To reduce the risk of dizzy or fainting spells, do not sit or stand up quickly. Alcohol can make you more drowsy, and increase flushing and rapid heartbeats. Avoid alcoholic drinks.  If you have diabetes, check your blood sugar as directed. Tell your doctor if you have changes in your blood sugar while you are taking this medicine.  If you are going to have surgery, tell your doctor or health care professional that you are taking this medicine.  What side effects may I notice from receiving this medicine?  Side effects that you should report to your doctor or health care professional as soon as possible:    allergic reactions like skin rash, itching or hives, swelling of the face, lips, or tongue    breathing problems    dark urine    irregular heartbeat    swollen legs or ankles    vomiting    yellowing of the eyes or skin  Side effects that usually do not require medical attention (report to your doctor or health care professional if they continue or are bothersome):    change in sex drive or performance    diarrhea    dry eyes (especially if wearing contact lenses)    dry, itching skin    headache    nausea    unusually tired  This list may not describe all possible  side effects. Call your doctor for medical advice about side effects. You may report side effects to FDA at 2-007-OUL-1217.  Where should I keep my medicine?  Keep out of the reach of children.  Store at room temperature below 30 degrees C (86 degrees F). Protect from moisture. Keep container tightly closed. Throw away any unused medicine after the expiration date.  NOTE:This sheet is a summary. It may not cover all possible information. If you have questions about this medicine, talk to your doctor, pharmacist, or health care provider. Copyright  2016 [employer] Standard                Patient Education    Isosorbide Mononitrate Oral tablet    Isosorbide Mononitrate Oral tablet, extended-release  Isosorbide Mononitrate Oral tablet  What is this medicine?  ISOSORBIDE MONONITRATE (eye amol SOR bide mon oh NIELS trate) is a type of vasodilator. It relaxes blood vessels, increasing the blood and oxygen supply to your heart. This medicine is used to prevent chest pain caused by angina. It will not help to stop an episode of chest pain.  This medicine may be used for other purposes; ask your health care provider or pharmacist if you have questions.  What should I tell my health care provider before I take this medicine?  They need to know if you have any of these conditions:    previous heart attack or heart failure    an unusual or allergic reaction to isosorbide mononitrate, nitrates, other medicines, foods, dyes, or preservatives    pregnant or trying to get pregnant    breast-feeding  How should I use this medicine?  Take this medicine by mouth with a glass of water. Follow the directions on the prescription label. Take your medicine at regular intervals. Do not take your medicine more often than directed. Do not stop taking this medicine suddenly or your symptoms may get worse. Ask your doctor or health care professional how to gradually reduce the dose.  Talk to your pediatrician regarding the use of this medicine in  children. Special care may be needed.  Overdosage: If you think you have taken too much of this medicine contact a poison control center or emergency room at once.  NOTE: This medicine is only for you. Do not share this medicine with others.  What if I miss a dose?  If you miss a dose, take it as soon as you can. If it is almost time for your next dose, take only that dose. Do not take double or extra doses.  What may interact with this medicine?  Do not take this medicine with any of the following medications:    medicines used to treat erectile dysfunction (ED) like avanafil, sildenafil, tadalafil, and vardenafil    riociguat  This medicine may also interact with the following medications:    medicines for high blood pressure    other medicines for angina or heart failure  This list may not describe all possible interactions. Give your health care provider a list of all the medicines, herbs, non-prescription drugs, or dietary supplements you use. Also tell them if you smoke, drink alcohol, or use illegal drugs. Some items may interact with your medicine.  What should I watch for while using this medicine?  Check your heart rate and blood pressure regularly while you are taking this medicine. Ask your doctor or health care professional what your heart rate and blood pressure should be and when you should contact him or her. Tell your doctor or health care professional if you feel your medicine is no longer working.  You may get dizzy. Do not drive, use machinery, or do anything that needs mental alertness until you know how this medicine affects you. To reduce the risk of dizzy or fainting spells, do not sit or stand up quickly, especially if you are an older patient. Alcohol can make you more dizzy, and increase flushing and rapid heartbeats. Avoid alcoholic drinks.  Do not treat yourself for coughs, colds, or pain while you are taking this medicine without asking your doctor or health care professional for  advice. Some ingredients may increase your blood pressure.  What side effects may I notice from receiving this medicine?  Side effects that you should report to your doctor or health care professional as soon as possible:    bluish discoloration of lips, fingernails, or palms of hands    irregular heartbeat, palpitations    low blood pressure    nausea, vomiting    persistent headache    unusually weak or tired  Side effects that usually do not require medical attention (report to your doctor or health care professional if they continue or are bothersome):    flushing of the face or neck    rash  This list may not describe all possible side effects. Call your doctor for medical advice about side effects. You may report side effects to FDA at 6-298-AZX-9459.  Where should I keep my medicine?  Keep out of the reach of children.  Store between 15 and 30 degrees C (59 and 86 degrees F). Keep container tightly closed. Throw away any unused medicine after the expiration date.  NOTE:This sheet is a summary. It may not cover all possible information. If you have questions about this medicine, talk to your doctor, pharmacist, or health care provider. Copyright  2016 Gold Standard                Losartan Potassium Oral tablet  What is this medicine?  LOSARTAN (ervin BRIAN tan) is used to treat high blood pressure and to reduce the risk of stroke in certain patients. This drug also slows the progression of kidney disease in patients with diabetes.  This medicine may be used for other purposes; ask your health care provider or pharmacist if you have questions.  What should I tell my health care provider before I take this medicine?  They need to know if you have any of these conditions:    heart failure    kidney or liver disease    an unusual or allergic reaction to losartan, other medicines, foods, dyes, or preservatives    pregnant or trying to get pregnant    breast-feeding  How should I use this medicine?  Take this medicine  by mouth with a glass of water. Follow the directions on the prescription label. This medicine can be taken with or without food. Take your doses at regular intervals. Do not take your medicine more often than directed.  Talk to your pediatrician regarding the use of this medicine in children. Special care may be needed.  Overdosage: If you think you have taken too much of this medicine contact a poison control center or emergency room at once.  NOTE: This medicine is only for you. Do not share this medicine with others.  What if I miss a dose?  If you miss a dose, take it as soon as you can. If it is almost time for your next dose, take only that dose. Do not take double or extra doses.  What may interact with this medicine?    blood pressure medicines    diuretics, especially triamterene, spironolactone, or amiloride    fluconazole    NSAIDs, medicines for pain and inflammation, like ibuprofen or naproxen    potassium salts or potassium supplements    rifampin  This list may not describe all possible interactions. Give your health care provider a list of all the medicines, herbs, non-prescription drugs, or dietary supplements you use. Also tell them if you smoke, drink alcohol, or use illegal drugs. Some items may interact with your medicine.  What should I watch for while using this medicine?  Visit your doctor or health care professional for regular checks on your progress. Check your blood pressure as directed. Ask your doctor or health care professional what your blood pressure should be and when you should contact him or her. Call your doctor or health care professional if you notice an irregular or fast heart beat.  Women should inform their doctor if they wish to become pregnant or think they might be pregnant. There is a potential for serious side effects to an unborn child, particularly in the second or third trimester. Talk to your health care professional or pharmacist for more information.  You may get  drowsy or dizzy. Do not drive, use machinery, or do anything that needs mental alertness until you know how this drug affects you. Do not stand or sit up quickly, especially if you are an older patient. This reduces the risk of dizzy or fainting spells. Alcohol can make you more drowsy and dizzy. Avoid alcoholic drinks.  Avoid salt substitutes unless you are told otherwise by your doctor or health care professional.  Do not treat yourself for coughs, colds, or pain while you are taking this medicine without asking your doctor or health care professional for advice. Some ingredients may increase your blood pressure.  What side effects may I notice from receiving this medicine?  Side effects that you should report to your doctor or health care professional as soon as possible:    confusion, dizziness, light headedness or fainting spells    decreased amount of urine passed    difficulty breathing or swallowing, hoarseness, or tightening of the throat    fast or irregular heart beat, palpitations, or chest pain    skin rash, itching    swelling of your face, lips, tongue, hands, or feet  Side effects that usually do not require medical attention (report to your doctor or health care professional if they continue or are bothersome):    cough    decreased sexual function or desire    headache    nasal congestion or stuffiness    nausea or stomach pain    sore or cramping muscles  This list may not describe all possible side effects. Call your doctor for medical advice about side effects. You may report side effects to FDA at 6-527-FDA-8250.  Where should I keep my medicine?  Keep out of the reach of children.  Store at room temperature between 15 and 30 degrees C (59 and 86 degrees F). Protect from light. Keep container tightly closed. Throw away any unused medicine after the expiration date.  NOTE:This sheet is a summary. It may not cover all possible information. If you have questions about this medicine, talk to your  doctor, pharmacist, or health care provider. Copyright  2016 Gold Standard                Patient Education    Hydralazine Hydrochloride Oral tablet    Hydralazine Hydrochloride Solution for injection  Hydralazine Hydrochloride Oral tablet  What is this medicine?  HYDRALAZINE (norbert forrester) is a type of vasodilator. It relaxes blood vessels, increasing the blood and oxygen supply to your heart. This medicine is used to treat high blood pressure.  This medicine may be used for other purposes; ask your health care provider or pharmacist if you have questions.  What should I tell my health care provider before I take this medicine?  They need to know if you have any of these conditions:    blood vessel disease    heart disease including angina or history of heart attack    kidney or liver disease    systemic lupus erythematosus (SLE)    an unusual or allergic reaction to hydralazine, tartrazine dye, other medicines, foods, dyes, or preservatives    pregnant or trying to get pregnant    breast-feeding  How should I use this medicine?  Take this medicine by mouth with a glass of water. Follow the directions on the prescription label. Take your doses at regular intervals. Do not take your medicine more often than directed. Do not stop taking except on the advice of your doctor or health care professional.  Talk to your pediatrician regarding the use of this medicine in children. Special care may be needed. While this drug may be prescribed for children for selected conditions, precautions do apply.  Overdosage: If you think you have taken too much of this medicine contact a poison control center or emergency room at once.  NOTE: This medicine is only for you. Do not share this medicine with others.  What if I miss a dose?  If you miss a dose, take it as soon as you can. If it is almost time for your next dose, take only that dose. Do not take double or extra doses.  What may interact with this medicine?    medicines  for high blood pressure    medicines for mental depression  This list may not describe all possible interactions. Give your health care provider a list of all the medicines, herbs, non-prescription drugs, or dietary supplements you use. Also tell them if you smoke, drink alcohol, or use illegal drugs. Some items may interact with your medicine.  What should I watch for while using this medicine?  Visit your doctor or health care professional for regular checks on your progress. Check your blood pressure and pulse rate regularly. Ask your doctor or health care professional what your blood pressure and pulse rate should be and when you should contact him or her.  You may get drowsy or dizzy. Do not drive, use machinery, or do anything that needs mental alertness until you know how this medicine affects you. Do not stand or sit up quickly, especially if you are an older patient. This reduces the risk of dizzy or fainting spells. Alcohol may interfere with the effect of this medicine. Avoid alcoholic drinks.  Do not treat yourself for coughs, colds, or pain while you are taking this medicine without asking your doctor or health care professional for advice. Some ingredients may increase your blood pressure.  What side effects may I notice from receiving this medicine?  Side effects that you should report to your doctor or health care professional as soon as possible:    chest pain, or fast or irregular heartbeat    fever, chills, or sore throat    numbness or tingling in the hands or feet    shortness of breath    skin rash, redness, blisters or itching    stiff or swollen joints    sudden weight gain    swelling of the feet or legs    swollen lymph glands    unusual weakness  Side effects that usually do not require medical attention (report to your doctor or health care professional if they continue or are bothersome):    diarrhea, or constipation    headache    loss of appetite    nausea, vomiting  This list may not  describe all possible side effects. Call your doctor for medical advice about side effects. You may report side effects to FDA at 3-847-ESD-3027.  Where should I keep my medicine?  Keep out of the reach of children.  Store at room temperature between 15 and 30 degrees C (59 and 86 degrees F). Throw away any unused medicine after the expiration date.  NOTE:This sheet is a summary. It may not cover all possible information. If you have questions about this medicine, talk to your doctor, pharmacist, or health care provider. Copyright  2016 Gold Standard

## 2018-04-09 NOTE — LETTER
[address] [address]  Phone: [phone]    April 15, 2018        Palomo Patterson  [address] [facility] [address]        To whom it may concern:    RE: Palomo SEVERIANO Sears was admitted at the [facility] for health concerns from dates 04/09/18 to 04/15/18. Please excuse him from appointments during this time.     Please contact the hospital for any concerns     Sincerely,      Payton Johnson MD

## 2018-04-09 NOTE — IP AVS SNAPSHOT
[address] [address]    Phone:  [phone]                                       After Visit Summary   4/9/2018    Palomo Patterson    MRN: [id number]           After Visit Summary Signature Page     I have received my discharge instructions, and my questions have been answered. I have discussed any challenges I see with this plan with the nurse or doctor.    ..........................................................................................................................................  Patient/Patient Representative Signature      ..........................................................................................................................................  Patient Representative Print Name and Relationship to Patient    ..................................................               ................................................  Date                                            Time    ..........................................................................................................................................  Reviewed by Signature/Title    ...................................................              ..............................................  Date                                                            Time

## 2018-04-10 ENCOUNTER — APPOINTMENT (OUTPATIENT)
Dept: GENERAL RADIOLOGY | Facility: CLINIC | Age: 38
DRG: 286 | End: 2018-04-10
Attending: INTERNAL MEDICINE
Payer: COMMERCIAL

## 2018-04-10 ENCOUNTER — APPOINTMENT (OUTPATIENT)
Dept: OCCUPATIONAL THERAPY | Facility: CLINIC | Age: 38
DRG: 286 | End: 2018-04-10
Attending: INTERNAL MEDICINE
Payer: COMMERCIAL

## 2018-04-10 LAB
ANION GAP SERPL CALCULATED.3IONS-SCNC: 11 MMOL/L (ref 3–14)
BASE EXCESS BLDV CALC-SCNC: 1.4 MMOL/L
BASE EXCESS BLDV CALC-SCNC: 1.6 MMOL/L
BASE EXCESS BLDV CALC-SCNC: 3 MMOL/L
BASE EXCESS BLDV CALC-SCNC: 3.1 MMOL/L
BASE EXCESS BLDV CALC-SCNC: 4.8 MMOL/L
BUN SERPL-MCNC: 21 MG/DL (ref 7–30)
CALCIUM SERPL-MCNC: 8 MG/DL (ref 8.5–10.1)
CHLORIDE SERPL-SCNC: 105 MMOL/L (ref 94–109)
CHOLEST SERPL-MCNC: 129 MG/DL
CO2 SERPL-SCNC: 23 MMOL/L (ref 20–32)
CREAT SERPL-MCNC: 1.27 MG/DL (ref 0.66–1.25)
ERYTHROCYTE [DISTWIDTH] IN BLOOD BY AUTOMATED COUNT: 15.2 % (ref 10–15)
GFR SERPL CREATININE-BSD FRML MDRD: 64 ML/MIN/1.7M2
GLUCOSE SERPL-MCNC: 173 MG/DL (ref 70–99)
HCO3 BLDV-SCNC: 27 MMOL/L (ref 21–28)
HCO3 BLDV-SCNC: 28 MMOL/L (ref 21–28)
HCO3 BLDV-SCNC: 28 MMOL/L (ref 21–28)
HCO3 BLDV-SCNC: 29 MMOL/L (ref 21–28)
HCO3 BLDV-SCNC: 29 MMOL/L (ref 21–28)
HCT VFR BLD AUTO: 42.4 % (ref 40–53)
HDLC SERPL-MCNC: 34 MG/DL
HGB BLD-MCNC: 13.6 G/DL (ref 13.3–17.7)
INTERPRETATION ECG - MUSE: NORMAL
INTERPRETATION ECG - MUSE: NORMAL
LDLC SERPL CALC-MCNC: 74 MG/DL
MAGNESIUM SERPL-MCNC: 1.6 MG/DL (ref 1.6–2.3)
MAGNESIUM SERPL-MCNC: 1.8 MG/DL (ref 1.6–2.3)
MCH RBC QN AUTO: 27.6 PG (ref 26.5–33)
MCHC RBC AUTO-ENTMCNC: 32.1 G/DL (ref 31.5–36.5)
MCV RBC AUTO: 86 FL (ref 78–100)
NONHDLC SERPL-MCNC: 95 MG/DL
O2/TOTAL GAS SETTING VFR VENT: 21 %
O2/TOTAL GAS SETTING VFR VENT: ABNORMAL %
O2/TOTAL GAS SETTING VFR VENT: NORMAL %
OXYHGB MFR BLDV: 54 %
OXYHGB MFR BLDV: 63 %
OXYHGB MFR BLDV: 68 %
OXYHGB MFR BLDV: 72 %
OXYHGB MFR BLDV: 74 %
PCO2 BLDV: 42 MM HG (ref 40–50)
PCO2 BLDV: 44 MM HG (ref 40–50)
PCO2 BLDV: 45 MM HG (ref 40–50)
PCO2 BLDV: 47 MM HG (ref 40–50)
PCO2 BLDV: 49 MM HG (ref 40–50)
PH BLDV: 7.36 PH (ref 7.32–7.43)
PH BLDV: 7.39 PH (ref 7.32–7.43)
PH BLDV: 7.39 PH (ref 7.32–7.43)
PH BLDV: 7.42 PH (ref 7.32–7.43)
PH BLDV: 7.45 PH (ref 7.32–7.43)
PLATELET # BLD AUTO: 229 10E9/L (ref 150–450)
PO2 BLDV: 31 MM HG (ref 25–47)
PO2 BLDV: 37 MM HG (ref 25–47)
PO2 BLDV: 38 MM HG (ref 25–47)
PO2 BLDV: 41 MM HG (ref 25–47)
PO2 BLDV: 44 MM HG (ref 25–47)
POTASSIUM SERPL-SCNC: 3.6 MMOL/L (ref 3.4–5.3)
POTASSIUM SERPL-SCNC: 3.9 MMOL/L (ref 3.4–5.3)
RBC # BLD AUTO: 4.93 10E12/L (ref 4.4–5.9)
SODIUM SERPL-SCNC: 139 MMOL/L (ref 133–144)
TRIGL SERPL-MCNC: 108 MG/DL
WBC # BLD AUTO: 13.2 10E9/L (ref 4–11)

## 2018-04-10 PROCEDURE — 25000128 H RX IP 250 OP 636: Performed by: INTERNAL MEDICINE

## 2018-04-10 PROCEDURE — 85027 COMPLETE CBC AUTOMATED: CPT | Performed by: STUDENT IN AN ORGANIZED HEALTH CARE EDUCATION/TRAINING PROGRAM

## 2018-04-10 PROCEDURE — 83735 ASSAY OF MAGNESIUM: CPT | Performed by: INTERNAL MEDICINE

## 2018-04-10 PROCEDURE — 80061 LIPID PANEL: CPT | Performed by: INTERNAL MEDICINE

## 2018-04-10 PROCEDURE — 25000132 ZZH RX MED GY IP 250 OP 250 PS 637: Performed by: STUDENT IN AN ORGANIZED HEALTH CARE EDUCATION/TRAINING PROGRAM

## 2018-04-10 PROCEDURE — 20000004 ZZH R&B ICU UMMC

## 2018-04-10 PROCEDURE — 25000128 H RX IP 250 OP 636: Performed by: STUDENT IN AN ORGANIZED HEALTH CARE EDUCATION/TRAINING PROGRAM

## 2018-04-10 PROCEDURE — 82805 BLOOD GASES W/O2 SATURATION: CPT | Performed by: STUDENT IN AN ORGANIZED HEALTH CARE EDUCATION/TRAINING PROGRAM

## 2018-04-10 PROCEDURE — 84132 ASSAY OF SERUM POTASSIUM: CPT | Performed by: STUDENT IN AN ORGANIZED HEALTH CARE EDUCATION/TRAINING PROGRAM

## 2018-04-10 PROCEDURE — 40000133 ZZH STATISTIC OT WARD VISIT

## 2018-04-10 PROCEDURE — 25000132 ZZH RX MED GY IP 250 OP 250 PS 637: Performed by: INTERNAL MEDICINE

## 2018-04-10 PROCEDURE — 97535 SELF CARE MNGMENT TRAINING: CPT | Mod: GO

## 2018-04-10 PROCEDURE — 99233 SBSQ HOSP IP/OBS HIGH 50: CPT | Mod: GC | Performed by: INTERNAL MEDICINE

## 2018-04-10 PROCEDURE — 40000014 ZZH STATISTIC ARTERIAL MONITORING DAILY

## 2018-04-10 PROCEDURE — 97165 OT EVAL LOW COMPLEX 30 MIN: CPT | Mod: GO

## 2018-04-10 PROCEDURE — 80048 BASIC METABOLIC PNL TOTAL CA: CPT | Performed by: INTERNAL MEDICINE

## 2018-04-10 PROCEDURE — 71045 X-RAY EXAM CHEST 1 VIEW: CPT

## 2018-04-10 PROCEDURE — 40000048 ZZH STATISTIC DAILY SWAN MONITORING

## 2018-04-10 PROCEDURE — 40000196 ZZH STATISTIC RAPCV CVP MONITORING

## 2018-04-10 PROCEDURE — 83735 ASSAY OF MAGNESIUM: CPT | Performed by: STUDENT IN AN ORGANIZED HEALTH CARE EDUCATION/TRAINING PROGRAM

## 2018-04-10 RX ORDER — LISINOPRIL 5 MG/1
5 TABLET ORAL 2 TIMES DAILY
Status: DISCONTINUED | OUTPATIENT
Start: 2018-04-10 | End: 2018-04-10

## 2018-04-10 RX ORDER — POTASSIUM CL/LIDO/0.9 % NACL 10MEQ/0.1L
10 INTRAVENOUS SOLUTION, PIGGYBACK (ML) INTRAVENOUS
Status: DISCONTINUED | OUTPATIENT
Start: 2018-04-10 | End: 2018-04-15 | Stop reason: HOSPADM

## 2018-04-10 RX ORDER — LOSARTAN POTASSIUM 25 MG/1
25 TABLET ORAL DAILY
Status: DISCONTINUED | OUTPATIENT
Start: 2018-04-10 | End: 2018-04-11

## 2018-04-10 RX ORDER — HYDRALAZINE HYDROCHLORIDE 25 MG/1
25 TABLET, FILM COATED ORAL 4 TIMES DAILY
Status: DISCONTINUED | OUTPATIENT
Start: 2018-04-10 | End: 2018-04-11

## 2018-04-10 RX ORDER — MAGNESIUM SULFATE HEPTAHYDRATE 40 MG/ML
4 INJECTION, SOLUTION INTRAVENOUS EVERY 4 HOURS PRN
Status: DISCONTINUED | OUTPATIENT
Start: 2018-04-10 | End: 2018-04-15 | Stop reason: HOSPADM

## 2018-04-10 RX ORDER — POTASSIUM CHLORIDE 7.45 MG/ML
10 INJECTION INTRAVENOUS
Status: DISCONTINUED | OUTPATIENT
Start: 2018-04-10 | End: 2018-04-15 | Stop reason: HOSPADM

## 2018-04-10 RX ORDER — ACETAMINOPHEN 325 MG/1
650 TABLET ORAL EVERY 4 HOURS PRN
Status: DISCONTINUED | OUTPATIENT
Start: 2018-04-10 | End: 2018-04-15 | Stop reason: HOSPADM

## 2018-04-10 RX ORDER — POTASSIUM CHLORIDE 1.5 G/1.58G
20-40 POWDER, FOR SOLUTION ORAL
Status: DISCONTINUED | OUTPATIENT
Start: 2018-04-10 | End: 2018-04-15 | Stop reason: HOSPADM

## 2018-04-10 RX ORDER — POTASSIUM CHLORIDE 750 MG/1
20-40 TABLET, EXTENDED RELEASE ORAL
Status: DISCONTINUED | OUTPATIENT
Start: 2018-04-10 | End: 2018-04-15 | Stop reason: HOSPADM

## 2018-04-10 RX ORDER — POTASSIUM CHLORIDE 29.8 MG/ML
20 INJECTION INTRAVENOUS
Status: DISCONTINUED | OUTPATIENT
Start: 2018-04-10 | End: 2018-04-15 | Stop reason: HOSPADM

## 2018-04-10 RX ADMIN — HYDRALAZINE HYDROCHLORIDE 25 MG: 25 TABLET ORAL at 13:37

## 2018-04-10 RX ADMIN — SODIUM THIOSULFATE 3 MCG/KG/MIN: 250 INJECTION, SOLUTION INTRAVENOUS at 20:25

## 2018-04-10 RX ADMIN — HYDRALAZINE HYDROCHLORIDE 25 MG: 25 TABLET ORAL at 16:18

## 2018-04-10 RX ADMIN — HEPARIN SODIUM 5000 UNITS: 5000 INJECTION, SOLUTION INTRAVENOUS; SUBCUTANEOUS at 10:11

## 2018-04-10 RX ADMIN — SODIUM THIOSULFATE 4 MCG/KG/MIN: 250 INJECTION, SOLUTION INTRAVENOUS at 13:46

## 2018-04-10 RX ADMIN — LOSARTAN POTASSIUM 25 MG: 25 TABLET ORAL at 12:22

## 2018-04-10 RX ADMIN — ACETAMINOPHEN 650 MG: 325 TABLET, FILM COATED ORAL at 15:05

## 2018-04-10 RX ADMIN — SODIUM THIOSULFATE 3 MCG/KG/MIN: 250 INJECTION, SOLUTION INTRAVENOUS at 10:36

## 2018-04-10 RX ADMIN — ACETAMINOPHEN 650 MG: 325 TABLET, FILM COATED ORAL at 08:10

## 2018-04-10 RX ADMIN — ACETAMINOPHEN 650 MG: 325 TABLET, FILM COATED ORAL at 20:55

## 2018-04-10 RX ADMIN — HYDRALAZINE HYDROCHLORIDE 25 MG: 25 TABLET ORAL at 20:55

## 2018-04-10 RX ADMIN — HEPARIN SODIUM 5000 UNITS: 5000 INJECTION, SOLUTION INTRAVENOUS; SUBCUTANEOUS at 01:10

## 2018-04-10 RX ADMIN — SODIUM THIOSULFATE 3 MCG/KG/MIN: 250 INJECTION, SOLUTION INTRAVENOUS at 16:58

## 2018-04-10 RX ADMIN — HEPARIN SODIUM 5000 UNITS: 5000 INJECTION, SOLUTION INTRAVENOUS; SUBCUTANEOUS at 17:35

## 2018-04-10 ASSESSMENT — PAIN DESCRIPTION - DESCRIPTORS
DESCRIPTORS: ACHING
DESCRIPTORS: ACHING

## 2018-04-10 NOTE — PROCEDURES
"Procedure/Surgery Information   [facility], [place]    Bedside Procedure Note  Date of Service (when I performed the procedure): 04/09/2018    Palomo Patterson is a 37 year old male patient.  1. Substance abuse    2. Acute on chronic combined systolic and diastolic congestive heart failure (H)      Past Medical History:   Diagnosis Date     Hypertension      Temp: 97.9  F (36.6  C) Temp src: Oral BP: (!) 143/98 Pulse: 119 Heart Rate: 116 Resp: 28 SpO2: 99 % O2 Device: Nasal cannula Oxygen Delivery: 2 LPM    Insert arterial line  Date/Time: 4/9/2018 8:00 PM  Performed by: MORA MAYORGA  Authorized by: MORA MAYORGA   Consent: Verbal consent obtained. Written consent obtained.  Risks and benefits: risks, benefits and alternatives were discussed  Consent given by: patient  Patient identity confirmed: verbally with patient and arm band  Time out: Immediately prior to procedure a \"time out\" was called to verify the correct patient, procedure, equipment, support staff and site/side marked as required.  Preparation: Patient was prepped and draped in the usual sterile fashion.  Indications: hemodynamic monitoring  Location: left radial  Anesthesia: local infiltration    Sedation:  Patient sedated: no  Pérez's test normal: yes  Needle gauge: 18  Seldinger technique: Seldinger technique used  Number of attempts: 2  Post-procedure: line sutured and dressing applied  Post-procedure CMS: normal  Patient tolerance: Patient tolerated the procedure well with no immediate complications           Mora Mayorga  "

## 2018-04-10 NOTE — PROGRESS NOTES
Cardiology Progress Note  Palomo Patterson MRN: [id number]  Age: 37 year old, : 1980  04/10/18            Changes Today:     - Start Losartan for additional afterload reduction  - No further diuretics at this time     Dispo: 2-3 days pending afterload reduction, overall optimization         Assessment and Plan:     37 year old yo with past medical history significant for active nicotine use, methamphetamine use who presents with acute on chronic HFrEF decompensation most likely secondary to methamphtamine use.     Acute on chronic HFrEF exacerbation   Likely secondary to methamphetamine use however also possible mild component of family history contributing. Last methamphetamine use on 18. On admission underwent coronary angiogram and HD measurements showing CI - 1.5, EF - 5-10%,   Home regimen:   Hold PTA coreg 3.125BID, losartan-HCTZ 50/12.5, and amlodipine  Hold PTA Lasix 20mg BID.   - Volume: Hold all diuretics at this time until BP better controlled; will reassess with wedge in 1-2 days   - Afterload: Nipride gtt, ARB Losartan 25mg Qd (cough to ACE)   - BB: Contraindicated secondary to meth use.   - Hemodynamics Q4h   - Biopsy results pending     RENUKA  Baseline Cr 1.1-1.2 in 2017. Now Cr ~2015. Most likely cardiorenal.   - Continue to monitor     ACCESS: PIVx1  FEN: No fluids  On Electrolyte protocol  2L fluid restriction  PPX: Heparin   CODE: Full      Patient was discussed with staff attending, Dr. Garcia.    Payton Johnson  PGY-1 Internal Medicine  Cardiology Service          Subjective     Patient feeling well this AM. No cp, sob. Accompanied by sister           Objective     Vitals:  Temp:  [97.5  F (36.4  C)-98.5  F (36.9  C)] 97.8  F (36.6  C)  Pulse:  [119] 119  Heart Rate:  [] 122  Resp:  [14-42] 26  BP: (130-167)/() 156/106  Arterial Line BP: (5-195)/(4-125) 171/125  SpO2:  [94 %-100 %] 96 %  MAP: 133-117    Tele: Sinus rhythm    I/O:  Intake/Output Summary (Last 24 hours) at 04/10/18 0838  Last data filed at 04/10/18 0800   Gross per 24 hour   Intake          1611.39 ml   Output             3125 ml   Net         -1513.61 ml     [place]: SVo2 54, SAo2 98, CVP 14, PA 75/49 (55), CO 2.9 (CI-1.6), SVR 3239.1,   Pulm Cap Wedge ~25     Vitals:    04/09/18 1256 04/10/18 0600   Weight: 74.4 kg (164 lb) 76.4 kg (168 lb 6.9 oz)       Gen: AA&Ox3, no acute distress, able to answer questions appropriately. NO ams  HEENT:AT/ NC, PERRL b/l, EOM grossly intact, mucous membranes pink, moist without plaque or exudate, nc on face with end tidal co2 monitor.   PULM/THORAX: Clear to auscultation bilaterally, no rales/stridor/wheezes  CV:RRR, S1 and S2 appreciated, no extra heart sounds, murmurs or rub auscultated. No JVD  ABD: Soft, nt nd, bs +    EXT:  No edema, clubbing or cyanosis. Cold bl upper extremities, no asymmetric edema.             Data:      Labs reviewed. Pertinent for : Cr 1.27, K - 3.6, Mg - 1.8,   Gas reviewed in chart.   Imaging reviewed.          Medications     Medications    sodium chloride (PF)  3 mL Intracatheter Q8H     heparin  5,000 Units Subcutaneous Q8H     influenza quadrivalent (PF) vacc age 3 yrs and older  0.5 mL Intramuscular Prior to discharge       Reason beta blocker order not selected       nitroPRUsside (NIPRIDE) IV infusion ADULT/PEDS GREATER than or EQUAL to 45 kg std conc 1.5 mcg/kg/min (04/10/18 0800)                 I have reviewed today's vital signs, notes, medications, labs and imaging.  I have also seen and examined the patient and agree with the findings and plan as outlined above.  Pt with sister at bedside.  VSS with CVP 11, CI 1.9 and SVR 2665 on nipride gtt and iv diuretics.  Pt alert and oriented.  Lungs clear and S1 and S2 with loud S3.  Labs with Cr 1.27.  Assessment: Pt with polysubstance abuse with last meth use 3 days ago now with endstage heart failure requriing iv diuresis and extensive afterload  reduction with nipride, losartan and hydralazine.  Had extensive discusion with sister and patient regarding need to abstain from meth.  Total critical care time 25 min.     Tony Garcia MD, PhD  Professor, Heart Failure and Cardiac Transplantation  [facility]

## 2018-04-10 NOTE — PLAN OF CARE
Problem: Cardiac: Heart Failure (Adult)  Goal: Signs and Symptoms of Listed Potential Problems Will be Absent, Minimized or Managed (Cardiac: Heart Failure)  Signs and symptoms of listed potential problems will be absent, minimized or managed by discharge/transition of care (reference Cardiac: Heart Failure (Adult) CPG).  Outcome: No Change  Patient arrived to 4C around 1730. Nipride started at 0.25, swan transducing, ACT checked- at 111- 4E RN to help pull right arterial and femoral sheath at start of next shift. CVP checked quick at 18, PAP checked at 74/30, ficks Q4H starting at 2000 and titrating nipride for a CI> 1.8 ST to 120, BP's hypertensive at 160/120. +2 pulses, afebrile. Resp- 2LPM LS dim in bases. GI/- tolerating 2g Na diet, 2L FR- at 590 mL's for the day. Due to void. BM this am per pt PTA. No skin concerns.   Plan: titrate nipride to CI, medically manage HF, HD's Q4H.

## 2018-04-10 NOTE — PLAN OF CARE
Problem: Patient Care Overview  Goal: Plan of Care/Patient Progress Review  Outcome: No Change  D:37 year old [place]  male with a history of biventricular acute on chronic congestive heart failure, recurrent methamphetamine use, family history of cardiomyopathy in mother, and cigarette use who presents for further management of biventricular heart failure    A/I: Respiratory 2L NC, coarse/crackles lung sounds, worsening cough, IS education done  Cardiovascular: swan in place, jed place- BP elevated MD notified, sheath removed- site WDL  Hematology- no signs of bleeding  Renal- adequate UOP- diuresed with lasix  GI/Endocrine- LBM 4/9  Neurological- oriented x 4 but lethargic, c/o head ache  Integumentary- dry but intact- independent positioning in bed    P: Continue to Monitor and Assess patient. Notify MD of any changes. Closely monitor PA pressures and develop POC

## 2018-04-10 NOTE — PROGRESS NOTES
" 04/10/18 1348   Quick Adds   Type of Visit Initial Occupational Therapy Evaluation   Living Environment   Lives With other (see comments)  (Pt reports living \"with others\" but does not specify who.)   Living Arrangements house  (split entry)   Home Accessibility stairs within home;stairs to enter home   Number of Stairs to Enter Home 2   Number of Stairs Within Home 16  (split entry)   Living Environment Comment Pt reports living in a house with \"others\".    Self-Care   Dominant Hand left   Usual Activity Tolerance fair   Current Activity Tolerance poor   Regular Exercise no   Equipment Currently Used at Home none   Activity/Exercise/Self-Care Comment Pt reports decreased activity at home recently due to fatigue/SOB.   Functional Level Prior   Ambulation 0-->independent   Transferring 0-->independent   Toileting 0-->independent   Bathing 0-->independent   Dressing 0-->independent   Eating 0-->independent   Communication 0-->understands/communicates without difficulty   Swallowing 0-->swallows foods/liquids without difficulty   Cognition 0 - no cognition issues reported   Fall history within last six months no   Which of the above functional risks had a recent onset or change? ambulation;transferring;toileting;bathing;dressing   Prior Functional Level Comment Pt reports IND with ADL at baseline.       Present no   General Information   Onset of Illness/Injury or Date of Surgery - Date 04/09/18   Referring Physician Betty Vuong MD   Patient/Family Goals Statement Return home   Additional Occupational Profile Info/Pertinent History of Current Problem 37 year old yo with past medical history significant for active nicotine use, methamphetamine use who presents with acute on chronic HFrEF decompensation most likely secondary to methamphtamine use.    Precautions/Limitations fall precautions;oxygen therapy device and L/min;other (see comments)  (PA catheter)   General Observations Pt supine " in bed upon arrival, agreeable to therapy.   General Info Comments Activity: up ad srini   Cognitive Status Examination   Orientation orientation to person, place and time   Level of Consciousness alert   Able to Follow Commands WNL/WFL   Pain Assessment   Patient Currently in Pain No   Integumentary/Edema   Integumentary/Edema no deficits were identifed   Range of Motion (ROM)   ROM Quick Adds No deficits were identified   Strength   Strength Comments generalized weakness   Transfer Skill: Sit to Stand   Level of [place]: Sit/Stand stand-by assist   Instrumental Activities of Daily Living (IADL)   Previous Responsibilities driving;meal prep;housekeeping;laundry;medication management   IADL Comments Pt receives assist from family for majoirty of IADLs. Pt intermittently assists with cooking, cleaning, laundry.   Activities of Daily Living Analysis   Impairments Contributing to Impaired Activities of Daily Living balance impaired;strength decreased   General Therapy Interventions   Planned Therapy Interventions ADL retraining;IADL retraining;cognition;bed mobility training;ROM;strengthening;home program guidelines;transfer training;progressive activity/exercise;risk factor education   Clinical Impression   Criteria for Skilled Therapeutic Interventions Met yes, treatment indicated   OT Diagnosis CR order for CHF   Influenced by the following impairments activity tolerance, strength, fatigue   Assessment of Occupational Performance 3-5 Performance Deficits   Identified Performance Deficits home mgmt, bathing, toileting   Clinical Decision Making (Complexity) Low complexity   Therapy Frequency daily   Predicted Duration of Therapy Intervention (days/wks) 2 weeks   Anticipated Equipment Needs at Discharge shower chair  (TBD with further therapy)   Anticipated Discharge Disposition Home with Assist;Home with Home Therapy   Risks and Benefits of Treatment have been explained. Yes   Patient, Family & other staff in  "agreement with plan of care Yes   [facility] TM \"6 Clicks\"   2016, Trustees of [facility], under license to [employer].  All rights reserved.   6 Clicks Short Forms Daily Activity Inpatient Short Form   [facility]  \"6 Clicks\" Daily Activity Inpatient Short Form   1. Putting on and taking off regular lower body clothing? 3 - A Little   2. Bathing (including washing, rinsing, drying)? 2 - A Lot   3. Toileting, which includes using toilet, bedpan or urinal? 2 - A Lot   4. Putting on and taking off regular upper body clothing? 2 - A Lot   5. Taking care of personal grooming such as brushing teeth? 3 - A Little   6. Eating meals? 2 - A Lot   Daily Activity Raw Score (Score out of 24.Lower scores equate to lower levels of function) 14   Total Evaluation Time   Total Evaluation Time (Minutes) 8     "

## 2018-04-10 NOTE — PLAN OF CARE
Problem: Patient Care Overview  Goal: Plan of Care/Patient Progress Review  OT/4C - Discharge Planner OT   Patient plan for discharge: home  Current status:  SBA for bed mobility and transfer into bedside recliner. Facilitated seated ADL with set up assist only for oral cares.  Discussed fatigue as major limitation for ADL at home, introduced shower chair as EC/WS technique and encouraged pt to implement.  bpm, spO2 95% on RA, difficult to get accurate BP read (a-line reads MAP of 118; unable to verify with cuff pressure).   Barriers to return to prior living situation: activity tolerance, strength, fatigue  Recommendations for discharge: home with assist and home therapy pending continued progress during inpatient stay (pt lives in a split level home and will need to demonstrate safety with stairs prior to discharge)  Rationale for recommendations: To increase activity tolerance and safety with ADL. Pt would benefit from home OT for further implementation of energy conservation techniques.        Entered by: Caity Galdamez 04/10/2018 2:57 PM

## 2018-04-10 NOTE — PROGRESS NOTES
D/I/A: .  Pulled R groin sheaths with manual pressure for 15 min.  Pt with back pain-dilaudid given by bedside RN.  Pt instructed as to plan of care.  Tolerated pull.  Right groin soft, without hematoma.  CMS intact.    P:  Bedrest per primary.  Continue to monitor.

## 2018-04-10 NOTE — PLAN OF CARE
Problem: Patient Care Overview  Goal: Plan of Care/Patient Progress Review  Outcome: No Change  [id number]:  NEURO: A&ox4. No neuro deficits. Has been sleeping most of day. Was up in chair for only 1 hour despite encouragement. Flat and withdrawn, but pleasant. Tylenol for headache.   CV: has been hypertensive all day. titrating up nipride t/o the day to maintain MAP < 90 and CI > 2. A. Line very positional. Co 3.9 CI 2.4 at 1200.  Started on po anti-hypertensives today.   RESP: on RA when awake.. 2 liters when sleeping. Denies SOB.   GI: Low sodium diet, 2L fluid restriction. Good appetite.   : voiding in urinal. No lasix given today.   Gtts:  Nipride. TKO.   SKIN: No active issues. Right groin site without hematoma.   LABS: DORA calculations changed to BID instead of Q 4 hours.   PLAN: Wean off nipride as able, update sister and girlfriend who have been at bedside all day.   See flow sheets for further interventions and assessments.  Continue to monitor pt closely. Notify MD of changes.

## 2018-04-11 ENCOUNTER — APPOINTMENT (OUTPATIENT)
Dept: GENERAL RADIOLOGY | Facility: CLINIC | Age: 38
DRG: 286 | End: 2018-04-11
Attending: INTERNAL MEDICINE
Payer: COMMERCIAL

## 2018-04-11 ENCOUNTER — APPOINTMENT (OUTPATIENT)
Dept: OCCUPATIONAL THERAPY | Facility: CLINIC | Age: 38
DRG: 286 | End: 2018-04-11
Attending: INTERNAL MEDICINE
Payer: COMMERCIAL

## 2018-04-11 LAB
ANION GAP SERPL CALCULATED.3IONS-SCNC: 9 MMOL/L (ref 3–14)
BACTERIA SPEC CULT: NORMAL
BASE DEFICIT BLDV-SCNC: 0.2 MMOL/L
BASE EXCESS BLDV CALC-SCNC: 1.8 MMOL/L
BUN SERPL-MCNC: 16 MG/DL (ref 7–30)
CALCIUM SERPL-MCNC: 8.4 MG/DL (ref 8.5–10.1)
CHLORIDE SERPL-SCNC: 106 MMOL/L (ref 94–109)
CO2 SERPL-SCNC: 25 MMOL/L (ref 20–32)
CREAT SERPL-MCNC: 1.08 MG/DL (ref 0.66–1.25)
ERYTHROCYTE [DISTWIDTH] IN BLOOD BY AUTOMATED COUNT: 15 % (ref 10–15)
GFR SERPL CREATININE-BSD FRML MDRD: 77 ML/MIN/1.7M2
GLUCOSE SERPL-MCNC: 121 MG/DL (ref 70–99)
HCO3 BLDV-SCNC: 25 MMOL/L (ref 21–28)
HCO3 BLDV-SCNC: 27 MMOL/L (ref 21–28)
HCT VFR BLD AUTO: 40.5 % (ref 40–53)
HGB BLD-MCNC: 13 G/DL (ref 13.3–17.7)
Lab: NORMAL
MAGNESIUM SERPL-MCNC: 3.6 MG/DL (ref 1.6–2.3)
MCH RBC QN AUTO: 27.5 PG (ref 26.5–33)
MCHC RBC AUTO-ENTMCNC: 32.1 G/DL (ref 31.5–36.5)
MCV RBC AUTO: 86 FL (ref 78–100)
O2/TOTAL GAS SETTING VFR VENT: 21 %
O2/TOTAL GAS SETTING VFR VENT: 21 %
OXYHGB MFR BLDV: 68 %
OXYHGB MFR BLDV: 74 %
PCO2 BLDV: 42 MM HG (ref 40–50)
PCO2 BLDV: 44 MM HG (ref 40–50)
PH BLDV: 7.38 PH (ref 7.32–7.43)
PH BLDV: 7.4 PH (ref 7.32–7.43)
PLATELET # BLD AUTO: 196 10E9/L (ref 150–450)
PO2 BLDV: 40 MM HG (ref 25–47)
PO2 BLDV: 44 MM HG (ref 25–47)
POTASSIUM SERPL-SCNC: 4.3 MMOL/L (ref 3.4–5.3)
RBC # BLD AUTO: 4.73 10E12/L (ref 4.4–5.9)
SODIUM SERPL-SCNC: 140 MMOL/L (ref 133–144)
SPECIMEN SOURCE: NORMAL
WBC # BLD AUTO: 10.7 10E9/L (ref 4–11)

## 2018-04-11 PROCEDURE — 25000128 H RX IP 250 OP 636

## 2018-04-11 PROCEDURE — 99233 SBSQ HOSP IP/OBS HIGH 50: CPT | Mod: GC | Performed by: INTERNAL MEDICINE

## 2018-04-11 PROCEDURE — 97535 SELF CARE MNGMENT TRAINING: CPT | Mod: GO | Performed by: OCCUPATIONAL THERAPIST

## 2018-04-11 PROCEDURE — 25000128 H RX IP 250 OP 636: Performed by: STUDENT IN AN ORGANIZED HEALTH CARE EDUCATION/TRAINING PROGRAM

## 2018-04-11 PROCEDURE — 82805 BLOOD GASES W/O2 SATURATION: CPT | Performed by: STUDENT IN AN ORGANIZED HEALTH CARE EDUCATION/TRAINING PROGRAM

## 2018-04-11 PROCEDURE — 25000132 ZZH RX MED GY IP 250 OP 250 PS 637: Performed by: STUDENT IN AN ORGANIZED HEALTH CARE EDUCATION/TRAINING PROGRAM

## 2018-04-11 PROCEDURE — 85027 COMPLETE CBC AUTOMATED: CPT | Performed by: INTERNAL MEDICINE

## 2018-04-11 PROCEDURE — 20000004 ZZH R&B ICU UMMC

## 2018-04-11 PROCEDURE — 25000128 H RX IP 250 OP 636: Performed by: INTERNAL MEDICINE

## 2018-04-11 PROCEDURE — 40000133 ZZH STATISTIC OT WARD VISIT: Performed by: OCCUPATIONAL THERAPIST

## 2018-04-11 PROCEDURE — 80048 BASIC METABOLIC PNL TOTAL CA: CPT | Performed by: INTERNAL MEDICINE

## 2018-04-11 PROCEDURE — 25000132 ZZH RX MED GY IP 250 OP 250 PS 637: Performed by: INTERNAL MEDICINE

## 2018-04-11 PROCEDURE — 83735 ASSAY OF MAGNESIUM: CPT | Performed by: INTERNAL MEDICINE

## 2018-04-11 PROCEDURE — 71045 X-RAY EXAM CHEST 1 VIEW: CPT

## 2018-04-11 RX ORDER — HYDRALAZINE HYDROCHLORIDE 25 MG/1
75 TABLET, FILM COATED ORAL 4 TIMES DAILY
Status: DISCONTINUED | OUTPATIENT
Start: 2018-04-11 | End: 2018-04-11

## 2018-04-11 RX ORDER — LOSARTAN POTASSIUM 25 MG/1
25 TABLET ORAL EVERY EVENING
Status: DISCONTINUED | OUTPATIENT
Start: 2018-04-11 | End: 2018-04-12

## 2018-04-11 RX ORDER — CARVEDILOL 3.12 MG/1
3.12 TABLET ORAL 2 TIMES DAILY WITH MEALS
Status: DISCONTINUED | OUTPATIENT
Start: 2018-04-11 | End: 2018-04-12

## 2018-04-11 RX ORDER — SODIUM CHLORIDE 9 MG/ML
INJECTION, SOLUTION INTRAVENOUS
Status: COMPLETED
Start: 2018-04-11 | End: 2018-04-11

## 2018-04-11 RX ORDER — HYDRALAZINE HYDROCHLORIDE 25 MG/1
75 TABLET, FILM COATED ORAL 3 TIMES DAILY
Status: DISCONTINUED | OUTPATIENT
Start: 2018-04-11 | End: 2018-04-12

## 2018-04-11 RX ORDER — HYDRALAZINE HYDROCHLORIDE 25 MG/1
50 TABLET, FILM COATED ORAL 4 TIMES DAILY
Status: DISCONTINUED | OUTPATIENT
Start: 2018-04-11 | End: 2018-04-11

## 2018-04-11 RX ORDER — LOSARTAN POTASSIUM 25 MG/1
25 TABLET ORAL AT BEDTIME
Status: DISCONTINUED | OUTPATIENT
Start: 2018-04-11 | End: 2018-04-11

## 2018-04-11 RX ORDER — ISOSORBIDE DINITRATE 10 MG/1
10 TABLET ORAL 3 TIMES DAILY
Status: DISCONTINUED | OUTPATIENT
Start: 2018-04-11 | End: 2018-04-12

## 2018-04-11 RX ORDER — LOSARTAN POTASSIUM 50 MG/1
50 TABLET ORAL DAILY
Status: DISCONTINUED | OUTPATIENT
Start: 2018-04-11 | End: 2018-04-15 | Stop reason: HOSPADM

## 2018-04-11 RX ADMIN — SODIUM THIOSULFATE 3.5 MCG/KG/MIN: 250 INJECTION, SOLUTION INTRAVENOUS at 00:19

## 2018-04-11 RX ADMIN — ISOSORBIDE DINITRATE 10 MG: 10 TABLET ORAL at 20:31

## 2018-04-11 RX ADMIN — SODIUM THIOSULFATE 5 MCG/KG/MIN: 250 INJECTION, SOLUTION INTRAVENOUS at 11:39

## 2018-04-11 RX ADMIN — HYDRALAZINE HYDROCHLORIDE 25 MG: 25 TABLET ORAL at 09:04

## 2018-04-11 RX ADMIN — SODIUM THIOSULFATE 3.5 MCG/KG/MIN: 250 INJECTION, SOLUTION INTRAVENOUS at 03:45

## 2018-04-11 RX ADMIN — SODIUM THIOSULFATE 2.25 MCG/KG/MIN: 250 INJECTION, SOLUTION INTRAVENOUS at 23:42

## 2018-04-11 RX ADMIN — BENZOCAINE, MENTHOL 1 LOZENGE: 15; 3.6 LOZENGE ORAL at 16:17

## 2018-04-11 RX ADMIN — ISOSORBIDE DINITRATE 10 MG: 10 TABLET ORAL at 15:54

## 2018-04-11 RX ADMIN — CARVEDILOL 3.12 MG: 3.12 TABLET, FILM COATED ORAL at 18:08

## 2018-04-11 RX ADMIN — HEPARIN SODIUM 5000 UNITS: 5000 INJECTION, SOLUTION INTRAVENOUS; SUBCUTANEOUS at 00:46

## 2018-04-11 RX ADMIN — HYDRALAZINE HYDROCHLORIDE 75 MG: 25 TABLET ORAL at 20:31

## 2018-04-11 RX ADMIN — MAGNESIUM SULFATE IN WATER 4 G: 40 INJECTION, SOLUTION INTRAVENOUS at 00:46

## 2018-04-11 RX ADMIN — SODIUM CHLORIDE 3 ML: 9 INJECTION, SOLUTION INTRAVENOUS at 00:45

## 2018-04-11 RX ADMIN — HYDRALAZINE HYDROCHLORIDE 50 MG: 25 TABLET ORAL at 11:36

## 2018-04-11 RX ADMIN — CARVEDILOL 3.12 MG: 3.12 TABLET, FILM COATED ORAL at 11:00

## 2018-04-11 RX ADMIN — SODIUM THIOSULFATE 4 MCG/KG/MIN: 250 INJECTION, SOLUTION INTRAVENOUS at 10:01

## 2018-04-11 RX ADMIN — HYDRALAZINE HYDROCHLORIDE 75 MG: 25 TABLET ORAL at 15:54

## 2018-04-11 RX ADMIN — SODIUM THIOSULFATE 3.5 MCG/KG/MIN: 250 INJECTION, SOLUTION INTRAVENOUS at 07:19

## 2018-04-11 RX ADMIN — HEPARIN SODIUM 5000 UNITS: 5000 INJECTION, SOLUTION INTRAVENOUS; SUBCUTANEOUS at 09:04

## 2018-04-11 RX ADMIN — ACETAMINOPHEN 650 MG: 325 TABLET, FILM COATED ORAL at 15:59

## 2018-04-11 RX ADMIN — LOSARTAN POTASSIUM 25 MG: 25 TABLET ORAL at 20:31

## 2018-04-11 RX ADMIN — SODIUM THIOSULFATE 5 MCG/KG/MIN: 250 INJECTION, SOLUTION INTRAVENOUS at 13:57

## 2018-04-11 RX ADMIN — ACETAMINOPHEN 650 MG: 325 TABLET, FILM COATED ORAL at 23:48

## 2018-04-11 RX ADMIN — BENZOCAINE, MENTHOL 1 LOZENGE: 15; 3.6 LOZENGE ORAL at 09:59

## 2018-04-11 RX ADMIN — SODIUM THIOSULFATE 2 MCG/KG/MIN: 250 INJECTION, SOLUTION INTRAVENOUS at 17:57

## 2018-04-11 RX ADMIN — LOSARTAN POTASSIUM 50 MG: 50 TABLET ORAL at 09:04

## 2018-04-11 NOTE — PLAN OF CARE
Problem: Patient Care Overview  Goal: Plan of Care/Patient Progress Review  OT/4C:  Discharge Planner OT   Patient plan for discharge: home   Current status: Pt SBA for tranfers and ADLs. Educated on need to progress activity and sleep/wake cycle.   Barriers to return to prior living situation: medical status   Recommendations for discharge: home with assist and home therapy eval   Rationale for recommendations: Pt will require assist for heavy household chores due to decreased activity tolerance       Entered by: Indigo Freeman 04/11/2018 11:54 AM

## 2018-04-11 NOTE — PROGRESS NOTES
CLINICAL NUTRITION SERVICES - BRIEF NOTE    Received provider consult for nutrition education for CHF - 2 g sodium diet. RD attempted x 3 to provide education, pt busy w/ nursing cares or sleeping each attempt. Left handout at bedside: Low-Sodium Foods and Drinks, Tips for a Low-Sodium Diet, Seasoning Your Foods Without Adding Salt. Verbal nutrition education to be completed as able/appropriate.    Marivel Fang RD, LD, [facility]  CVICU Dietitian  Pager: [phone]

## 2018-04-11 NOTE — PLAN OF CARE
Problem: Patient Care Overview  Goal: Plan of Care/Patient Progress Review  Outcome: No Change  37 year old [place]  male with a history of biventricular acute on chronic congestive heart failure, recurrent methamphetamine use, family history of cardiomyopathy in mother, and cigarette use who presents for further management of biventricular heart failure.  Pt alert and oriented x4. Lethargic throughout shift. On room air throughout shift with occasional desats into high 80's for 5-10 seconds with movement an/or while in deep sleep. Pt has cough with small amount of clear sputum. On 2 gm, low sodium diet and on a 2,000 ml fluid restriction. 300 ml fluid intake since 0000. Good appetite. Adequate urine output this shift. On Nipride gtt - see MAR. [place] in place and arterial line in place. Blood pressures elevated, MDs aware. Continues on Nipride gtt - See MAR. On oral antihypertensives too - See MAR. Occasional headache throughout shift. PRN Tylenol given with adequate pain relief - See MAR and flowsheets.   Continue to monitor vital signs and wean Nipride gtt as tolerated. Closely monitor PA pressures and Notify team of any changes.     Problem: Cardiac Disease Comorbidity  Goal: Cardiac Disease  Patient comorbidity will be monitored for signs and symptoms of Cardiac Disease.  Problems will be absent, minimized or managed by discharge/transition of care.   Outcome: No Change  Pt continues on Nipride gtt and on oral medications. See MAR and doc/flowsheets.

## 2018-04-11 NOTE — PROGRESS NOTES
Cardiology Progress Note  Palomo Patterson MRN: [id number]  Age: 37 year old, : 1980  Date: 18              Changes Today:   - Up titrate hydralazine and Losartan for afterload reduction.  - Add Isosorbine dinitrate   - Add beta blocker         Assessment and Plan:     37 year old yo with past medical history significant for active nicotine use, methamphetamine use who presents with acute on chronic HFrEF decompensation most likely secondary to methamphtamine use.      Acute on chronic HFrEF exacerbation   Likely secondary to methamphetamine use however also possible mild component of family history contributing. Last methamphetamine use on 18. On admission underwent coronary angiogram and HD measurements showing CI - 1.5, EF - 5-10%,   Home regimen:   Hold PTA coreg 3.125BID, losartan-HCTZ 50/12.5, and amlodipine  Hold PTA Lasix 20mg BID.   - Volume: Hold all diuretics at this time until BP better controlled; will reassess with wedge in 1-2 days   - Afterload: Nipride gtt, ARB Losartan  - BB: Contraindicated secondary to meth use.   - Hemodynamics Q4h   - Biopsy results pending      RENUKA - improving  Baseline Cr 1.1-1.2 in 2017. Now Cr ~2015. Most likely cardiorenal.   - Continue to monitor      ACCESS: PIVx1  FEN: No fluids  On Electrolyte protocol  2L fluid restriction  PPX: Heparin   CODE: Full       Patient was discussed with staff attending, Dr. Garcia.     Payton Johnson  PGY-1 Internal Medicine  Cardiology Service          Subjective     No acute issues overnight.   Patient having no significant complaints. Family present in room during rounds and we answered questions to satisfaction.              Objective     Vitals:  Temp:  [98.1  F (36.7  C)-98.6  F (37  C)] 98.4  F (36.9  C)  Heart Rate:  [114-121] 120  Resp:  [11-32] 20  BP: (103-137)/() 107/76  MAP:  [97 mmHg-116 mmHg] 103 mmHg  Arterial Line BP: (113-206)/() 144/84  SpO2:  [92  %-99 %] 97 %  MAP: 110s     Tele: NO events     Hemodynamics in AM: CVP ~6, PA - 35/26, wedge ~25     Vitals:    04/09/18 1256 04/10/18 0600   Weight: 74.4 kg (164 lb) 76.4 kg (168 lb 6.9 oz)       Gen: AA&Ox3, no acute distress  HEENT: moist mucous membranes, no oropharyngeal erythema   PULM/THORAX: Clear to auscultation bilaterally, no rales/stridor/wheezes  CV:RRR, S1 and S2 appreciated, no extra heart sounds, murmurs or rub auscultated. No JVD  ABD: obese, soft, nontender, nondistended.   EXT: No edema           Data:      Labs reviewed. Pertinent for : K - 4.3, Cr improved to 1.08, Mg 3.6, WBC - 10.7  Anticoagulation:  No AC   Imaging reviewed.          Medications     Medications    losartan  50 mg Oral Daily     carvedilol  3.125 mg Oral BID w/meals     losartan  25 mg Oral QPM     hydrALAZINE  75 mg Oral 4x Daily     sodium chloride (PF)  3 mL Intracatheter Q8H     heparin  5,000 Units Subcutaneous Q8H     influenza quadrivalent (PF) vacc age 3 yrs and older  0.5 mL Intramuscular Prior to discharge       Reason beta blocker order not selected       nitroPRUsside (NIPRIDE) IV infusion ADULT/PEDS GREATER than or EQUAL to 45 kg std conc 5 mcg/kg/min (04/11/18 1139)                 I have reviewed today's vital signs, notes, medications, labs and imaging.  I have also seen and examined the patient and agree with the findings and plan as outlined above.  Pt sitting in chair.  No complaints.  Afebrile, , 152/95 with CVP 8 and CI 3.2 with SVR 1188.  Nipride gtt and hydralazine, cozaar and now isordil.  Lungs clear and S1 and S2 tachy with loud S3.  Labs with Cr 1.08, WBC 10.7.  Assessment: Endstage heart failure due to polysubstance abuse including meth.  Agree with increasing oral afterload reducing agents.  Total critical care time 20 min.     Tony Garcia MD, PhD  Professor, Heart Failure and Cardiac Transplantation  [facility]

## 2018-04-12 LAB
ANION GAP SERPL CALCULATED.3IONS-SCNC: 8 MMOL/L (ref 3–14)
BASE EXCESS BLDV CALC-SCNC: 1.2 MMOL/L
BASE EXCESS BLDV CALC-SCNC: 1.4 MMOL/L
BUN SERPL-MCNC: 12 MG/DL (ref 7–30)
CALCIUM SERPL-MCNC: 8.2 MG/DL (ref 8.5–10.1)
CHLORIDE SERPL-SCNC: 111 MMOL/L (ref 94–109)
CO2 SERPL-SCNC: 23 MMOL/L (ref 20–32)
CREAT SERPL-MCNC: 1.11 MG/DL (ref 0.66–1.25)
ERYTHROCYTE [DISTWIDTH] IN BLOOD BY AUTOMATED COUNT: 15.3 % (ref 10–15)
GFR SERPL CREATININE-BSD FRML MDRD: 75 ML/MIN/1.7M2
GLUCOSE SERPL-MCNC: 118 MG/DL (ref 70–99)
HCO3 BLDV-SCNC: 26 MMOL/L (ref 21–28)
HCO3 BLDV-SCNC: 26 MMOL/L (ref 21–28)
HCT VFR BLD AUTO: 39.5 % (ref 40–53)
HGB BLD-MCNC: 12.9 G/DL (ref 13.3–17.7)
MAGNESIUM SERPL-MCNC: 2.1 MG/DL (ref 1.6–2.3)
MCH RBC QN AUTO: 28 PG (ref 26.5–33)
MCHC RBC AUTO-ENTMCNC: 32.7 G/DL (ref 31.5–36.5)
MCV RBC AUTO: 86 FL (ref 78–100)
O2/TOTAL GAS SETTING VFR VENT: 21 %
O2/TOTAL GAS SETTING VFR VENT: 21 %
OXYHGB MFR BLDV: 67 %
OXYHGB MFR BLDV: 70 %
PCO2 BLDV: 38 MM HG (ref 40–50)
PCO2 BLDV: 43 MM HG (ref 40–50)
PH BLDV: 7.4 PH (ref 7.32–7.43)
PH BLDV: 7.43 PH (ref 7.32–7.43)
PLATELET # BLD AUTO: 203 10E9/L (ref 150–450)
PO2 BLDV: 39 MM HG (ref 25–47)
PO2 BLDV: 39 MM HG (ref 25–47)
POTASSIUM SERPL-SCNC: 4.4 MMOL/L (ref 3.4–5.3)
RBC # BLD AUTO: 4.6 10E12/L (ref 4.4–5.9)
SODIUM SERPL-SCNC: 142 MMOL/L (ref 133–144)
WBC # BLD AUTO: 12.3 10E9/L (ref 4–11)

## 2018-04-12 PROCEDURE — 40000014 ZZH STATISTIC ARTERIAL MONITORING DAILY

## 2018-04-12 PROCEDURE — 25000128 H RX IP 250 OP 636: Performed by: STUDENT IN AN ORGANIZED HEALTH CARE EDUCATION/TRAINING PROGRAM

## 2018-04-12 PROCEDURE — 85027 COMPLETE CBC AUTOMATED: CPT | Performed by: STUDENT IN AN ORGANIZED HEALTH CARE EDUCATION/TRAINING PROGRAM

## 2018-04-12 PROCEDURE — 25000132 ZZH RX MED GY IP 250 OP 250 PS 637: Performed by: INTERNAL MEDICINE

## 2018-04-12 PROCEDURE — 80048 BASIC METABOLIC PNL TOTAL CA: CPT | Performed by: STUDENT IN AN ORGANIZED HEALTH CARE EDUCATION/TRAINING PROGRAM

## 2018-04-12 PROCEDURE — 20000004 ZZH R&B ICU UMMC

## 2018-04-12 PROCEDURE — 25000132 ZZH RX MED GY IP 250 OP 250 PS 637: Performed by: STUDENT IN AN ORGANIZED HEALTH CARE EDUCATION/TRAINING PROGRAM

## 2018-04-12 PROCEDURE — 82805 BLOOD GASES W/O2 SATURATION: CPT | Performed by: STUDENT IN AN ORGANIZED HEALTH CARE EDUCATION/TRAINING PROGRAM

## 2018-04-12 PROCEDURE — 83735 ASSAY OF MAGNESIUM: CPT | Performed by: STUDENT IN AN ORGANIZED HEALTH CARE EDUCATION/TRAINING PROGRAM

## 2018-04-12 PROCEDURE — 99233 SBSQ HOSP IP/OBS HIGH 50: CPT | Mod: GC | Performed by: INTERNAL MEDICINE

## 2018-04-12 PROCEDURE — 40000048 ZZH STATISTIC DAILY SWAN MONITORING

## 2018-04-12 RX ORDER — AMLODIPINE BESYLATE 5 MG/1
5 TABLET ORAL AT BEDTIME
Status: DISCONTINUED | OUTPATIENT
Start: 2018-04-12 | End: 2018-04-15 | Stop reason: HOSPADM

## 2018-04-12 RX ORDER — CARVEDILOL 3.12 MG/1
6.25 TABLET ORAL 2 TIMES DAILY WITH MEALS
Status: DISCONTINUED | OUTPATIENT
Start: 2018-04-12 | End: 2018-04-15 | Stop reason: HOSPADM

## 2018-04-12 RX ORDER — LOSARTAN POTASSIUM 50 MG/1
50 TABLET ORAL EVERY EVENING
Status: DISCONTINUED | OUTPATIENT
Start: 2018-04-12 | End: 2018-04-13

## 2018-04-12 RX ORDER — ISOSORBIDE DINITRATE 20 MG/1
20 TABLET ORAL 3 TIMES DAILY
Status: DISCONTINUED | OUTPATIENT
Start: 2018-04-12 | End: 2018-04-14

## 2018-04-12 RX ORDER — HYDRALAZINE HYDROCHLORIDE 25 MG/1
100 TABLET, FILM COATED ORAL 3 TIMES DAILY
Status: DISCONTINUED | OUTPATIENT
Start: 2018-04-12 | End: 2018-04-15 | Stop reason: HOSPADM

## 2018-04-12 RX ADMIN — SODIUM THIOSULFATE 2 MCG/KG/MIN: 250 INJECTION, SOLUTION INTRAVENOUS at 13:32

## 2018-04-12 RX ADMIN — SODIUM THIOSULFATE 3 MCG/KG/MIN: 250 INJECTION, SOLUTION INTRAVENOUS at 03:54

## 2018-04-12 RX ADMIN — HYDRALAZINE HYDROCHLORIDE 100 MG: 25 TABLET ORAL at 19:56

## 2018-04-12 RX ADMIN — SODIUM THIOSULFATE 2 MCG/KG/MIN: 250 INJECTION, SOLUTION INTRAVENOUS at 21:40

## 2018-04-12 RX ADMIN — HEPARIN SODIUM 5000 UNITS: 5000 INJECTION, SOLUTION INTRAVENOUS; SUBCUTANEOUS at 17:32

## 2018-04-12 RX ADMIN — CARVEDILOL 6.25 MG: 6.25 TABLET, FILM COATED ORAL at 08:12

## 2018-04-12 RX ADMIN — ISOSORBIDE DINITRATE 20 MG: 20 TABLET ORAL at 19:55

## 2018-04-12 RX ADMIN — AMLODIPINE BESYLATE 5 MG: 5 TABLET ORAL at 21:57

## 2018-04-12 RX ADMIN — HEPARIN SODIUM 5000 UNITS: 5000 INJECTION, SOLUTION INTRAVENOUS; SUBCUTANEOUS at 01:52

## 2018-04-12 RX ADMIN — LOSARTAN POTASSIUM 50 MG: 50 TABLET ORAL at 19:56

## 2018-04-12 RX ADMIN — LOSARTAN POTASSIUM 50 MG: 50 TABLET ORAL at 08:12

## 2018-04-12 RX ADMIN — HEPARIN SODIUM 5000 UNITS: 5000 INJECTION, SOLUTION INTRAVENOUS; SUBCUTANEOUS at 09:54

## 2018-04-12 RX ADMIN — CARVEDILOL 6.25 MG: 6.25 TABLET, FILM COATED ORAL at 17:32

## 2018-04-12 RX ADMIN — HYDRALAZINE HYDROCHLORIDE 75 MG: 25 TABLET ORAL at 08:12

## 2018-04-12 RX ADMIN — ACETAMINOPHEN 650 MG: 325 TABLET, FILM COATED ORAL at 15:06

## 2018-04-12 RX ADMIN — ISOSORBIDE DINITRATE 20 MG: 20 TABLET ORAL at 15:06

## 2018-04-12 RX ADMIN — SODIUM THIOSULFATE 4 MCG/KG/MIN: 250 INJECTION, SOLUTION INTRAVENOUS at 08:56

## 2018-04-12 RX ADMIN — SODIUM THIOSULFATE 4.5 MCG/KG/MIN: 250 INJECTION, SOLUTION INTRAVENOUS at 06:31

## 2018-04-12 RX ADMIN — HYDRALAZINE HYDROCHLORIDE 100 MG: 25 TABLET ORAL at 15:06

## 2018-04-12 RX ADMIN — ISOSORBIDE DINITRATE 20 MG: 20 TABLET ORAL at 08:12

## 2018-04-12 NOTE — PLAN OF CARE
Problem: Patient Care Overview  Goal: Plan of Care/Patient Progress Review  Outcome: No Change  Patient remains in ICU on Cards 2 for Nipride titration. Nipride gtt titrated for SBP<140 and oral meds increased. DORA calculations from 0800 are CO = 5.1; CI =2.7. Patient is otherwise stable on RA, A&Ox4, moving x1 assist, voiding and stooling. Fluid and Na+ restrictions continued. Requires encouragement to participate in cares and to get out of bed. PRN Tylenol given x1 for headache with improvement. Patient and family updated at bedside. MDs updated throughout shift. Continue with gtt titration and BID DORA scores.     Problem: Cardiac Disease Comorbidity  Goal: Cardiac Disease  Patient comorbidity will be monitored for signs and symptoms of Cardiac Disease.  Problems will be absent, minimized or managed by discharge/transition of care.   Outcome: No Change  Remains on Nipride

## 2018-04-12 NOTE — PLAN OF CARE
Problem: Patient Care Overview  Goal: Plan of Care/Patient Progress Review  Outcome: No Change  37 year old [place]  male with a history of biventricular acute on chronic congestive heart failure, recurrent methamphetamine use, significant cardiac family history.  Pt alert and oriented x4. Lethargic and hypoactive throughout shift.  c/o headache x1 this shift. PRN Tylenol given with relief.  No other c/o pain.  Continue to monitor vital signs and titrate Nipride for CI >2 or MAP<90. Closely monitor PA pressures and Notify team of any changes.      Problem: Cardiac Disease Comorbidity  Goal: Cardiac Disease  Patient comorbidity will be monitored for signs and symptoms of Cardiac Disease.  Problems will be absent, minimized or managed by discharge/transition of care.   Outcome: No Change  Pt continues on Nipride gtt, titrated throughout shift and multiple changes to oral medications.

## 2018-04-12 NOTE — PROGRESS NOTES
Cardiology Progress Note  Palomo Patterson MRN: [id number]  Age: 37 year old, : 1980  Date: @sdate@            Changes Today:     - Continue to uptitrate afterload reduction  - Likely d/c swan today   - Likely will be hypertensive despite aggressive measures given etiology of CM        Assessment and Plan:     37 year old yo with past medical history significant for active nicotine use, methamphetamine use who presents with acute on chronic HFrEF decompensation most likely secondary to methamphtamine use.       Acute on chronic HFrEF exacerbation   Likely secondary to methamphetamine use however also possible mild component of family history contributing. Last methamphetamine use on 18. On admission underwent coronary angiogram and HD measurements showing CI - 1.5, EF - 5-10%,   Home regimen:   Hold PTA coreg 3.125BID, losartan-HCTZ 50/12.5, and amlodipine  Hold PTA Lasix 20mg BID.   - Volume: Hold all diuretics at this time until BP better controlled; will reassess with wedge in 1-2 days   - Afterload: Nipride gtt, ARB Losartan, Hydralazine, Isordil   - BB: Coreg 6.25mg BID  - Hemodynamics BID  - Biopsy results pending       RENUKA - improving  Baseline Cr 1.1-1.2 in 2017 - stable as of . Most likely cardiorenal initially.   - Continue to monitor       ACCESS: PIVx1  FEN: No fluids  On Electrolyte protocol  2L fluid restriction  PPX: Heparin   CODE: Full       Patient was discussed with staff attending, Dr. Garcia.      Payton Johnson  PGY-1 Internal Medicine  Cardiology Service          Subjective     Patient having no significant issues overnight. Feeling well.   No cp, sob or other issues.           Objective     Vitals:  Temp:  [97.8  F (36.6  C)-98.4  F (36.9  C)] 98.4  F (36.9  C)  Heart Rate:  [104-121] 110  Resp:  [16-21] 16  BP: ()/() 110/70  MAP:  [84 mmHg-118 mmHg] 89 mmHg  Arterial Line BP: (107-152)/(64-98) 112/77  SpO2:  [88 %-99 %]  98 %    HD in AM:   cvp - 6, pa - 40/ 19, Wedge - 16   CO - 5.1, CI - 2.7    Vitals:    04/09/18 1256 04/10/18 0600 04/12/18 0400   Weight: 74.4 kg (164 lb) 76.4 kg (168 lb 6.9 oz) 77.1 kg (169 lb 15.6 oz)       Gen: AA&Ox3, no acute distress  PULM/THORAX: Clear to auscultation bilaterally, no rales/stridor/wheezes  CV:RRR, S1 and S2 appreciated, S3 appreciable.   ABD: soft, nt, nd   EXT: no edema          Data:      Labs reviewed. Pertinent for : k-4.4, Cr 1.11, Mg - 2.1         Medications     Medications    losartan  50 mg Oral QPM     carvedilol  6.25 mg Oral BID w/meals     amLODIPine  5 mg Oral At Bedtime     isosorbide dinitrate  20 mg Oral TID     losartan  50 mg Oral Daily     hydrALAZINE  75 mg Oral TID     sodium chloride (PF)  3 mL Intracatheter Q8H     heparin  5,000 Units Subcutaneous Q8H     influenza quadrivalent (PF) vacc age 3 yrs and older  0.5 mL Intramuscular Prior to discharge       Reason beta blocker order not selected       nitroPRUsside (NIPRIDE) IV infusion ADULT/PEDS GREATER than or EQUAL to 45 kg std conc 3 mcg/kg/min (04/12/18 1000)             I have reviewed today's vital signs, notes, medications, labs and imaging.  I have also seen and examined the patient and agree with the findings and plan as outlined above.  Pt up in chair and without complaints.  VSS with CVP 12, CI 2.4 on nipride.  3.7L UO.  Lungs clear and S1 and S2 with +S3.  Labs with Cr 1.1 and WBC 12.3.  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  Pt seen X2 for total critical care time 20 min.     Tony Garcia MD, PhD  Professor, Heart Failure and Cardiac Transplantation  [facility]

## 2018-04-12 NOTE — PROGRESS NOTES
D: Heart Failure  I/A: Pt A&Ox4, able to make needs known, follows commands.  Sinus tach 100-120s. Nipride gtt titrated from 2 to 5 throughout night for MAPs <90.  CVP 12, PA 48/34, SvO2 68, CO 4.5, CI 2.4, SVR 1658.  Pt refused hygiene cares.   P: Continue to monitor and follow plan of care.  Notify team of changes in medical condition.

## 2018-04-13 LAB
ANION GAP SERPL CALCULATED.3IONS-SCNC: 5 MMOL/L (ref 3–14)
BASE EXCESS BLDV CALC-SCNC: 0.5 MMOL/L
BASE EXCESS BLDV CALC-SCNC: 0.8 MMOL/L
BASE EXCESS BLDV CALC-SCNC: 1 MMOL/L
BUN SERPL-MCNC: 15 MG/DL (ref 7–30)
CALCIUM SERPL-MCNC: 8.7 MG/DL (ref 8.5–10.1)
CHLORIDE SERPL-SCNC: 110 MMOL/L (ref 94–109)
CO2 SERPL-SCNC: 25 MMOL/L (ref 20–32)
COPATH REPORT: NORMAL
CREAT SERPL-MCNC: 1.12 MG/DL (ref 0.66–1.25)
ERYTHROCYTE [DISTWIDTH] IN BLOOD BY AUTOMATED COUNT: 15.3 % (ref 10–15)
GFR SERPL CREATININE-BSD FRML MDRD: 74 ML/MIN/1.7M2
GLUCOSE SERPL-MCNC: 101 MG/DL (ref 70–99)
HCO3 BLDV-SCNC: 25 MMOL/L (ref 21–28)
HCO3 BLDV-SCNC: 26 MMOL/L (ref 21–28)
HCO3 BLDV-SCNC: 26 MMOL/L (ref 21–28)
HCT VFR BLD AUTO: 41.2 % (ref 40–53)
HGB BLD-MCNC: 13.1 G/DL (ref 13.3–17.7)
MAGNESIUM SERPL-MCNC: 1.9 MG/DL (ref 1.6–2.3)
MCH RBC QN AUTO: 27.4 PG (ref 26.5–33)
MCHC RBC AUTO-ENTMCNC: 31.8 G/DL (ref 31.5–36.5)
MCV RBC AUTO: 86 FL (ref 78–100)
O2/TOTAL GAS SETTING VFR VENT: 21 %
OXYHGB MFR BLDV: 64 %
OXYHGB MFR BLDV: 65 %
OXYHGB MFR BLDV: 67 %
PCO2 BLDV: 39 MM HG (ref 40–50)
PCO2 BLDV: 43 MM HG (ref 40–50)
PCO2 BLDV: 43 MM HG (ref 40–50)
PH BLDV: 7.39 PH (ref 7.32–7.43)
PH BLDV: 7.4 PH (ref 7.32–7.43)
PH BLDV: 7.42 PH (ref 7.32–7.43)
PLATELET # BLD AUTO: 207 10E9/L (ref 150–450)
PO2 BLDV: 36 MM HG (ref 25–47)
PO2 BLDV: 39 MM HG (ref 25–47)
PO2 BLDV: 39 MM HG (ref 25–47)
POTASSIUM SERPL-SCNC: 4.5 MMOL/L (ref 3.4–5.3)
RBC # BLD AUTO: 4.78 10E12/L (ref 4.4–5.9)
SODIUM SERPL-SCNC: 140 MMOL/L (ref 133–144)
WBC # BLD AUTO: 13.6 10E9/L (ref 4–11)

## 2018-04-13 PROCEDURE — 40000048 ZZH STATISTIC DAILY SWAN MONITORING

## 2018-04-13 PROCEDURE — 99233 SBSQ HOSP IP/OBS HIGH 50: CPT | Mod: GC | Performed by: INTERNAL MEDICINE

## 2018-04-13 PROCEDURE — 25000132 ZZH RX MED GY IP 250 OP 250 PS 637: Performed by: STUDENT IN AN ORGANIZED HEALTH CARE EDUCATION/TRAINING PROGRAM

## 2018-04-13 PROCEDURE — 82805 BLOOD GASES W/O2 SATURATION: CPT | Performed by: STUDENT IN AN ORGANIZED HEALTH CARE EDUCATION/TRAINING PROGRAM

## 2018-04-13 PROCEDURE — 25000128 H RX IP 250 OP 636: Performed by: STUDENT IN AN ORGANIZED HEALTH CARE EDUCATION/TRAINING PROGRAM

## 2018-04-13 PROCEDURE — 40000275 ZZH STATISTIC RCP TIME EA 10 MIN

## 2018-04-13 PROCEDURE — 80048 BASIC METABOLIC PNL TOTAL CA: CPT | Performed by: INTERNAL MEDICINE

## 2018-04-13 PROCEDURE — 12000008 ZZH R&B INTERMEDIATE UMMC

## 2018-04-13 PROCEDURE — 25000132 ZZH RX MED GY IP 250 OP 250 PS 637: Performed by: INTERNAL MEDICINE

## 2018-04-13 PROCEDURE — 83735 ASSAY OF MAGNESIUM: CPT | Performed by: INTERNAL MEDICINE

## 2018-04-13 PROCEDURE — 85027 COMPLETE CBC AUTOMATED: CPT | Performed by: INTERNAL MEDICINE

## 2018-04-13 RX ORDER — LOSARTAN POTASSIUM 100 MG/1
100 TABLET ORAL EVERY EVENING
Status: DISCONTINUED | OUTPATIENT
Start: 2018-04-13 | End: 2018-04-15 | Stop reason: HOSPADM

## 2018-04-13 RX ADMIN — SODIUM THIOSULFATE 2 MCG/KG/MIN: 250 INJECTION, SOLUTION INTRAVENOUS at 07:05

## 2018-04-13 RX ADMIN — CARVEDILOL 6.25 MG: 6.25 TABLET, FILM COATED ORAL at 17:45

## 2018-04-13 RX ADMIN — ISOSORBIDE DINITRATE 20 MG: 20 TABLET ORAL at 08:25

## 2018-04-13 RX ADMIN — HYDRALAZINE HYDROCHLORIDE 100 MG: 25 TABLET ORAL at 08:25

## 2018-04-13 RX ADMIN — CARVEDILOL 6.25 MG: 6.25 TABLET, FILM COATED ORAL at 08:25

## 2018-04-13 RX ADMIN — HEPARIN SODIUM 5000 UNITS: 5000 INJECTION, SOLUTION INTRAVENOUS; SUBCUTANEOUS at 00:47

## 2018-04-13 RX ADMIN — LOSARTAN POTASSIUM 50 MG: 50 TABLET ORAL at 08:25

## 2018-04-13 RX ADMIN — ISOSORBIDE DINITRATE 20 MG: 20 TABLET ORAL at 20:05

## 2018-04-13 RX ADMIN — HEPARIN SODIUM 5000 UNITS: 5000 INJECTION, SOLUTION INTRAVENOUS; SUBCUTANEOUS at 23:40

## 2018-04-13 RX ADMIN — AMLODIPINE BESYLATE 5 MG: 5 TABLET ORAL at 21:34

## 2018-04-13 RX ADMIN — HYDRALAZINE HYDROCHLORIDE 100 MG: 25 TABLET ORAL at 14:25

## 2018-04-13 RX ADMIN — SODIUM THIOSULFATE 3 MCG/KG/MIN: 250 INJECTION, SOLUTION INTRAVENOUS at 01:51

## 2018-04-13 RX ADMIN — LOSARTAN POTASSIUM 100 MG: 50 TABLET ORAL at 20:05

## 2018-04-13 RX ADMIN — HYDRALAZINE HYDROCHLORIDE 100 MG: 25 TABLET ORAL at 20:05

## 2018-04-13 RX ADMIN — HEPARIN SODIUM 5000 UNITS: 5000 INJECTION, SOLUTION INTRAVENOUS; SUBCUTANEOUS at 15:59

## 2018-04-13 RX ADMIN — HEPARIN SODIUM 5000 UNITS: 5000 INJECTION, SOLUTION INTRAVENOUS; SUBCUTANEOUS at 08:25

## 2018-04-13 RX ADMIN — ISOSORBIDE DINITRATE 20 MG: 20 TABLET ORAL at 14:25

## 2018-04-13 NOTE — PLAN OF CARE
Problem: Patient Care Overview  Goal: Plan of Care/Patient Progress Review  Outcome: No Change    Neuro: A&Ox4. Moves all extremities. Denied pain.    Cardiac: ST. HR 110s. Remains on Nipride gtt titrated to SBP <140. 2100 Dora calculations are CO = 4.4, CI = 2.4. [place] measurement markings remain unchanged at 52. Arterial line positional.    Respiratory: Lung sounds clear with diminished bases. On RA. Frequent dry, nonproductive cough present; pt declined Cepacol lozenge.     GI/: Voiding adequate amounts. Remains on fluid restriction.     Plan: BID DORA scores. Titrate Nipride gtt.   Sister remained at bedside overnight.   Continue to monitor and update MD as needed.    Problem: Cardiac Disease Comorbidity  Goal: Cardiac Disease  Patient comorbidity will be monitored for signs and symptoms of Cardiac Disease.  Problems will be absent, minimized or managed by discharge/transition of care.   Outcome: No Change  Pt remains on Nipride gtt.
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
## 2018-04-13 NOTE — PLAN OF CARE
Problem: Cardiac: Heart Failure (Adult)  Goal: Signs and Symptoms of Listed Potential Problems Will be Absent, Minimized or Managed (Cardiac: Heart Failure)  Signs and symptoms of listed potential problems will be absent, minimized or managed by discharge/transition of care (reference Cardiac: Heart Failure (Adult) CPG).   Outcome: Improving  VSS and has denied any pain throughout the day. PA catheter and A-line removed today with no problems. Tolerating oral diet with no problems. Up in room with stand-by-assist. Will continue to encourage activity as tolerated. Plan to transfer to  when bed becomes available.

## 2018-04-14 LAB
ANION GAP SERPL CALCULATED.3IONS-SCNC: 9 MMOL/L (ref 3–14)
BUN SERPL-MCNC: 18 MG/DL (ref 7–30)
CALCIUM SERPL-MCNC: 9.2 MG/DL (ref 8.5–10.1)
CHLORIDE SERPL-SCNC: 111 MMOL/L (ref 94–109)
CO2 SERPL-SCNC: 21 MMOL/L (ref 20–32)
CREAT SERPL-MCNC: 1.18 MG/DL (ref 0.66–1.25)
ERYTHROCYTE [DISTWIDTH] IN BLOOD BY AUTOMATED COUNT: 15.5 % (ref 10–15)
GFR SERPL CREATININE-BSD FRML MDRD: 70 ML/MIN/1.7M2
GLUCOSE SERPL-MCNC: 89 MG/DL (ref 70–99)
HCT VFR BLD AUTO: 44.6 % (ref 40–53)
HGB BLD-MCNC: 14.6 G/DL (ref 13.3–17.7)
MAGNESIUM SERPL-MCNC: 2.2 MG/DL (ref 1.6–2.3)
MCH RBC QN AUTO: 28.1 PG (ref 26.5–33)
MCHC RBC AUTO-ENTMCNC: 32.7 G/DL (ref 31.5–36.5)
MCV RBC AUTO: 86 FL (ref 78–100)
PLATELET # BLD AUTO: 231 10E9/L (ref 150–450)
POTASSIUM SERPL-SCNC: 4.6 MMOL/L (ref 3.4–5.3)
RBC # BLD AUTO: 5.19 10E12/L (ref 4.4–5.9)
SODIUM SERPL-SCNC: 141 MMOL/L (ref 133–144)
WBC # BLD AUTO: 10.4 10E9/L (ref 4–11)

## 2018-04-14 PROCEDURE — 25000132 ZZH RX MED GY IP 250 OP 250 PS 637: Performed by: STUDENT IN AN ORGANIZED HEALTH CARE EDUCATION/TRAINING PROGRAM

## 2018-04-14 PROCEDURE — 25000128 H RX IP 250 OP 636: Performed by: STUDENT IN AN ORGANIZED HEALTH CARE EDUCATION/TRAINING PROGRAM

## 2018-04-14 PROCEDURE — 90686 IIV4 VACC NO PRSV 0.5 ML IM: CPT | Performed by: INTERNAL MEDICINE

## 2018-04-14 PROCEDURE — 83735 ASSAY OF MAGNESIUM: CPT | Performed by: INTERNAL MEDICINE

## 2018-04-14 PROCEDURE — 25000132 ZZH RX MED GY IP 250 OP 250 PS 637: Performed by: INTERNAL MEDICINE

## 2018-04-14 PROCEDURE — 40000048 ZZH STATISTIC DAILY SWAN MONITORING

## 2018-04-14 PROCEDURE — 99233 SBSQ HOSP IP/OBS HIGH 50: CPT | Mod: GC | Performed by: INTERNAL MEDICINE

## 2018-04-14 PROCEDURE — 40000275 ZZH STATISTIC RCP TIME EA 10 MIN

## 2018-04-14 PROCEDURE — 40000196 ZZH STATISTIC RAPCV CVP MONITORING

## 2018-04-14 PROCEDURE — 36415 COLL VENOUS BLD VENIPUNCTURE: CPT | Performed by: INTERNAL MEDICINE

## 2018-04-14 PROCEDURE — 25000128 H RX IP 250 OP 636: Performed by: INTERNAL MEDICINE

## 2018-04-14 PROCEDURE — 80048 BASIC METABOLIC PNL TOTAL CA: CPT | Performed by: INTERNAL MEDICINE

## 2018-04-14 PROCEDURE — 85027 COMPLETE CBC AUTOMATED: CPT | Performed by: INTERNAL MEDICINE

## 2018-04-14 PROCEDURE — 21400006 ZZH R&B CCU INTERMEDIATE UMMC

## 2018-04-14 RX ORDER — ISOSORBIDE MONONITRATE 60 MG/1
60 TABLET, EXTENDED RELEASE ORAL DAILY
Status: DISCONTINUED | OUTPATIENT
Start: 2018-04-14 | End: 2018-04-15 | Stop reason: HOSPADM

## 2018-04-14 RX ADMIN — CARVEDILOL 6.25 MG: 6.25 TABLET, FILM COATED ORAL at 17:14

## 2018-04-14 RX ADMIN — ISOSORBIDE MONONITRATE 60 MG: 60 TABLET, EXTENDED RELEASE ORAL at 08:38

## 2018-04-14 RX ADMIN — HEPARIN SODIUM 5000 UNITS: 5000 INJECTION, SOLUTION INTRAVENOUS; SUBCUTANEOUS at 16:48

## 2018-04-14 RX ADMIN — HEPARIN SODIUM 5000 UNITS: 5000 INJECTION, SOLUTION INTRAVENOUS; SUBCUTANEOUS at 23:37

## 2018-04-14 RX ADMIN — CARVEDILOL 6.25 MG: 6.25 TABLET, FILM COATED ORAL at 08:38

## 2018-04-14 RX ADMIN — AMLODIPINE BESYLATE 5 MG: 5 TABLET ORAL at 22:16

## 2018-04-14 RX ADMIN — HYDRALAZINE HYDROCHLORIDE 100 MG: 25 TABLET ORAL at 20:34

## 2018-04-14 RX ADMIN — HEPARIN SODIUM 5000 UNITS: 5000 INJECTION, SOLUTION INTRAVENOUS; SUBCUTANEOUS at 08:38

## 2018-04-14 RX ADMIN — LOSARTAN POTASSIUM 100 MG: 50 TABLET ORAL at 20:34

## 2018-04-14 RX ADMIN — LOSARTAN POTASSIUM 50 MG: 50 TABLET ORAL at 08:39

## 2018-04-14 RX ADMIN — HYDRALAZINE HYDROCHLORIDE 100 MG: 25 TABLET ORAL at 16:47

## 2018-04-14 RX ADMIN — HYDRALAZINE HYDROCHLORIDE 100 MG: 25 TABLET ORAL at 08:39

## 2018-04-14 NOTE — PROGRESS NOTES
Cardiology Progress Note  Palomo Patterson MRN: [id number]  Age: 37 year old, : 1980  Date: 18            Changes Today:     - Change Isosorbide dinitrate to isosorbide mononitrate.   - Encourage ambulation and PT consult in place. Would like to ensure no significant ortho stasis.     Dispo: Likley discharge 4/15. OK to transfer to floor, cardiac telemetry.         Assessment and Plan:     37 year old yo with past medical history significant for active nicotine use, methamphetamine use who presents with acute on chronic HFrEF decompensation most likely secondary to methamphtamine use.       Acute on chronic HFrEF exacerbation   Likely secondary to methamphetamine use however also possible mild component of family history contributing. Last methamphetamine use on 18. On admission underwent coronary angiogram and HD measurements showing CI - 1.5, EF - 5-10%,   Previous Home regimen:   Hold PTA coreg 3.125BID, losartan-HCTZ 50/12.5, and amlodipine  Hold PTA Lasix 20mg BID.     Jayton Regimen:  - Volume: Hold all diuretics. CVP ~6-8  - Afterload: Losartan 50mg QAM, 100mg QPM, Hydralazine 100mg TID, Isosorbide mononitrate 60mg Qd,   - BB: Coreg 6.25mg BID  - Ca channel blocker: Amlodipine 5mg for HTN QPM   ---  - Biopsy result pending    RENUKA - improving  Baseline Cr 1.1-1.2 in 2017 - stable as of .       ACCESS: PIVx1  FEN: No fluids  On Electrolyte protocol  2L fluid restriction  PPX: Heparin   CODE: Full       Patient was discussed with staff attending, Dr. Garcia.      Payton Johnson  PGY-1 Internal Medicine  Cardiology Service          Subjective     NO acute events overnight. Without issue. Hoping fr discharge however has not been out of bed much. Encouraged such on rounds this AM.   Ongoing cough; discussed a referral to pulmonology on discharge. tony herrera Has been in [facility] in the past and tested for tuberculosis previously ([facility] term >1y).            Objective     Vitals:  Temp:  [97.6  F (36.4  C)-98.5  F (36.9  C)] 98.2  F (36.8  C)  Heart Rate:  [101-107] 101  Resp:  [11-29] 29  BP: ()/(58-83) 98/76  SpO2:  [91 %-99 %] 95 %      Vitals:    04/12/18 0400 04/13/18 0400 04/13/18 2000   Weight: 77.1 kg (169 lb 15.6 oz) 73 kg (160 lb 15 oz) 75.7 kg (166 lb 14.2 oz)     Gen: AA&Ox3, no acute distress   PULM/THORAX: Clear to auscultation bilaterally, no rales/stridor/wheezes  CV:RRR, S1 and S2 appreciated, no jvd appreciable  ABD: soft, nt   EXT:no edema, warm           Data:      Labs reviewed. Pertinent for : k-4.6, Cr 1.18        Medications     Medications    isosorbide mononitrate  60 mg Oral Daily     losartan  100 mg Oral QPM     carvedilol  6.25 mg Oral BID w/meals     amLODIPine  5 mg Oral At Bedtime     hydrALAZINE  100 mg Oral TID     losartan  50 mg Oral Daily     sodium chloride (PF)  3 mL Intracatheter Q8H     heparin  5,000 Units Subcutaneous Q8H     influenza quadrivalent (PF) vacc age 3 yrs and older  0.5 mL Intramuscular Prior to discharge             I have reviewed today's vital signs, notes, medications, labs and imaging.  I have also seen and examined the patient and agree with the findings and plan as outlined above.  Pt with sister at bedside.  No complaints. 98.5, , RR 14, /80.  Meds include hydralazine 100mg TID, losartan 100/50, coreg 6.25 mg BID, imdur 60 mg and amlodipine.  Lungs clear and S1 and S2 with soft S3.  JVP 7 cm. Labs with Cr 1.18, WBC 10.4.  Assessment: Pt with endstage heart failure (EF 5%) due to meth use now on oral afterload reducing agents.  Will advance activity.     Tony Garcia MD, PhD  Professor, Heart Failure and Cardiac Transplantation  [facility]

## 2018-04-14 NOTE — PLAN OF CARE
Problem: Patient Care Overview  Goal: Plan of Care/Patient Progress Review  PT 4C: Cancel- PT orders received. OT evaluated patient on 4/10 and is following, per OT pt mobilizes with SBA and are recommending patient return home with assist and home therapies. Per discussion with RN patient ambulated hallways today with no concerns. Notified patient is likely discharging home tomorrow, OT will see in AM tomorrow to address any further mobility concerns prior to discharge.

## 2018-04-14 NOTE — PLAN OF CARE
Problem: Patient Care Overview  Goal: Discharge Needs Assessment  Outcome: Improving  D/I: Patient was up walking in the hallway x2; he has a lot of questions regarding what type of activity (sports and etc) he can do upon discharge.  Advised him to talk to the MDs regarding his current heart function.  Pt. Stated he had a brother pass away in the middle of playing sports at the same age that he is now.   Did extensive teaching regarding MRSA with handouts.  Pt and significant other had a great deal of questions.  Discussed patient's drug use and the fact that it could kill him if he doesn't stop.    A: Patient appears to want to live an active life; he is motivated to ambulate in the hallway.  He seems anxious to learn of his limitations.  P: Continue to provide answers and ensure that all of these questions are answered upon discharge, which is slated for tomorrow.

## 2018-04-14 NOTE — PLAN OF CARE
"Problem: Patient Care Overview  Goal: Plan of Care/Patient Progress Review  Outcome: Improving  Transfer orders to 6C.  D: Afebrile. VSS. HR tachycardic, MDs aware. SBP 120s-130s/80s. MAP 100s. RA, SpO2 97%.  A/I: Pt no s/sx of chest pain, swelling, difficulty breathing. Weight trended. Pt does have cough infrequently throughout night but states that he's \"had cough the entire hospital stay\" and that it is not new. Pt able to relax enough to sleep inbetween coughs. Art line and swan sites clean, dry and intact. Pt SBA to commode, pt tolerated walking/standing well, no changes in VS. pt had 1 very large BM - tan in appearance. Drank 1,600 mL out of 2 L fluid restriction. Tolerated dinner well. Continue to monitor.     P: Continue to assess and monitor and plan of care. Transfer orders to 6C.     Problem: Cardiac: Heart Failure (Adult)  Goal: Signs and Symptoms of Listed Potential Problems Will be Absent, Minimized or Managed (Cardiac: Heart Failure)  Signs and symptoms of listed potential problems will be absent, minimized or managed by discharge/transition of care (reference Cardiac: Heart Failure (Adult) CPG).   Outcome: Improving  Bennet pulled. Continue oral meds. HR/BP stable.       "

## 2018-04-15 VITALS
OXYGEN SATURATION: 97 % | HEIGHT: 63 IN | HEART RATE: 106 BPM | DIASTOLIC BLOOD PRESSURE: 75 MMHG | SYSTOLIC BLOOD PRESSURE: 103 MMHG | TEMPERATURE: 97.6 F | RESPIRATION RATE: 18 BRPM | WEIGHT: 161.6 LBS | BODY MASS INDEX: 28.63 KG/M2

## 2018-04-15 LAB
ANION GAP SERPL CALCULATED.3IONS-SCNC: 9 MMOL/L (ref 3–14)
BUN SERPL-MCNC: 23 MG/DL (ref 7–30)
CALCIUM SERPL-MCNC: 9 MG/DL (ref 8.5–10.1)
CHLORIDE SERPL-SCNC: 110 MMOL/L (ref 94–109)
CO2 SERPL-SCNC: 21 MMOL/L (ref 20–32)
CREAT SERPL-MCNC: 1.25 MG/DL (ref 0.66–1.25)
ERYTHROCYTE [DISTWIDTH] IN BLOOD BY AUTOMATED COUNT: 15.6 % (ref 10–15)
GFR SERPL CREATININE-BSD FRML MDRD: 65 ML/MIN/1.7M2
GLUCOSE SERPL-MCNC: 79 MG/DL (ref 70–99)
HCT VFR BLD AUTO: 46.8 % (ref 40–53)
HGB BLD-MCNC: 15.4 G/DL (ref 13.3–17.7)
MAGNESIUM SERPL-MCNC: 1.9 MG/DL (ref 1.6–2.3)
MCH RBC QN AUTO: 28.6 PG (ref 26.5–33)
MCHC RBC AUTO-ENTMCNC: 32.9 G/DL (ref 31.5–36.5)
MCV RBC AUTO: 87 FL (ref 78–100)
PLATELET # BLD AUTO: 237 10E9/L (ref 150–450)
POTASSIUM SERPL-SCNC: 4.4 MMOL/L (ref 3.4–5.3)
RBC # BLD AUTO: 5.39 10E12/L (ref 4.4–5.9)
SODIUM SERPL-SCNC: 140 MMOL/L (ref 133–144)
WBC # BLD AUTO: 11.6 10E9/L (ref 4–11)

## 2018-04-15 PROCEDURE — 25000132 ZZH RX MED GY IP 250 OP 250 PS 637: Performed by: INTERNAL MEDICINE

## 2018-04-15 PROCEDURE — 25000132 ZZH RX MED GY IP 250 OP 250 PS 637: Performed by: STUDENT IN AN ORGANIZED HEALTH CARE EDUCATION/TRAINING PROGRAM

## 2018-04-15 PROCEDURE — 83735 ASSAY OF MAGNESIUM: CPT | Performed by: INTERNAL MEDICINE

## 2018-04-15 PROCEDURE — 85027 COMPLETE CBC AUTOMATED: CPT | Performed by: INTERNAL MEDICINE

## 2018-04-15 PROCEDURE — 80048 BASIC METABOLIC PNL TOTAL CA: CPT | Performed by: INTERNAL MEDICINE

## 2018-04-15 PROCEDURE — 99239 HOSP IP/OBS DSCHRG MGMT >30: CPT | Mod: GC | Performed by: INTERNAL MEDICINE

## 2018-04-15 PROCEDURE — 36415 COLL VENOUS BLD VENIPUNCTURE: CPT | Performed by: INTERNAL MEDICINE

## 2018-04-15 PROCEDURE — 85049 AUTOMATED PLATELET COUNT: CPT | Performed by: INTERNAL MEDICINE

## 2018-04-15 RX ORDER — CARVEDILOL 3.12 MG/1
6.25 TABLET ORAL 2 TIMES DAILY WITH MEALS
Qty: 60 TABLET | Refills: 1 | Status: SHIPPED | OUTPATIENT
Start: 2018-04-15 | End: 2018-05-30

## 2018-04-15 RX ORDER — ASPIRIN 81 MG/1
81 TABLET, CHEWABLE ORAL DAILY
Qty: 36 TABLET | Refills: 0 | Status: SHIPPED | OUTPATIENT
Start: 2018-04-15 | End: 2018-04-15

## 2018-04-15 RX ORDER — AMLODIPINE BESYLATE 2.5 MG/1
5 TABLET ORAL AT BEDTIME
Qty: 30 TABLET | Refills: 1 | Status: SHIPPED | OUTPATIENT
Start: 2018-04-15 | End: 2018-05-08

## 2018-04-15 RX ORDER — ASPIRIN 81 MG/1
81 TABLET, CHEWABLE ORAL DAILY
Qty: 36 TABLET | Refills: 0
Start: 2018-04-15 | End: 2018-05-08

## 2018-04-15 RX ORDER — LOSARTAN POTASSIUM 50 MG/1
TABLET ORAL
Qty: 90 TABLET | Refills: 1 | Status: SHIPPED | OUTPATIENT
Start: 2018-04-15 | End: 2018-06-21

## 2018-04-15 RX ORDER — HYDRALAZINE HYDROCHLORIDE 100 MG/1
100 TABLET, FILM COATED ORAL 3 TIMES DAILY
Qty: 120 TABLET | Refills: 1 | Status: SHIPPED | OUTPATIENT
Start: 2018-04-15 | End: 2018-12-06

## 2018-04-15 RX ORDER — ISOSORBIDE MONONITRATE 60 MG/1
60 TABLET, EXTENDED RELEASE ORAL EVERY MORNING
Qty: 30 TABLET | Refills: 1 | Status: SHIPPED | OUTPATIENT
Start: 2018-04-15 | End: 2018-06-30

## 2018-04-15 RX ADMIN — HYDRALAZINE HYDROCHLORIDE 100 MG: 25 TABLET ORAL at 13:53

## 2018-04-15 RX ADMIN — HYDRALAZINE HYDROCHLORIDE 100 MG: 25 TABLET ORAL at 08:10

## 2018-04-15 RX ADMIN — LOSARTAN POTASSIUM 50 MG: 50 TABLET ORAL at 08:10

## 2018-04-15 RX ADMIN — ISOSORBIDE MONONITRATE 60 MG: 60 TABLET, EXTENDED RELEASE ORAL at 08:11

## 2018-04-15 RX ADMIN — CARVEDILOL 6.25 MG: 6.25 TABLET, FILM COATED ORAL at 08:10

## 2018-04-15 NOTE — DISCHARGE SUMMARY
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 We recommend repeat BMP and Mg.   -  Advanced heart failure clinic () and [facility] ; Referral placed to ensure you are seen within 1-2 weeks by an NP in the [facility] and within 1-2 months by  your cardiologist.         Pending Results:   Myocardial Biopsy - 4/9/18         Hospital Course by Problem:    *Please see H&P from 4/9/18 for further details*  Briefly, 37 year old [place]  male with a history of biventricular acute on chronic congestive heart failure, recurrent methamphetamine use, nicotine use, family history of cardiomyopathy in mother and sudden cardiac death in cousin, who presents for acute on chronic HFrEF exacerbation, management of biventricular HF who underwent Right Heart Catheterization, Coronary Angiogram and Myocardial biopsy, and subsequently underwent optimization of medication regimen.     Acute on Chronic HFrEF decompensation   Patient followed by  last sen on 04/05/18 with scheduled Angiogram, RHC and biopsy for Monday 4/9/18 thus admitted.  "Last methamphetamine use on 04/08/18 day prior to admission. Procedure details are below; RHC showing elevated right and left sided filling pressures as well as elevated PCW pressures. Initially placed on nipride gtt and afterload oral agents were optimized. Ultimately with improvement in hemodynamics on discharge:CVP-10, PA-46/20/29, Carolyn CI -2.3, SVR-1786.   ---  Medication regimen on discharge:   - Ace: Losartan 50mg QAM and 100mg QPM    - Afterload agents: Hydralazine 100mg TID, Imdur: 60mg Qd   - BB: Coreg 6.25mg BID    - Prior to admission Ca Channel blocker Amlodipine 5mg QPM   - Cor clinic referral   - Advanced Heart Failure clinic follow up     RENUKA likely Cardiorenal Syndrome    Presented with Cr at 1.53. Creatinine down trended with increased afterload reduction; discharged with Cr of 1.2.     Methamphetamine Use   Discussed extensively with patient and discussed that he is to set up primary care and subsequently discuss chemical dependency counseling to prevent further cardiac issues.     Nicotine Use Disorder  - Smoking cessation referral     Chronic Cough   Patient with cough for approximately 6 months, non productive with significant history of incarceration (>5y).   ---  - Pulmonology referral per patient request     Physical Exam on day of Discharge:  Blood pressure 103/75, pulse 106, temperature 97.6  F (36.4  C), temperature source Oral, resp. rate 18, height 1.6 m (5' 3\"), weight 73.3 kg (161 lb 9.6 oz), SpO2 97 %.    General:  Alert, not in respiratory or painful distress, Not toxic looking, afebrile, not pale,   HEENT: anicteric sclera, EOMI, MMM  CV: RRR, nl S1/S2, S3 appreciable, no m/r/g; intact & symmetric 3+ pulses (radial, DP); JVD not appreciable   Lungs: CTAB, no wheezing/crackles, no cough  Abd: Full, bs+, non tender to palpation   Ext: WWP, no BLE edema  Neuro:  AOx3, CN II-XII intact and symmetric, No focal neurologic deficits    Lab Studies on Day of Discharge:   Last CBC:   Recent " Labs   Lab Test  04/15/18   0718   WBC  11.6*   RBC  5.39   HGB  15.4   HCT  46.8   MCV  87   MCH  28.6   MCHC  32.9   RDW  15.6*   PLT  237       Last CMP:  Recent Labs   Lab Test  04/15/18   0718   04/05/18   1531   NA  140   < >  140   POTASSIUM  4.4   < >  3.7   CHLORIDE  110*   < >  107   SAMARA  9.0   < >  8.8   CO2  21   < >  23   BUN  23   < >  27   CR  1.25   < >  1.51*   GLC  79   < >  127*   AST   --    --   57*   ALT   --    --   58   BILITOTAL   --    --   1.3   ALBUMIN   --    --   3.1*   PROTTOTAL   --    --   6.5*   ALKPHOS   --    --   76    < > = values in this interval not displayed.            Procedures & Significant Findings:   Right Heart Catheterization, Coronary Angiogram and Myocardial Biopsy - 4/9/18  [facility]  Hemodynamic study: See the details on the report.   RA 21 RV74/30 PA 78/46/62 PCW 25  Carolyn CO 2.6, CI 1.5 TD CO 3.2, CI 1.8  PA sat 44.2% Hgb 13.4g/dl PVR 14.4 TPR 24.1  Summary:  1. Normal coronary angiogram  2. elevated PA pressue, mildly elevated RA, RV filling, and PCW pressures.  3. Reduced cardiac index.  4. Heart biopsy sent, result pending          Consultations:   None         Discharge Medications:     Discharge Medication List as of 4/15/2018  1:13 PM      START taking these medications    Details   hydrALAZINE (APRESOLINE) 100 MG TABS tablet Take 1 tablet (100 mg) by mouth 3 times daily, Disp-120 tablet, R-1, E-Prescribe      isosorbide mononitrate (IMDUR) 60 MG 24 hr tablet Take 1 tablet (60 mg) by mouth every morning, Disp-30 tablet, R-1, E-Prescribe         CONTINUE these medications which have CHANGED    Details   amLODIPine (NORVASC) 2.5 MG tablet Take 2 tablets (5 mg) by mouth At Bedtime, Disp-30 tablet, R-1, E-Prescribe      carvedilol (COREG) 3.125 MG tablet Take 2 tablets (6.25 mg) by mouth 2 times daily (with meals), Disp-60 tablet, R-1, E-Prescribe      losartan (COZAAR) 50 MG tablet Take 50mg every morning and 100mg every night, Disp-90 tablet, R-1, E-Prescribe          CONTINUE these medications which have NOT CHANGED    Details   benzonatate (TESSALON) 100 MG capsule Take 2 capsules (200 mg) by mouth 3 times daily as needed for cough, Disp-25 capsule, R-1, E-Prescribe         STOP taking these medications       furosemide (LASIX) 20 MG tablet Comments:   Reason for Stopping:         UNABLE TO FIND Comments:   Reason for Stopping:         potassium chloride SA (K-DUR/KLOR-CON M) 20 MEQ CR tablet Comments:   Reason for Stopping:         losartan-hydrochlorothiazide (HYZAAR) 50-12.5 MG per tablet Comments:   Reason for Stopping:                    Discharge Instructions and Follow-Up:     Discharge Procedure Orders  Pulmonary Medicine Referral     Tobacco Cessation Qic Referral     Follow-Up with Advanced Heart Failure Clinic   Standing Status: Future  Standing Exp. Date: 04/15/19     Follow-Up with [facility]   Standing Status: Future  Standing Exp. Date: 04/15/19     Reason for your hospital stay   Order Comments: Cardiogenic Shock  Heart Failure due to substance abuse with some component of familial contribution     Activity   Order Comments: Your activity upon discharge: activity as tolerated   Order Specific Question Answer Comments   Is discharge order? Yes      Monitor and record   Order Comments: weight every other day     Discharge Instructions   Order Comments: You were admitted with significant shortness of breath and dyspnea on exertion.     We believe that this is due to your heart not squeezing appropriately but also not filling with blood appropriately. It is important that you continue to take the medications we started you on appropriately to ensure that your heart function does not deteriorate further. In addition, we highly recommend you stop using methamphetamine and cigarettes.     You will need to follow up with your primary care doctor within 7-10 days, a heart failure specialist within 1 month, and we have placed a pulmonologist (lung specialist) referral  for you as well to address your concerns per your request and placed a referral for smoking cessation.  Please discuss options for chemical dependency with your primary care physician.     Adult [facility] Follow-up and recommended labs and tests   Order Comments: Follow up with primary care provider, Physician No Ref-Primary, within 7 days to evaluate medication change, to evaluate treatment change and regarding new diagnosis and hospital stay. We recommend repeat BMP and Mg    Please follow up with the advanced heart failure clinic; your cardiologist is Dr. Freitas. We will place a referral to ensure you are seen within 1-2 weeks by an NP in the [facility] and within 1-2 months by  your cardiologist.    Appointments on [place] and/or [facility] (with [facility] or [facility] provider or service). Call [phone] if you haven't heard regarding these appointments within 7 days of discharge.     Diet   Order Comments: Follow this diet upon discharge: Orders Placed This Encounter  Cardiac Diet     Fluid restriction 2000 ML FLUID     2 Gram Sodium Diet   Order Specific Question Answer Comments   Is discharge order? Yes                Discharge Disposition:   Home         Condition on Discharge:   Discharge condition: Fair   Code status on discharge: Full Code     Date of service: 4/15/2018    The patient was discussed with Dr.Garry Payton Johnson  IM PGY1        I have reviewed today's vital signs, notes, medications, labs and imaging.  I have also seen and examined the patient and agree with the findings and plan as outlined above.  Pt with meth abuse and familial cardiomyopathy presented with cardiogenic shock and EF 5-10%.  Pt placed on nipride therapy and transitioned to oral vasodilators.  Will d/c to home and follow up with Dr. Freitas.  Spent considerable amount of time discussing importance of remaining drug free and compliant behavior.  Total d/c time 50 min.     Tony Garcia MD,  PhD  Professor, Heart Failure and Cardiac Transplantation  [facility]

## 2018-04-15 NOTE — PLAN OF CARE
Problem: Patient Care Overview  Goal: Plan of Care/Patient Progress Review  Occupational Therapy Discharge Summary    Reason for therapy discharge:    Discharged to home.    Progress towards therapy goal(s). See goals on Care Plan in [facility] electronic health record for goal details.  Goals partially met.  Barriers to achieving goals:   discharge from facility.    Therapy recommendation(s):    Continued therapy is recommended.  Rationale/Recommendations:  recommend home safety eval..

## 2018-04-15 NOTE — PLAN OF CARE
Problem: Patient Care Overview  Goal: Plan of Care/Patient Progress Review  Outcome: Adequate for Discharge Date Met: 04/15/18  DISCHARGE   Discharged to: Home  Via: Automobile   Accompanied by: Significant other  Discharge Instructions: diet, activity, medications, follow up appointments, when to call the MD, and what to watchout for explained by nurse, questions answered.   Prescriptions: Filled by [facility] [place] pharmacy. Pt will  on the way down. Medication list reviewed & sent with pt  Follow Up Appointments: arranged; information given. Will follow up with provider if not contacted within 7 days of discharge.   Belongings: All sent with pt  IV: out  Telemetry: off  Pt exhibits understanding of above discharge instructions; all questions answered.  Discharge Paperwork: copy faxed

## 2018-04-15 NOTE — PLAN OF CARE
Problem: Patient Care Overview  Goal: Plan of Care/Patient Progress Review  Vss.  Monitor shows st.  Denies pain.  Voiding.  Hopes to dc today will monitor and notify md of changes or issues.

## 2018-04-15 NOTE — PLAN OF CARE
Problem: Patient Care Overview  Goal: Plan of Care/Patient Progress Review  Outcome: Improving  Pt transferred to 6D (right on the edge of 6C) @ 2115  D: Afebrile. VSS. SBP 120s, <160. A&O. SpO2 97% on room air.   A/I: Pt states has no heaviness, pain, trouble breathing, no new s/sx in chest. Reported off to oncoming RN to f/u with MDs about pt cough, education on cardiac rehab before discharge, and bactroban for MRSA. Pt states cough no different then before, but would like to follow up with MDs tomorrow. Pt able to stand and walk to commode with no increase in VS, difference in s/sx. Walked x2 outside room today well on day shift. Pt drank 1300 mL out of 2 L FR, reported to oncoming RN, reported to RN to get a weight tonight. Good urine output 1x in toilet pt walked to independently - unmeasured, reminded pt to use urinal for measurement.   P: Pt transferred to 6D. Plan to possible discharge in 1 day per MD note - refer to MD.

## 2018-04-15 NOTE — PROGRESS NOTES
"Transfer  Transferred from: 4C  Via: wheelchair  Reason for transfer: Pt appropriate for 6C- improved patient condition  Family: Aware of transfer, girlfriend \"Ta\" at bedside  Belongings: Sent with pt, at bedside, no valuables in hospital safe  Chart: Sent with pt  Medications: Meds from bin sent with pt  Report called from: 4C RN      Pt VSS, ST on tele, RA, no c/o pain. Pt up independently in room with SO at bedside, supportive and involved with cares. 2L FR. Pt has f/u questions for medical team to answer prior to d/c which may be tomorrow. Continue to monitor, follow poc, alert C2 MD's with changes and/or concerns.    "

## 2018-04-16 ENCOUNTER — CARE COORDINATION (OUTPATIENT)
Dept: CARE COORDINATION | Facility: CLINIC | Age: 38
End: 2018-04-16

## 2018-04-16 ENCOUNTER — CARE COORDINATION (OUTPATIENT)
Dept: CARDIOLOGY | Facility: CLINIC | Age: 38
End: 2018-04-16

## 2018-04-16 NOTE — PROGRESS NOTES
Patient was left message with return call back information by an RN for post DC follow up so no duplicate post DC follow up call will be made                Ronna Ahumada, RN        Call placed to see how patient is doing after leaving the hospital and to get a CORE heart failure appointment scheduled. No answer. Detailed VM left. Please call with any questions or concerns. [phone]

## 2018-04-16 NOTE — PROGRESS NOTES
Call placed to see how patient is doing after leaving the hospital and to get a CORE heart failure appointment scheduled. No answer. Detailed VM left. Please call with any questions or concerns. [phone]

## 2018-05-01 ENCOUNTER — COMMUNICATION - HEALTHEAST (OUTPATIENT)
Dept: TELEHEALTH | Facility: CLINIC | Age: 38
End: 2018-05-01

## 2018-05-01 ENCOUNTER — RECORDS - HEALTHEAST (OUTPATIENT)
Dept: GENERAL RADIOLOGY | Facility: CLINIC | Age: 38
End: 2018-05-01

## 2018-05-01 ENCOUNTER — OFFICE VISIT - HEALTHEAST (OUTPATIENT)
Dept: FAMILY MEDICINE | Facility: CLINIC | Age: 38
End: 2018-05-01

## 2018-05-01 DIAGNOSIS — I42.8 NONISCHEMIC CARDIOMYOPATHY (H): ICD-10-CM

## 2018-05-01 DIAGNOSIS — S22.32XA FRACTURE OF RIB OF LEFT SIDE: ICD-10-CM

## 2018-05-01 DIAGNOSIS — R07.81 RIB PAIN ON LEFT SIDE: ICD-10-CM

## 2018-05-01 DIAGNOSIS — R07.81 PLEURODYNIA: ICD-10-CM

## 2018-05-01 DIAGNOSIS — Z22.322 MRSA COLONIZATION: ICD-10-CM

## 2018-05-01 ASSESSMENT — MIFFLIN-ST. JEOR: SCORE: 1566.5

## 2018-05-04 ENCOUNTER — PRE VISIT (OUTPATIENT)
Dept: CARDIOLOGY | Facility: CLINIC | Age: 38
End: 2018-05-04

## 2018-05-04 DIAGNOSIS — I50.22 CHRONIC SYSTOLIC HEART FAILURE (H): Primary | ICD-10-CM

## 2018-05-08 ENCOUNTER — OFFICE VISIT (OUTPATIENT)
Dept: CARDIOLOGY | Facility: CLINIC | Age: 38
End: 2018-05-08
Attending: NURSE PRACTITIONER
Payer: COMMERCIAL

## 2018-05-08 VITALS
WEIGHT: 171.7 LBS | OXYGEN SATURATION: 97 % | DIASTOLIC BLOOD PRESSURE: 59 MMHG | HEART RATE: 97 BPM | SYSTOLIC BLOOD PRESSURE: 93 MMHG | BODY MASS INDEX: 30.42 KG/M2 | HEIGHT: 63 IN

## 2018-05-08 DIAGNOSIS — I50.43 ACUTE ON CHRONIC COMBINED SYSTOLIC AND DIASTOLIC CONGESTIVE HEART FAILURE (H): ICD-10-CM

## 2018-05-08 DIAGNOSIS — I50.82 BIVENTRICULAR HEART FAILURE (H): ICD-10-CM

## 2018-05-08 DIAGNOSIS — I10 BENIGN ESSENTIAL HYPERTENSION: ICD-10-CM

## 2018-05-08 DIAGNOSIS — I50.22 CHRONIC SYSTOLIC HEART FAILURE (H): ICD-10-CM

## 2018-05-08 DIAGNOSIS — I50.43 ACUTE ON CHRONIC COMBINED SYSTOLIC AND DIASTOLIC HEART FAILURE (H): ICD-10-CM

## 2018-05-08 LAB
ANION GAP SERPL CALCULATED.3IONS-SCNC: 7 MMOL/L (ref 3–14)
BUN SERPL-MCNC: 20 MG/DL (ref 7–30)
CALCIUM SERPL-MCNC: 8.9 MG/DL (ref 8.5–10.1)
CHLORIDE SERPL-SCNC: 108 MMOL/L (ref 94–109)
CO2 SERPL-SCNC: 26 MMOL/L (ref 20–32)
CREAT SERPL-MCNC: 1.62 MG/DL (ref 0.66–1.25)
GFR SERPL CREATININE-BSD FRML MDRD: 48 ML/MIN/1.7M2
GLUCOSE SERPL-MCNC: 94 MG/DL (ref 70–99)
NT-PROBNP SERPL-MCNC: 3640 PG/ML (ref 0–125)
POTASSIUM SERPL-SCNC: 4 MMOL/L (ref 3.4–5.3)
SODIUM SERPL-SCNC: 142 MMOL/L (ref 133–144)

## 2018-05-08 PROCEDURE — 99215 OFFICE O/P EST HI 40 MIN: CPT | Mod: ZP | Performed by: NURSE PRACTITIONER

## 2018-05-08 PROCEDURE — 83880 ASSAY OF NATRIURETIC PEPTIDE: CPT | Performed by: NURSE PRACTITIONER

## 2018-05-08 PROCEDURE — 80048 BASIC METABOLIC PNL TOTAL CA: CPT | Performed by: NURSE PRACTITIONER

## 2018-05-08 PROCEDURE — G0463 HOSPITAL OUTPT CLINIC VISIT: HCPCS | Mod: ZF

## 2018-05-08 PROCEDURE — 36415 COLL VENOUS BLD VENIPUNCTURE: CPT | Performed by: NURSE PRACTITIONER

## 2018-05-08 RX ORDER — AMLODIPINE BESYLATE 2.5 MG/1
2.5 TABLET ORAL AT BEDTIME
Qty: 30 TABLET | Refills: 1 | Status: SHIPPED | OUTPATIENT
Start: 2018-05-08 | End: 2018-07-11

## 2018-05-08 ASSESSMENT — MINNESOTA LIVING WITH HEART FAILURE QUESTIONNAIRE (MLHF)
GIVING YOU SIDE EFFECTS FROM TREATMENTS: 2
MAKING YOU SHORT OF BREATH: 2
DIFFICULTY WORKING TO EARN A LIVING: 3
FEELING LIKE A BURDEN TO FAMILY AND FRIENDS: 4
DIFFICULTY TO CONCENTRATE OR REMEMBERING THINGS: 3
EATING LESS FOODS YOU LIKE: 4
WORKING AROUND THE HOUSE OR YARD DIFFICULT: 3
MAKING YOU WORRY: 4
MAKING YOU FEEL DEPRESSED: 3
DIFFICULTY WITH SEXUAL ACTIVITIES: 3
DIFFICULTY WITH RECREATIONAL PASTIMES, SPORTS, HOBBIES: 5
SWELLING IN ANKLES OR LEGS: 1
MAKING YOU STAY IN A HOSPITAL: 1
HAVING TO SIT OR LIE DOWN DURING THE DAY: 3
TIRED, FATIGUED OR LOW ON ENERGY: 3
LOSS OF SELF CONTROL IN YOUR LIFE: 2
DIFFICULTY SOCIALIZING WITH FAMILY OR FRIENDS: 0
TOTAL_SCORE: 49
WALKING ABOUT OR CLIMBING STAIRS DIFFICULT: 2
COSTING YOU MONEY FOR MEDICAL CARE: 0
DIFFICULTY SLEEPING WELL AT NIGHT: 0
DIFFICULTY GOING AWAY FROM HOME: 1

## 2018-05-08 ASSESSMENT — ENCOUNTER SYMPTOMS
EXERCISE INTOLERANCE: 0
SLEEP DISTURBANCES DUE TO BREATHING: 0
INSOMNIA: 0
PANIC: 0
DEPRESSION: 1
PALPITATIONS: 1
NERVOUS/ANXIOUS: 0
ORTHOPNEA: 0
LEG PAIN: 0
HYPOTENSION: 1
SYNCOPE: 0
DECREASED CONCENTRATION: 0
LIGHT-HEADEDNESS: 1
HYPERTENSION: 1

## 2018-05-08 ASSESSMENT — PAIN SCALES - GENERAL: PAINLEVEL: NO PAIN (0)

## 2018-05-08 NOTE — PATIENT INSTRUCTIONS
"You were seen today in the Cardiovascular Clinic at the [facility].     Cardiology Providers you saw during your visit: Yoselin Victoria NP     1.  Please decrease your amlopdipine ot 2.5mg daily.   2.  Please make a follow-up CORE/heart failure appt in 1 week.  4pm Tuesday 5/15    Results for JAVI CASTELLANO (MRN [id number]) as of 2018 16:57   Ref. Range 2018 16:00   Sodium Latest Ref Range: 133 - 144 mmol/L 142   Potassium Latest Ref Range: 3.4 - 5.3 mmol/L 4.0   Chloride Latest Ref Range: 94 - 109 mmol/L 108   Carbon Dioxide Latest Ref Range: 20 - 32 mmol/L 26   Urea Nitrogen Latest Ref Range: 7 - 30 mg/dL 20   Creatinine Latest Ref Range: 0.66 - 1.25 mg/dL 1.62 (H)   GFR Estimate Latest Ref Range: >60 mL/min/1.7m2 48 (L)   GFR Estimate If Black Latest Ref Range: >60 mL/min/1.7m2 58 (L)   Calcium Latest Ref Range: 8.5 - 10.1 mg/dL 8.9   Anion Gap Latest Ref Range: 3 - 14 mmol/L 7   N-Terminal Pro Bnp Latest Ref Range: 0 - 125 pg/mL 3640 (H)   Glucose Latest Ref Range: 70 - 99 mg/dL 94       Please limit your fluid intake to 2 L (64 ounces) daily.  2 Liters a day = 8.5 cups, or 64 ounces.  Please limit your salt intake to 2 grams a day or less.     If you gain 2# in 24 hours or 5# in one week call Corina Cuellar RN so we can adjust your medications as needed over the phone.     Please feel free to call me with any questions or concerns.       Corina Cuellar RN BSN CHFN  [facility]  Cardiology Care Coordinator-Heart Failure Clinic     Questions and [phone].   First press #1 for the [employer] and then press #3 for \"Medical Questions\" to reach us Cardiology Nurses.      On Call Cardiologist for after hours or on weekends: [phone]   option #4 and ask to speak to the on-call Cardiologist. Inform them you are a CORE/heart failure patient at the [facility].           If you need a medication refill please contact your pharmacy.  Please allow 3 business days " for your refill to be completed.  _______________________________________________________  C.O.R.E. CLINIC Cardiomyopathy, Optimization, Rehabilitation, Education   The C.O.R.E. CLINIC is a heart failure specialty clinic within the [facility] where you will work with specialized nurse practitioners dedicated to helping patients with heart failure carefully adjust medications, receive education, and learn who and when to call if symptoms develop. They specialize in helping you better understand your condition, slow the progression of your disease, improve the length and quality of your life, help you detect future heart problems before they become life threatening, and avoid hospitalizations.  As always, thank you for trusting us with your health care needs!

## 2018-05-08 NOTE — LETTER
5/8/2018      RE: Palomo Patterson  [address]  [place] [address]       Dear Colleague,    Thank you for the opportunity to participate in the care of your patient, Palomo Patterson, at the [facility] HEART [facility]. Please see a copy of my visit note below.    HPI: 37 yr old male patient presents to CORE clinic for initial visit. He was recently hospitalized for ADHF due to meth use. He states he has felt well since discharge from the hospital. He has not used Meth since discharge. Pt denies SOB, orthopnea, PND, chest pain, belly bloating, loss of appetite, LE edema. Pt states energy level is good. We discussed how meth can cause heart failure, as he is not convinced that is the cause of his heart failure. We also discussed the importance of treatment for addiction and depression as he and his wife admit he has untreated depression. He is active playing soccer and his only sx is lightheadedness at times when he is playing.      PAST MEDICAL HISTORY:  Past Medical History:   Diagnosis Date     Hypertension        FAMILY HISTORY:  Family History   Problem Relation Age of Onset     Depression Mother      Anxiety Disorder Mother      Myocardial Infarction Mother      age of onset unknown     Myocardial Infarction Father      age of onset unknown     Cardiac Sudden Death Brother      had sudden cardiac death at 36       SOCIAL HISTORY:  Social History     Social History     Marital status: Single     Spouse name: N/A     Number of children: N/A     Years of education: N/A     Social History Main Topics     Smoking status: Current Some Day Smoker     Packs/day: 0.25     Types: Cigarettes     Smokeless tobacco: Never Used     Alcohol use No      Comment: social events only 1 drink 3-4 times per year     Drug use: 3.00 per week     Special: Methamphetamines      Comment: 1 gram 2-3 times per week     Sexual activity: Not Asked     Other Topics Concern     None     Social History  "Narrative       CURRENT MEDICATIONS:  Current Outpatient Prescriptions   Medication Sig Dispense Refill     amLODIPine (NORVASC) 2.5 MG tablet Take 1 tablet (2.5 mg) by mouth At Bedtime 30 tablet 1     carvedilol (COREG) 3.125 MG tablet Take 2 tablets (6.25 mg) by mouth 2 times daily (with meals) 60 tablet 1     hydrALAZINE (APRESOLINE) 100 MG TABS tablet Take 1 tablet (100 mg) by mouth 3 times daily 120 tablet 1     isosorbide mononitrate (IMDUR) 60 MG 24 hr tablet Take 1 tablet (60 mg) by mouth every morning 30 tablet 1     losartan (COZAAR) 50 MG tablet Take 50mg every morning and 100mg every night 90 tablet 1     benzonatate (TESSALON) 100 MG capsule Take 2 capsules (200 mg) by mouth 3 times daily as needed for cough (Patient not taking: Reported on 5/8/2018) 25 capsule 1     [DISCONTINUED] amLODIPine (NORVASC) 2.5 MG tablet Take 2 tablets (5 mg) by mouth At Bedtime 30 tablet 1       ROS:   Constitutional: No fever, chills, or sweats. No weight gain/loss.   ENT: No visual disturbance, ear ache, epistaxis, sore throat.   Allergies/Immunologic: Negative.   Respiratory: No cough, hemoptysis.   Cardiovascular: As per HPI.   GI: No nausea, vomiting, hematemesis, melena, or hematochezia.   : No urinary frequency, dysuria, or hematuria.   Integument: Negative.   Psychiatric: Negative.   Neuro: Negative.   Endocrinology: Negative.   Musculoskeletal: Negative.    EXAM:  BP 93/59 (BP Location: Left arm, Patient Position: Chair, Cuff Size: Adult Regular)  Pulse 97  Ht 1.6 m (5' 3\")  Wt 77.9 kg (171 lb 11.2 oz)  SpO2 97%  BMI 30.42 kg/m2  General: appears comfortable, alert and articulate  Head: normocephalic, atraumatic  Eyes: anicteric sclera, EOMI  Neck: no adenopathy  Orophyarynx: moist mucosa, no lesions, dentition intact  Heart: regular, S1/S2, no murmur, gallop, rub, estimated JVP 8cm  Lungs: clear, no rales or wheezing  Abdomen: soft, non-tender, bowel sounds present, no hepatosplenomegaly  Extremities: no " clubbing, cyanosis or edema  Neurological: normal speech and affect, no gross motor deficits    Labs:  CBC RESULTS:  Lab Results   Component Value Date    WBC 11.6 (H) 04/15/2018    RBC 5.39 04/15/2018    HGB 15.4 04/15/2018    HCT 46.8 04/15/2018    MCV 87 04/15/2018    MCH 28.6 04/15/2018    MCHC 32.9 04/15/2018    RDW 15.6 (H) 04/15/2018     04/15/2018       CMP RESULTS:  Lab Results   Component Value Date     05/08/2018    POTASSIUM 4.0 05/08/2018    CHLORIDE 108 05/08/2018    CO2 26 05/08/2018    ANIONGAP 7 05/08/2018    GLC 94 05/08/2018    BUN 20 05/08/2018    CR 1.62 (H) 05/08/2018    GFRESTIMATED 48 (L) 05/08/2018    GFRESTBLACK 58 (L) 05/08/2018    SAMARA 8.9 05/08/2018    BILITOTAL 1.3 04/05/2018    ALBUMIN 3.1 (L) 04/05/2018    ALKPHOS 76 04/05/2018    ALT 58 04/05/2018    AST 57 (H) 04/05/2018        INR RESULTS:  No results found for: INR    Lab Results   Component Value Date    MAG 1.9 04/15/2018     No results found for: NTBNPI  Lab Results   Component Value Date    NTBNP 3640 (H) 05/08/2018     Assessment and Plan:   1. 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    Stage C  NYHA Class II  ACEi/ARB yes  BB yes  Aldosterone antagonist no  SCD prophylaxis decision deferred during medication uptitration  % BiV pacing: N/A  Fluid status euvolemic  NSAID use: n/a    2. Depression: at this time patient and wife would like to try therapy prior to medication .     3. Meth use: long discussion re: treatment. Pt is in the process of getting approved for rule 25 for payment of such.    4. Increased Cr: suspect due to low BP - will cut back on his norvasc to 2.5mg daily and see pt for follow up in 1 week with BMP at that time.     Total time spent in consultation/exam and discussion of plan was 45 minutes  Follow-up 1 week    CC  Patient Care Team:  Corina Cuellar RN as Nurse Coordinator (Cardiology)    Answers for HPI/ROS submitted by the patient on  5/8/2018       Please do not hesitate to contact me if you have any questions/concerns.     Sincerely,     GARRETT Mata CNP

## 2018-05-08 NOTE — MR AVS SNAPSHOT
After Visit Summary   5/8/2018    Javi Patterson    MRN: [id number]           Patient Information     Date Of Birth          1980        Visit Information        Provider Department      5/8/2018 4:30 PM Yoselin Victoria APRN [facility] Care        Today's Diagnoses     Acute on chronic combined systolic and diastolic heart failure (H)        Acute on chronic combined systolic and diastolic congestive heart failure (H)        Biventricular heart failure        Benign essential hypertension          Care Instructions    You were seen today in the Cardiovascular Clinic at the [facility].     Cardiology Providers you saw during your visit: Yoselin Victoria NP     1.  Please decrease your amlopdipine ot 2.5mg daily.   2.  Please make a follow-up CORE/heart failure appt in 1 week.  4pm Tuesday 5/15    Results for JAVI PATTERSON (MRN [id number]) as of 5/8/2018 16:57   Ref. Range 5/8/2018 16:00   Sodium Latest Ref Range: 133 - 144 mmol/L 142   Potassium Latest Ref Range: 3.4 - 5.3 mmol/L 4.0   Chloride Latest Ref Range: 94 - 109 mmol/L 108   Carbon Dioxide Latest Ref Range: 20 - 32 mmol/L 26   Urea Nitrogen Latest Ref Range: 7 - 30 mg/dL 20   Creatinine Latest Ref Range: 0.66 - 1.25 mg/dL 1.62 (H)   GFR Estimate Latest Ref Range: >60 mL/min/1.7m2 48 (L)   GFR Estimate If Black Latest Ref Range: >60 mL/min/1.7m2 58 (L)   Calcium Latest Ref Range: 8.5 - 10.1 mg/dL 8.9   Anion Gap Latest Ref Range: 3 - 14 mmol/L 7   N-Terminal Pro Bnp Latest Ref Range: 0 - 125 pg/mL 3640 (H)   Glucose Latest Ref Range: 70 - 99 mg/dL 94       Please limit your fluid intake to 2 L (64 ounces) daily.  2 Liters a day = 8.5 cups, or 64 ounces.  Please limit your salt intake to 2 grams a day or less.     If you gain 2# in 24 hours or 5# in one week call Corina Cuellar RN so we can adjust your medications as needed over the phone.     Please feel free to call me with any questions or concerns.       Corina  "DEBORAH Cuellar BSN CHFN  [facility]  Cardiology Care Coordinator-Heart Failure Clinic     Questions and [phone].   First press #1 for the [employer] and then press #3 for \"Medical Questions\" to reach us Cardiology Nurses.      On Call Cardiologist for after hours or on weekends: [phone]   option #4 and ask to speak to the on-call Cardiologist. Inform them you are a CORE/heart failure patient at the [facility].           If you need a medication refill please contact your pharmacy.  Please allow 3 business days for your refill to be completed.  _______________________________________________________  C.O.R.E. CLINIC Cardiomyopathy, Optimization, Rehabilitation, Education   The C.O.R.E. CLINIC is a heart failure specialty clinic within the [facility] where you will work with specialized nurse practitioners dedicated to helping patients with heart failure carefully adjust medications, receive education, and learn who and when to call if symptoms develop. They specialize in helping you better understand your condition, slow the progression of your disease, improve the length and quality of your life, help you detect future heart problems before they become life threatening, and avoid hospitalizations.  As always, thank you for trusting us with your health care needs!           Follow-ups after your visit        Additional Services     Follow-Up with CORE Clinic       1 week with Yoselin Victoria.  4pm on 5/15.  With labs prior                  Future tests that were ordered for you today     Open Future Orders        Priority Expected Expires Ordered    Follow-Up with CORE Clinic Routine 5/15/2018 2018 2018            Who to contact     If you have questions or need follow up information about today's clinic visit or your schedule please contact [facility] directly at [phone].  Normal or non-critical lab and imaging results " "will be communicated to you by MyChart, letter or phone within 4 business days after the clinic has received the results. If you do not hear from us within 7 days, please contact the clinic through [employer] or phone. If you have a critical or abnormal lab result, we will notify you by phone as soon as possible.  Submit refill requests through [employer] or call your pharmacy and they will forward the refill request to us. Please allow 3 business days for your refill to be completed.          Additional Information About Your Visit        [employer] Information     [employer] lets you send messages to your doctor, view your test results, renew your prescriptions, schedule appointments and more. To sign up, go to [email or web address] . Click on \"Log in\" on the left side of the screen, which will take you to the Welcome page. Then click on \"Sign up Now\" on the right side of the page.     You will be asked to enter the access code listed below, as well as some personal information. Please follow the directions to create your username and password.     Your access code is: [email or web address]  Expires: 2018  1:48 PM     Your access code will  in 90 days. If you need help or a new code, please call your [place] clinic or [phone].        Care EveryWhere ID     This is your Care EveryWhere ID. This could be used by other organizations to access your [place] medical records  [id number]        Your Vitals Were     Pulse Height Pulse Oximetry BMI (Body Mass Index)          97 1.6 m (5' 3\") 97% 30.42 kg/m2         Blood Pressure from Last 3 Encounters:   18 93/59   04/15/18 103/75    Weight from Last 3 Encounters:   18 77.9 kg (171 lb 11.2 oz)   04/15/18 73.3 kg (161 lb 9.6 oz)   18 78.6 kg (173 lb 3.2 oz)                 Today's Medication Changes          These changes are accurate as of 18  5:57 PM.  If you have any questions, ask your nurse or doctor.               These medicines have changed or " have updated prescriptions.        Dose/Directions    amLODIPine 2.5 MG tablet   Commonly known as:  NORVASC   This may have changed:  how much to take   Used for:  Acute on chronic combined systolic and diastolic heart failure (H), Acute on chronic combined systolic and diastolic congestive heart failure (H), Biventricular heart failure, Benign essential hypertension   Changed by:  Yoselin Victoria APRN CNP        Dose:  2.5 mg   Take 1 tablet (2.5 mg) by mouth At Bedtime   Quantity:  30 tablet   Refills:  1            Where to get your medicines      These medications were sent to [facility]/pharmacy #[id number] - [place] [id number] [place] [address]     Phone:  [phone]     amLODIPine 2.5 MG tablet                Primary Care Provider Fax #    Physician No Ref-Primary [phone]       No address on file        Equal Access to Services     RAMIREZ KOVACS : Radha King, waaxtootie lurikki, qaybartemio kaalmatootie hardin, [place] . So [facility] [phone].    ATENCIÓN: Si habla español, tiene a villavicencio disposición servicios gratuitos de asistencia lingüística. [facility] [phone].    We comply with applicable federal civil rights laws and [place] laws. We do not discriminate on the basis of race, color, national origin, age, disability, sex, sexual orientation, or gender identity.            Thank you!     Thank you for choosing [facility]  for your care. Our goal is always to provide you with excellent care. Hearing back from our patients is one way we can continue to improve our services. Please take a few minutes to complete the written survey that you may receive in the mail after your visit with us. Thank you!             Your Updated Medication List - Protect others around you: Learn how to safely use, store and throw away your medicines at www.disposemymeds.org.          This list is accurate as of 5/8/18  5:57 PM.  Always use  your most recent med list.                   Brand Name Dispense Instructions for use Diagnosis    amLODIPine 2.5 MG tablet    NORVASC    30 tablet    Take 1 tablet (2.5 mg) by mouth At Bedtime    Acute on chronic combined systolic and diastolic heart failure (H), Acute on chronic combined systolic and diastolic congestive heart failure (H), Biventricular heart failure, Benign essential hypertension       benzonatate 100 MG capsule    TESSALON    25 capsule    Take 2 capsules (200 mg) by mouth 3 times daily as needed for cough    Acute on chronic combined systolic and diastolic congestive heart failure (H)       carvedilol 3.125 MG tablet    COREG    60 tablet    Take 2 tablets (6.25 mg) by mouth 2 times daily (with meals)    Acute on chronic combined systolic and diastolic congestive heart failure (H)       hydrALAZINE 100 MG Tabs tablet    APRESOLINE    120 tablet    Take 1 tablet (100 mg) by mouth 3 times daily    Acute on chronic combined systolic and diastolic heart failure (H), Biventricular heart failure       isosorbide mononitrate 60 MG 24 hr tablet    IMDUR    30 tablet    Take 1 tablet (60 mg) by mouth every morning    Acute on chronic combined systolic and diastolic congestive heart failure (H), Acute on chronic combined systolic and diastolic heart failure (H), Benign essential hypertension       losartan 50 MG tablet    COZAAR    90 tablet    Take 50mg every morning and 100mg every night    Acute on chronic combined systolic and diastolic congestive heart failure (H)

## 2018-05-08 NOTE — NURSING NOTE
Chief Complaint   Patient presents with     New Patient     New CORE; 37yr old male with a h/o chronic biventricular heart failure presenting post hospitalization for HF follow up with labs prior     Vitals were taken and medications were reconciled.     Kandace Escamilla MA    4:19 PM

## 2018-05-08 NOTE — PROGRESS NOTES
HPI: 37 yr old male patient presents to [facility] for initial visit. He was recently hospitalized for ADHF due to meth use. He states he has felt well since discharge from the hospital. He has not used Meth since discharge. Pt denies SOB, orthopnea, PND, chest pain, belly bloating, loss of appetite, LE edema. Pt states energy level is good. We discussed how meth can cause heart failure, as he is not convinced that is the cause of his heart failure. We also discussed the importance of treatment for addiction and depression as he and his wife admit he has untreated depression. He is active playing soccer and his only sx is lightheadedness at times when he is playing.      PAST MEDICAL HISTORY:  Past Medical History:   Diagnosis Date     Hypertension        FAMILY HISTORY:  Family History   Problem Relation Age of Onset     Depression Mother      Anxiety Disorder Mother      Myocardial Infarction Mother      age of onset unknown     Myocardial Infarction Father      age of onset unknown     Cardiac Sudden Death Brother      had sudden cardiac death at 36       SOCIAL HISTORY:  Social History     Social History     Marital status: Single     Spouse name: N/A     Number of children: N/A     Years of education: N/A     Social History Main Topics     Smoking status: Current Some Day Smoker     Packs/day: 0.25     Types: Cigarettes     Smokeless tobacco: Never Used     Alcohol use No      Comment: social events only 1 drink 3-4 times per year     Drug use: 3.00 per week     Special: Methamphetamines      Comment: 1 gram 2-3 times per week     Sexual activity: Not Asked     Other Topics Concern     None     Social History Narrative       CURRENT MEDICATIONS:  Current Outpatient Prescriptions   Medication Sig Dispense Refill     amLODIPine (NORVASC) 2.5 MG tablet Take 1 tablet (2.5 mg) by mouth At Bedtime 30 tablet 1     carvedilol (COREG) 3.125 MG tablet Take 2 tablets (6.25 mg) by mouth 2 times daily (with meals) 60  "tablet 1     hydrALAZINE (APRESOLINE) 100 MG TABS tablet Take 1 tablet (100 mg) by mouth 3 times daily 120 tablet 1     isosorbide mononitrate (IMDUR) 60 MG 24 hr tablet Take 1 tablet (60 mg) by mouth every morning 30 tablet 1     losartan (COZAAR) 50 MG tablet Take 50mg every morning and 100mg every night 90 tablet 1     benzonatate (TESSALON) 100 MG capsule Take 2 capsules (200 mg) by mouth 3 times daily as needed for cough (Patient not taking: Reported on 5/8/2018) 25 capsule 1     [DISCONTINUED] amLODIPine (NORVASC) 2.5 MG tablet Take 2 tablets (5 mg) by mouth At Bedtime 30 tablet 1       ROS:   Constitutional: No fever, chills, or sweats. No weight gain/loss.   ENT: No visual disturbance, ear ache, epistaxis, sore throat.   Allergies/Immunologic: Negative.   Respiratory: No cough, hemoptysis.   Cardiovascular: As per HPI.   GI: No nausea, vomiting, hematemesis, melena, or hematochezia.   : No urinary frequency, dysuria, or hematuria.   Integument: Negative.   Psychiatric: Negative.   Neuro: Negative.   Endocrinology: Negative.   Musculoskeletal: Negative.    EXAM:  BP 93/59 (BP Location: Left arm, Patient Position: Chair, Cuff Size: Adult Regular)  Pulse 97  Ht 1.6 m (5' 3\")  Wt 77.9 kg (171 lb 11.2 oz)  SpO2 97%  BMI 30.42 kg/m2  General: appears comfortable, alert and articulate  Head: normocephalic, atraumatic  Eyes: anicteric sclera, EOMI  Neck: no adenopathy  Orophyarynx: moist mucosa, no lesions, dentition intact  Heart: regular, S1/S2, no murmur, gallop, rub, estimated JVP 8cm  Lungs: clear, no rales or wheezing  Abdomen: soft, non-tender, bowel sounds present, no hepatosplenomegaly  Extremities: no clubbing, cyanosis or edema  Neurological: normal speech and affect, no gross motor deficits    Labs:  CBC RESULTS:  Lab Results   Component Value Date    WBC 11.6 (H) 04/15/2018    RBC 5.39 04/15/2018    HGB 15.4 04/15/2018    HCT 46.8 04/15/2018    MCV 87 04/15/2018    MCH 28.6 04/15/2018    MCHC " 32.9 04/15/2018    RDW 15.6 (H) 04/15/2018     04/15/2018       CMP RESULTS:  Lab Results   Component Value Date     05/08/2018    POTASSIUM 4.0 05/08/2018    CHLORIDE 108 05/08/2018    CO2 26 05/08/2018    ANIONGAP 7 05/08/2018    GLC 94 05/08/2018    BUN 20 05/08/2018    CR 1.62 (H) 05/08/2018    GFRESTIMATED 48 (L) 05/08/2018    GFRESTBLACK 58 (L) 05/08/2018    SAMARA 8.9 05/08/2018    BILITOTAL 1.3 04/05/2018    ALBUMIN 3.1 (L) 04/05/2018    ALKPHOS 76 04/05/2018    ALT 58 04/05/2018    AST 57 (H) 04/05/2018        INR RESULTS:  No results found for: INR    Lab Results   Component Value Date    MAG 1.9 04/15/2018     No results found for: NTBNPI  Lab Results   Component Value Date    NTBNP 3640 (H) 05/08/2018       Assessment and Plan:   1. 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    Stage C  NYHA Class II  ACEi/ARB yes  BB yes  Aldosterone antagonist no  SCD prophylaxis decision deferred during medication uptitration  % BiV pacing: N/A  Fluid status euvolemic  NSAID use: n/a    2. Depression: at this time patient and wife would like to try therapy prior to medication .     3. Meth use: long discussion re: treatment. Pt is in the process of getting approved for rule 25 for payment of such.    4. Increased Cr: suspect due to low BP - will cut back on his norvasc to 2.5mg daily and see pt for follow up in 1 week with BMP at that time.     Total time spent in consultation/exam and discussion of plan was 45 minutes  Follow-up 1 week    CC  Patient Care Team:  No Ref-Primary, Physician as PCP - Corina Tavares, RN as Nurse Coordinator (Cardiology)  Enedina Chirinos, APRN CNP as Nurse Practitioner (Cardiology)  ENEDINA CHIRINOS    Answers for HPI/ROS submitted by the patient on 5/8/2018   General Symptoms: No  Skin Symptoms: No  HENT Symptoms: No  EYE SYMPTOMS: No  HEART SYMPTOMS: Yes  LUNG SYMPTOMS: No  INTESTINAL SYMPTOMS: No  URINARY SYMPTOMS:  No  REPRODUCTIVE SYMPTOMS: No  SKELETAL SYMPTOMS: No  BLOOD SYMPTOMS: No  NERVOUS SYSTEM SYMPTOMS: No  MENTAL HEALTH SYMPTOMS: Yes  Chest pain or pressure: No  Fast or irregular heartbeat: Yes  Pain in legs with walking: No  Trouble breathing while lying down: No  Fingers or toes appear blue: No  High blood pressure: Yes  Low blood pressure: Yes  Fainting: No  Murmurs: No  Pacemaker: No  Varicose veins: No  Edema or swelling: No  Wake up at night with shortness of breath: No  Light-headedness: Yes  Exercise intolerance: No  Nervous or Anxious: No  Depression: Yes  Trouble sleeping: No  Trouble thinking or concentrating: No  Mood changes: Yes  Panic attacks: No

## 2018-05-11 ENCOUNTER — OFFICE VISIT - HEALTHEAST (OUTPATIENT)
Dept: FAMILY MEDICINE | Facility: CLINIC | Age: 38
End: 2018-05-11

## 2018-05-11 DIAGNOSIS — L02.91 ABSCESS: ICD-10-CM

## 2018-05-11 DIAGNOSIS — Z22.322 MRSA COLONIZATION: ICD-10-CM

## 2018-05-13 LAB — BACTERIA SPEC CULT: ABNORMAL

## 2018-05-14 ENCOUNTER — PRE VISIT (OUTPATIENT)
Dept: CARDIOLOGY | Facility: CLINIC | Age: 38
End: 2018-05-14

## 2018-05-14 DIAGNOSIS — I50.82 BIVENTRICULAR HEART FAILURE (H): Primary | ICD-10-CM

## 2018-05-15 ENCOUNTER — COMMUNICATION - HEALTHEAST (OUTPATIENT)
Dept: FAMILY MEDICINE | Facility: CLINIC | Age: 38
End: 2018-05-15

## 2018-05-22 ENCOUNTER — COMMUNICATION - HEALTHEAST (OUTPATIENT)
Dept: FAMILY MEDICINE | Facility: CLINIC | Age: 38
End: 2018-05-22

## 2018-05-23 ENCOUNTER — PRE VISIT (OUTPATIENT)
Dept: CARDIOLOGY | Facility: CLINIC | Age: 38
End: 2018-05-23

## 2018-05-23 DIAGNOSIS — I50.82 BIVENTRICULAR HEART FAILURE (H): Primary | ICD-10-CM

## 2018-05-30 ENCOUNTER — OFFICE VISIT (OUTPATIENT)
Dept: CARDIOLOGY | Facility: CLINIC | Age: 38
End: 2018-05-30
Attending: NURSE PRACTITIONER
Payer: COMMERCIAL

## 2018-05-30 VITALS
OXYGEN SATURATION: 97 % | BODY MASS INDEX: 30.09 KG/M2 | SYSTOLIC BLOOD PRESSURE: 121 MMHG | HEIGHT: 63 IN | WEIGHT: 169.8 LBS | HEART RATE: 92 BPM | DIASTOLIC BLOOD PRESSURE: 82 MMHG

## 2018-05-30 DIAGNOSIS — I50.43 ACUTE ON CHRONIC COMBINED SYSTOLIC AND DIASTOLIC CONGESTIVE HEART FAILURE (H): ICD-10-CM

## 2018-05-30 DIAGNOSIS — I50.43 ACUTE ON CHRONIC COMBINED SYSTOLIC AND DIASTOLIC HEART FAILURE (H): ICD-10-CM

## 2018-05-30 DIAGNOSIS — I50.82 BIVENTRICULAR HEART FAILURE (H): ICD-10-CM

## 2018-05-30 LAB
ANION GAP SERPL CALCULATED.3IONS-SCNC: 9 MMOL/L (ref 3–14)
BUN SERPL-MCNC: 18 MG/DL (ref 7–30)
CALCIUM SERPL-MCNC: 8.9 MG/DL (ref 8.5–10.1)
CHLORIDE SERPL-SCNC: 108 MMOL/L (ref 94–109)
CO2 SERPL-SCNC: 24 MMOL/L (ref 20–32)
CREAT SERPL-MCNC: 1.48 MG/DL (ref 0.66–1.25)
GFR SERPL CREATININE-BSD FRML MDRD: 54 ML/MIN/1.7M2
GLUCOSE SERPL-MCNC: 123 MG/DL (ref 70–99)
POTASSIUM SERPL-SCNC: 4.2 MMOL/L (ref 3.4–5.3)
SODIUM SERPL-SCNC: 141 MMOL/L (ref 133–144)

## 2018-05-30 PROCEDURE — G0463 HOSPITAL OUTPT CLINIC VISIT: HCPCS | Mod: ZF

## 2018-05-30 PROCEDURE — 80048 BASIC METABOLIC PNL TOTAL CA: CPT | Performed by: NURSE PRACTITIONER

## 2018-05-30 PROCEDURE — 99214 OFFICE O/P EST MOD 30 MIN: CPT | Mod: ZP | Performed by: NURSE PRACTITIONER

## 2018-05-30 PROCEDURE — 36415 COLL VENOUS BLD VENIPUNCTURE: CPT | Performed by: NURSE PRACTITIONER

## 2018-05-30 RX ORDER — CARVEDILOL 3.12 MG/1
TABLET ORAL
Qty: 60 TABLET | Refills: 1 | COMMUNITY
Start: 2018-05-30 | End: 2018-05-30

## 2018-05-30 RX ORDER — CARVEDILOL 3.12 MG/1
TABLET ORAL
Qty: 60 TABLET | Refills: 1 | Status: SHIPPED | OUTPATIENT
Start: 2018-05-30 | End: 2018-06-21

## 2018-05-30 ASSESSMENT — PAIN SCALES - GENERAL: PAINLEVEL: NO PAIN (0)

## 2018-05-30 NOTE — LETTER
5/30/2018      RE: Palomo Patterson  [address]  [place] [address]       Dear Colleague,    Thank you for the opportunity to participate in the care of your patient, Palomo Patterson, at the [facility]. Please see a copy of my visit note below.    HPI: 37 yr old male patient returns to [facility] for follow up. 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 He unfortunately started using meth again, stating he is using it 3x/week. He is also using tobacco 5 cigarettes/day. He is watching the sodium in his diet, avoiding high sodium foods. He started with a cough 2 weeks ago that comes and goes. It is not associated with exertion, he denies PND, orthopnea, chest pressure.    PAST MEDICAL HISTORY:  Past Medical History:   Diagnosis Date     Hypertension        FAMILY HISTORY:  Family History   Problem Relation Age of Onset     Depression Mother      Anxiety Disorder Mother      Myocardial Infarction Mother      age of onset unknown     Myocardial Infarction Father      age of onset unknown     Cardiac Sudden Death Brother      had sudden cardiac death at 36       SOCIAL HISTORY:  Social History     Social History     Marital status: Single     Spouse name: N/A     Number of children: N/A     Years of education: N/A     Social History Main Topics     Smoking status: Current Some Day Smoker     Packs/day: 0.25     Types: Cigarettes     Smokeless tobacco: Never Used     Alcohol use No      Comment: social events only 1 drink 3-4 times per year     Drug use: 3.00 per week     Special: Methamphetamines      Comment: 1 gram 2-3 times per week     Sexual activity: Not Asked     Other Topics Concern     None     Social History Narrative       CURRENT MEDICATIONS:  Current Outpatient Prescriptions   Medication Sig Dispense Refill     amLODIPine (NORVASC) 2.5 MG tablet Take 1 tablet (2.5 mg) by mouth At Bedtime 30 tablet 1     benzonatate (TESSALON) 100  "MG capsule Take 2 capsules (200 mg) by mouth 3 times daily as needed for cough (Patient not taking: Reported on 5/8/2018) 25 capsule 1     carvedilol (COREG) 3.125 MG tablet Take 2 tablets (6.25 mg) by mouth 2 times daily (with meals) 60 tablet 1     hydrALAZINE (APRESOLINE) 100 MG TABS tablet Take 1 tablet (100 mg) by mouth 3 times daily 120 tablet 1     isosorbide mononitrate (IMDUR) 60 MG 24 hr tablet Take 1 tablet (60 mg) by mouth every morning 30 tablet 1     losartan (COZAAR) 50 MG tablet Take 50mg every morning and 100mg every night 90 tablet 1       ROS:   Constitutional: No fever, chills, or sweats. No weight gain/loss.   ENT: No visual disturbance, ear ache, epistaxis, sore throat.   Allergies/Immunologic: Negative.   Respiratory: No cough, hemoptysis.   Cardiovascular: As per HPI.   GI: No nausea, vomiting, hematemesis, melena, or hematochezia.   : No urinary frequency, dysuria, or hematuria.   Integument: Negative.   Psychiatric: Negative.   Neuro: Negative.   Endocrinology: Negative.   Musculoskeletal: Negative.    EXAM:  /82 (BP Location: Left arm, Cuff Size: Adult Regular)  Pulse 92  Ht 1.6 m (5' 3\")  Wt 77 kg (169 lb 12.8 oz)  SpO2 97%  BMI 30.08 kg/m2  General: appears comfortable, alert and articulate  Head: normocephalic, atraumatic  Eyes: anicteric sclera, EOMI  Neck: no adenopathy  Orophyarynx: moist mucosa, no lesions, dentition intact  Heart: regular, S1/S2, no murmur, gallop, rub, estimated JVP 8cm  Lungs: clear, no rales or wheezing  Abdomen: soft, non-tender, bowel sounds present, no hepatosplenomegaly  Extremities: no clubbing, cyanosis or edema  Neurological: normal speech and affect, no gross motor deficits    Labs:  CBC RESULTS:  Lab Results   Component Value Date    WBC 11.6 (H) 04/15/2018    RBC 5.39 04/15/2018    HGB 15.4 04/15/2018    HCT 46.8 04/15/2018    MCV 87 04/15/2018    MCH 28.6 04/15/2018    MCHC 32.9 04/15/2018    RDW 15.6 (H) 04/15/2018     04/15/2018 "       CMP RESULTS:  Lab Results   Component Value Date     05/08/2018    POTASSIUM 4.0 05/08/2018    CHLORIDE 108 05/08/2018    CO2 26 05/08/2018    ANIONGAP 7 05/08/2018    GLC 94 05/08/2018    BUN 20 05/08/2018    CR 1.62 (H) 05/08/2018    GFRESTIMATED 48 (L) 05/08/2018    GFRESTBLACK 58 (L) 05/08/2018    SAMARA 8.9 05/08/2018    BILITOTAL 1.3 04/05/2018    ALBUMIN 3.1 (L) 04/05/2018    ALKPHOS 76 04/05/2018    ALT 58 04/05/2018    AST 57 (H) 04/05/2018        INR RESULTS:  No results found for: INR    Lab Results   Component Value Date    MAG 1.9 04/15/2018     No results found for: NTBNPI  Lab Results   Component Value Date    NTBNP 3640 (H) 05/08/2018       Assessment and Plan:   1. 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    Stage C  NYHA Class II  ACEi/ARB yes  BB titration ongoing - coreg dose increased to 9.375 in pm; continue 6.25mg in am.  Aldosterone antagonist deferred while other medical therapy is prioritized  SCD prophylaxis decision deferred during medication uptitration  % BiV pacing: N/A  Fluid status euvolemic      2. Meth use: discussed treatment - pt states he will go for his assessment.    3. Ongoing tobacco use: discussed cessation - however, his meth use is the bigger concern at this time.     Follow-up in 2 weeks.     CC  Patient Care Team:  No Ref-Primary, Physician as PCP - Corina Tavares, RN as Nurse Coordinator (Cardiology)  Cuca Flores RN as Nurse Coordinator (Cardiology)

## 2018-05-30 NOTE — PROGRESS NOTES
HPI: 37 yr old male patient returns to [facility] for follow up. 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 He unfortunately started using meth again, stating he is using it 3x/week. He is also using tobacco 5 cigarettes/day. He is watching the sodium in his diet, avoiding high sodium foods. He started with a cough 2 weeks ago that comes and goes. It is not associated with exertion, he denies PND, orthopnea, chest pressure.    PAST MEDICAL HISTORY:  Past Medical History:   Diagnosis Date     Hypertension        FAMILY HISTORY:  Family History   Problem Relation Age of Onset     Depression Mother      Anxiety Disorder Mother      Myocardial Infarction Mother      age of onset unknown     Myocardial Infarction Father      age of onset unknown     Cardiac Sudden Death Brother      had sudden cardiac death at 36       SOCIAL HISTORY:  Social History     Social History     Marital status: Single     Spouse name: N/A     Number of children: N/A     Years of education: N/A     Social History Main Topics     Smoking status: Current Some Day Smoker     Packs/day: 0.25     Types: Cigarettes     Smokeless tobacco: Never Used     Alcohol use No      Comment: social events only 1 drink 3-4 times per year     Drug use: 3.00 per week     Special: Methamphetamines      Comment: 1 gram 2-3 times per week     Sexual activity: Not Asked     Other Topics Concern     None     Social History Narrative       CURRENT MEDICATIONS:  Current Outpatient Prescriptions   Medication Sig Dispense Refill     amLODIPine (NORVASC) 2.5 MG tablet Take 1 tablet (2.5 mg) by mouth At Bedtime 30 tablet 1     benzonatate (TESSALON) 100 MG capsule Take 2 capsules (200 mg) by mouth 3 times daily as needed for cough (Patient not taking: Reported on 5/8/2018) 25 capsule 1     carvedilol (COREG) 3.125 MG tablet Take 2 tablets (6.25 mg) by mouth 2 times daily (with meals) 60 tablet 1     hydrALAZINE (APRESOLINE) 100 MG TABS tablet Take 1  "tablet (100 mg) by mouth 3 times daily 120 tablet 1     isosorbide mononitrate (IMDUR) 60 MG 24 hr tablet Take 1 tablet (60 mg) by mouth every morning 30 tablet 1     losartan (COZAAR) 50 MG tablet Take 50mg every morning and 100mg every night 90 tablet 1       ROS:   Constitutional: No fever, chills, or sweats. No weight gain/loss.   ENT: No visual disturbance, ear ache, epistaxis, sore throat.   Allergies/Immunologic: Negative.   Respiratory: No cough, hemoptysis.   Cardiovascular: As per HPI.   GI: No nausea, vomiting, hematemesis, melena, or hematochezia.   : No urinary frequency, dysuria, or hematuria.   Integument: Negative.   Psychiatric: Negative.   Neuro: Negative.   Endocrinology: Negative.   Musculoskeletal: Negative.    EXAM:  /82 (BP Location: Left arm, Cuff Size: Adult Regular)  Pulse 92  Ht 1.6 m (5' 3\")  Wt 77 kg (169 lb 12.8 oz)  SpO2 97%  BMI 30.08 kg/m2  General: appears comfortable, alert and articulate  Head: normocephalic, atraumatic  Eyes: anicteric sclera, EOMI  Neck: no adenopathy  Orophyarynx: moist mucosa, no lesions, dentition intact  Heart: regular, S1/S2, no murmur, gallop, rub, estimated JVP 8cm  Lungs: clear, no rales or wheezing  Abdomen: soft, non-tender, bowel sounds present, no hepatosplenomegaly  Extremities: no clubbing, cyanosis or edema  Neurological: normal speech and affect, no gross motor deficits    Labs:  CBC RESULTS:  Lab Results   Component Value Date    WBC 11.6 (H) 04/15/2018    RBC 5.39 04/15/2018    HGB 15.4 04/15/2018    HCT 46.8 04/15/2018    MCV 87 04/15/2018    MCH 28.6 04/15/2018    MCHC 32.9 04/15/2018    RDW 15.6 (H) 04/15/2018     04/15/2018       CMP RESULTS:  Lab Results   Component Value Date     05/08/2018    POTASSIUM 4.0 05/08/2018    CHLORIDE 108 05/08/2018    CO2 26 05/08/2018    ANIONGAP 7 05/08/2018    GLC 94 05/08/2018    BUN 20 05/08/2018    CR 1.62 (H) 05/08/2018    GFRESTIMATED 48 (L) 05/08/2018    GFRESTBLACK 58 (L) " 05/08/2018    SAMARA 8.9 05/08/2018    BILITOTAL 1.3 04/05/2018    ALBUMIN 3.1 (L) 04/05/2018    ALKPHOS 76 04/05/2018    ALT 58 04/05/2018    AST 57 (H) 04/05/2018        INR RESULTS:  No results found for: INR    Lab Results   Component Value Date    MAG 1.9 04/15/2018     No results found for: NTBNPI  Lab Results   Component Value Date    NTBNP 3640 (H) 05/08/2018       Assessment and Plan:   1. 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    Stage C  NYHA Class II  ACEi/ARB yes  BB titration ongoing - coreg dose increased to 9.375 in pm; continue 6.25mg in am.  Aldosterone antagonist deferred while other medical therapy is prioritized  SCD prophylaxis decision deferred during medication uptitration  % BiV pacing: N/A  Fluid status euvolemic      2. Meth use: discussed treatment - pt states he will go for his assessment.    3. Ongoing tobacco use: discussed cessation - however, his meth use is the bigger concern at this time.     Follow-up in 2 weeks.     CC  Patient Care Team:  No Ref-Primary, Physician as PCP - Corina Tavares, RN as Nurse Coordinator (Cardiology)  Enedina Chirinos APRN CNP as Nurse Practitioner (Cardiology)  Cuca Flores RN as Nurse Coordinator (Cardiology)  ENEDINA CHIRINOS

## 2018-05-30 NOTE — NURSING NOTE
Chief Complaint   Patient presents with     Follow Up For     Return [facility]; 37yr old male with a h/o chronic biventricular heart failure presenting post hospitalization for HF follow up with labs prior.     Vitals were taken and medications were reconciled.     CARLA Travis  1:06 PM

## 2018-05-30 NOTE — MR AVS SNAPSHOT
After Visit Summary   5/30/2018    Javi Patterson    MRN: [id number]           Patient Information     Date Of Birth          1980        Visit Information        Provider Department      5/30/2018 1:00 PM Yoselin Victoria APRN [facility]        Today's Diagnoses     Acute on chronic combined systolic and diastolic heart failure (H)        Acute on chronic combined systolic and diastolic congestive heart failure (H)          Care Instructions    You were seen today in the Cardiovascular Clinic at the [facility].     Cardiology Providers you saw during your visit: Yoselin Victoria NP     Follow up and medication changes:    1. Increase Coreg to 2 tabs in the morning, 3 tabs in the evening.   2. Return to clinic in 2 weeks.       Results for JAVI PATTERSON (MRN [id number]) as of 5/30/2018 13:39   Ref. Range 5/30/2018 12:53   Sodium Latest Ref Range: 133 - 144 mmol/L 141   Potassium Latest Ref Range: 3.4 - 5.3 mmol/L 4.2   Chloride Latest Ref Range: 94 - 109 mmol/L 108   Carbon Dioxide Latest Ref Range: 20 - 32 mmol/L 24   Urea Nitrogen Latest Ref Range: 7 - 30 mg/dL 18   Creatinine Latest Ref Range: 0.66 - 1.25 mg/dL 1.48 (H)   GFR Estimate Latest Ref Range: >60 mL/min/1.7m2 54 (L)   GFR Estimate If Black Latest Ref Range: >60 mL/min/1.7m2 65   Calcium Latest Ref Range: 8.5 - 10.1 mg/dL 8.9   Anion Gap Latest Ref Range: 3 - 14 mmol/L 9   Glucose Latest Ref Range: 70 - 99 mg/dL 123 (H)         Please limit your fluid intake to 2 L (68 ounces) daily.  2 Liters a day = 8.5 cups, or 68 ounces.  Please limit your salt intake to 2 grams a day or less.     If you gain 2# in 24 hours or 5# in one week call Cuca Flores RN so we can adjust your medications as needed over the phone.     Please feel free to call me with any questions or concerns.       Cuca Flores RN  [facility]  Cardiology Care Coordinator-Heart Failure Clinic     Questions and scheduling:  "[phone].   First press #1 for the University and then press #3 for \"Medical Questions\" to reach us Cardiology Nurses.      On Call Cardiologist for after hours or on weekends: [phone]   option #4 and ask to speak to the on-call Cardiologist. Inform them you are a CORE/heart failure patient at the [facility].           If you need a medication refill please contact your pharmacy.  Please allow 3 business days for your refill to be completed.  _______________________________________________________  C.O.R.E. CLINIC Cardiomyopathy, Optimization, Rehabilitation, Education   The C.O.R.E. CLINIC is a heart failure specialty clinic within the [facility] where you will work with specialized nurse practitioners dedicated to helping patients with heart failure carefully adjust medications, receive education, and learn who and when to call if symptoms develop. They specialize in helping you better understand your condition, slow the progression of your disease, improve the length and quality of your life, help you detect future heart problems before they become life threatening, and avoid hospitalizations.  As always, thank you for trusting us with your health care needs!             Follow-ups after your visit        Additional Services     Follow-Up with [facility] [place] [facility]                 Your next 10 appointments already scheduled     Jun 23, 2018 10:00 AM CDT   Lab with [facility] ([facility])    [address]  1st Floor  [place] [facility] [address] [phone]            Jun 23, 2018 10:30 AM CDT   (Arrive by 10:15 AM)   [facility] [place] RETURN with GARRETT Mata [facility] ([facility])    [address]  [address]  [place] [facility] [place] [address] [phone]              Future tests that were ordered for you today     Open Future Orders        Priority Expected Expires Ordered    Follow-Up with [facility] [place] " "Clinic Routine 6/13/2018 9/4/2018 5/30/2018            Who to contact     If you have questions or need follow up information about today's clinic visit or your schedule please contact [facility] directly at [phone].  Normal or non-critical lab and imaging results will be communicated to you by MyChart, letter or phone within 4 business days after the clinic has received the results. If you do not hear from us within 7 days, please contact the clinic through MyChart or phone. If you have a critical or abnormal lab result, we will notify you by phone as soon as possible.  Submit refill requests through [employer] or call your pharmacy and they will forward the refill request to us. Please allow 3 business days for your refill to be completed.          Additional Information About Your Visit        Care EveryWhere ID     This is your Care EveryWhere ID. This could be used by other organizations to access your [facility] medical records  [id number]        Your Vitals Were     Pulse Height Pulse Oximetry BMI (Body Mass Index)          92 1.6 m (5' 3\") 97% 30.08 kg/m2         Blood Pressure from Last 3 Encounters:   05/30/18 121/82   05/08/18 93/59   04/15/18 103/75    Weight from Last 3 Encounters:   05/30/18 77 kg (169 lb 12.8 oz)   05/08/18 77.9 kg (171 lb 11.2 oz)   04/15/18 73.3 kg (161 lb 9.6 oz)              We Performed the Following     Follow-Up with [facility]          Today's Medication Changes          These changes are accurate as of 5/30/18  1:48 PM.  If you have any questions, ask your nurse or doctor.               Start taking these medicines.        Dose/Directions    carvedilol 3.125 MG tablet   Commonly known as:  COREG   Used for:  Acute on chronic combined systolic and diastolic congestive heart failure (H)   Started by:  Yoselin Victoria APRN CNP        Take 2 tablets in the am, 3 tablets in the pm.   Quantity:  60 tablet   Refills:  1            Where to get your medicines      These " medications were sent to [facility]/pharmacy #[id number] - [place], [place] - [address] [place] [address]  [address] [address]     Phone:  [phone]     carvedilol 3.125 MG tablet                Primary Care Provider Fax #    Physician No Ref-Primary [phone]       No address on file        Equal Access to Services     RAMIREZ KOVACS : Hadii aad ku hadasho Soomaali, waaxda luqadaha, qaybta kaalmada adeegyada, blue yeagervioletenzo pike . So [facility] [phone].    ATENCIÓN: Si habla español, tiene a villavicencio disposición servicios gratuitos de asistencia lingüística. Llame al [phone].    We comply with applicable federal civil rights laws and [place] laws. We do not discriminate on the basis of race, color, national origin, age, disability, sex, sexual orientation, or gender identity.            Thank you!     Thank you for choosing [facility]  for your care. Our goal is always to provide you with excellent care. Hearing back from our patients is one way we can continue to improve our services. Please take a few minutes to complete the written survey that you may receive in the mail after your visit with us. Thank you!             Your Updated Medication List - Protect others around you: Learn how to safely use, store and throw away your medicines at www.disposemymeds.org.          This list is accurate as of 5/30/18  1:48 PM.  Always use your most recent med list.                   Brand Name Dispense Instructions for use Diagnosis    amLODIPine 2.5 MG tablet    NORVASC    30 tablet    Take 1 tablet (2.5 mg) by mouth At Bedtime    Acute on chronic combined systolic and diastolic heart failure (H), Acute on chronic combined systolic and diastolic congestive heart failure (H), Biventricular heart failure, Benign essential hypertension       benzonatate 100 MG capsule    TESSALON    25 capsule    Take 2 capsules (200 mg) by mouth 3 times daily as needed for cough    Acute on chronic combined  systolic and diastolic congestive heart failure (H)       carvedilol 3.125 MG tablet    COREG    60 tablet    Take 2 tablets in the am, 3 tablets in the pm.    Acute on chronic combined systolic and diastolic congestive heart failure (H)       hydrALAZINE 100 MG Tabs tablet    APRESOLINE    120 tablet    Take 1 tablet (100 mg) by mouth 3 times daily    Acute on chronic combined systolic and diastolic heart failure (H), Biventricular heart failure       isosorbide mononitrate 60 MG 24 hr tablet    IMDUR    30 tablet    Take 1 tablet (60 mg) by mouth every morning    Acute on chronic combined systolic and diastolic congestive heart failure (H), Acute on chronic combined systolic and diastolic heart failure (H), Benign essential hypertension       losartan 50 MG tablet    COZAAR    90 tablet    Take 50mg every morning and 100mg every night    Acute on chronic combined systolic and diastolic congestive heart failure (H)

## 2018-05-30 NOTE — PATIENT INSTRUCTIONS
"You were seen today in the Cardiovascular Clinic at the [facility].     Cardiology Providers you saw during your visit: Yoselin Victoria NP     Follow up and medication changes:    1. Increase Coreg to 2 tabs in the morning, 3 tabs in the evening.   2. Return to clinic in 2 weeks.       Results for JAVI CASTELLANO (MRN [id number]) as of 2018 13:39   Ref. Range 2018 12:53   Sodium Latest Ref Range: 133 - 144 mmol/L 141   Potassium Latest Ref Range: 3.4 - 5.3 mmol/L 4.2   Chloride Latest Ref Range: 94 - 109 mmol/L 108   Carbon Dioxide Latest Ref Range: 20 - 32 mmol/L 24   Urea Nitrogen Latest Ref Range: 7 - 30 mg/dL 18   Creatinine Latest Ref Range: 0.66 - 1.25 mg/dL 1.48 (H)   GFR Estimate Latest Ref Range: >60 mL/min/1.7m2 54 (L)   GFR Estimate If Black Latest Ref Range: >60 mL/min/1.7m2 65   Calcium Latest Ref Range: 8.5 - 10.1 mg/dL 8.9   Anion Gap Latest Ref Range: 3 - 14 mmol/L 9   Glucose Latest Ref Range: 70 - 99 mg/dL 123 (H)         Please limit your fluid intake to 2 L (68 ounces) daily.  2 Liters a day = 8.5 cups, or 68 ounces.  Please limit your salt intake to 2 grams a day or less.     If you gain 2# in 24 hours or 5# in one week call Cuca Flores RN so we can adjust your medications as needed over the phone.     Please feel free to call me with any questions or concerns.       Cuca Flores RN  [facility]  Cardiology Care Coordinator-Heart Failure Clinic     Questions and [phone].   First press #1 for the [employer] and then press #3 for \"Medical Questions\" to reach us Cardiology Nurses.      On Call Cardiologist for after hours or on weekends: [phone]   option #4 and ask to speak to the on-call Cardiologist. Inform them you are a CORE/heart failure patient at the [facility].           If you need a medication refill please contact your pharmacy.  Please allow 3 business days for your refill to be " completed.  _______________________________________________________  C.O.R.E. CLINIC Cardiomyopathy, Optimization, Rehabilitation, Education   The C.O.R.E. CLINIC is a heart failure specialty clinic within the [facility] where you will work with specialized nurse practitioners dedicated to helping patients with heart failure carefully adjust medications, receive education, and learn who and when to call if symptoms develop. They specialize in helping you better understand your condition, slow the progression of your disease, improve the length and quality of your life, help you detect future heart problems before they become life threatening, and avoid hospitalizations.  As always, thank you for trusting us with your health care needs!

## 2018-05-30 NOTE — NURSING NOTE
Diet: Patient instructed regarding a heart healthy diet, including discussion of reduced fat and sodium intake. Patient demonstrated understanding of this information and agreed to call with further questions or concerns.  Return Appointment: Patient given instructions regarding scheduling next clinic visit. Patient demonstrated understanding of this information and agreed to call with further questions or concerns.  Medication Change: Patient was educated regarding prescribed medication change, including discussion of the indication, administration, side effects, and when to report to MD or RN. Patient demonstrated understanding of this information and agreed to call with further questions or concerns.  Reviewed heart biopsy results which were amyloid negative with patient and significant other.   Patient stated he understood all health information given and agreed to call with further questions or concerns.   Cuca Flores RN

## 2018-06-17 ENCOUNTER — PRE VISIT (OUTPATIENT)
Dept: CARDIOLOGY | Facility: CLINIC | Age: 38
End: 2018-06-17

## 2018-06-17 DIAGNOSIS — I50.22 CHRONIC SYSTOLIC HEART FAILURE (H): Primary | ICD-10-CM

## 2018-06-21 ENCOUNTER — OFFICE VISIT (OUTPATIENT)
Dept: CARDIOLOGY | Facility: CLINIC | Age: 38
End: 2018-06-21
Attending: NURSE PRACTITIONER
Payer: COMMERCIAL

## 2018-06-21 VITALS
HEART RATE: 98 BPM | OXYGEN SATURATION: 96 % | DIASTOLIC BLOOD PRESSURE: 80 MMHG | WEIGHT: 173.9 LBS | HEIGHT: 63 IN | BODY MASS INDEX: 30.81 KG/M2 | SYSTOLIC BLOOD PRESSURE: 121 MMHG

## 2018-06-21 DIAGNOSIS — I50.43 ACUTE ON CHRONIC COMBINED SYSTOLIC AND DIASTOLIC CONGESTIVE HEART FAILURE (H): ICD-10-CM

## 2018-06-21 DIAGNOSIS — I50.22 CHRONIC SYSTOLIC HEART FAILURE (H): ICD-10-CM

## 2018-06-21 DIAGNOSIS — I50.43 ACUTE ON CHRONIC COMBINED SYSTOLIC AND DIASTOLIC HEART FAILURE (H): ICD-10-CM

## 2018-06-21 LAB
ANION GAP SERPL CALCULATED.3IONS-SCNC: 5 MMOL/L (ref 3–14)
BUN SERPL-MCNC: 24 MG/DL (ref 7–30)
CALCIUM SERPL-MCNC: 8.6 MG/DL (ref 8.5–10.1)
CHLORIDE SERPL-SCNC: 106 MMOL/L (ref 94–109)
CO2 SERPL-SCNC: 29 MMOL/L (ref 20–32)
CREAT SERPL-MCNC: 1.75 MG/DL (ref 0.66–1.25)
GFR SERPL CREATININE-BSD FRML MDRD: 44 ML/MIN/1.7M2
GLUCOSE SERPL-MCNC: 99 MG/DL (ref 70–99)
POTASSIUM SERPL-SCNC: 4.3 MMOL/L (ref 3.4–5.3)
SODIUM SERPL-SCNC: 140 MMOL/L (ref 133–144)

## 2018-06-21 PROCEDURE — 99214 OFFICE O/P EST MOD 30 MIN: CPT | Mod: ZP | Performed by: NURSE PRACTITIONER

## 2018-06-21 PROCEDURE — 80048 BASIC METABOLIC PNL TOTAL CA: CPT | Performed by: NURSE PRACTITIONER

## 2018-06-21 PROCEDURE — 36415 COLL VENOUS BLD VENIPUNCTURE: CPT | Performed by: NURSE PRACTITIONER

## 2018-06-21 PROCEDURE — G0463 HOSPITAL OUTPT CLINIC VISIT: HCPCS | Mod: ZF

## 2018-06-21 RX ORDER — CARVEDILOL 3.12 MG/1
TABLET ORAL
Qty: 60 TABLET | Refills: 1 | COMMUNITY
Start: 2018-06-21 | End: 2018-09-18 | Stop reason: DRUGHIGH

## 2018-06-21 RX ORDER — LOSARTAN POTASSIUM 50 MG/1
TABLET ORAL
Qty: 90 TABLET | Refills: 1 | Status: SHIPPED | OUTPATIENT
Start: 2018-06-21 | End: 2018-07-11

## 2018-06-21 ASSESSMENT — PAIN SCALES - GENERAL: PAINLEVEL: NO PAIN (0)

## 2018-06-21 NOTE — PROGRESS NOTES
HPI: 37 yr old male patient returns to CORE clinic for follow up. 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 He unfortunately is still  using meth - statating he is using it 3x/week. He is also using tobacco 3-5 cigarettes/day. He is watching the sodium in his diet, avoiding high sodium foods.  He c/o of a cough over the past two days, associated with headache. This spontaneously resolved.  He is continue to be able to play soccer with some older men, - he states it is low key.   At his last visit, his coreg dose was increased and he is tolerating this without difficulty.  Pt denies SOB, orthopnea, PND, chest pain, belly bloating, loss of appetite, LE edema. Pt states energy level is good.     PAST MEDICAL HISTORY:  Past Medical History:   Diagnosis Date     Hypertension        FAMILY HISTORY:  Family History   Problem Relation Age of Onset     Depression Mother      Anxiety Disorder Mother      Myocardial Infarction Mother      age of onset unknown     Myocardial Infarction Father      age of onset unknown     Cardiac Sudden Death Brother      had sudden cardiac death at 36       SOCIAL HISTORY:  Social History     Social History     Marital status: Single     Spouse name: N/A     Number of children: N/A     Years of education: N/A     Social History Main Topics     Smoking status: Current Some Day Smoker     Packs/day: 0.25     Types: Cigarettes     Smokeless tobacco: Never Used     Alcohol use No      Comment: social events only 1 drink 3-4 times per year     Drug use: 3.00 per week     Special: Methamphetamines      Comment: 1 gram 2-3 times per week     Sexual activity: Not Asked     Other Topics Concern     None     Social History Narrative       CURRENT MEDICATIONS:  Current Outpatient Prescriptions   Medication Sig Dispense Refill     amLODIPine (NORVASC) 2.5 MG tablet Take 1 tablet (2.5 mg) by mouth At Bedtime 30 tablet 1     carvedilol (COREG) 3.125 MG tablet Take 2 tablets in the am, 3  "tablets in the pm. 60 tablet 1     hydrALAZINE (APRESOLINE) 100 MG TABS tablet Take 1 tablet (100 mg) by mouth 3 times daily 120 tablet 1     isosorbide mononitrate (IMDUR) 60 MG 24 hr tablet Take 1 tablet (60 mg) by mouth every morning 30 tablet 1     losartan (COZAAR) 50 MG tablet Take 50mg every morning and 100mg every night 90 tablet 1     benzonatate (TESSALON) 100 MG capsule Take 2 capsules (200 mg) by mouth 3 times daily as needed for cough (Patient not taking: Reported on 5/8/2018) 25 capsule 1       ROS:   Constitutional: No fever, chills, or sweats. No weight gain/loss.   ENT: No visual disturbance, ear ache, epistaxis, sore throat.   Allergies/Immunologic: Negative.   Respiratory: No cough, hemoptysis.   Cardiovascular: As per HPI.   GI: No nausea, vomiting, hematemesis, melena, or hematochezia.   : No urinary frequency, dysuria, or hematuria.   Integument: Negative.   Psychiatric: Negative.   Neuro: Negative.   Endocrinology: Negative.   Musculoskeletal: Negative.    EXAM:  /80 (BP Location: Left arm, Patient Position: Chair, Cuff Size: Adult Large)  Pulse 98  Ht 1.6 m (5' 3\")  Wt 78.9 kg (173 lb 14.4 oz)  SpO2 96%  BMI 30.81 kg/m2  General: appears comfortable, alert and articulate  Head: normocephalic, atraumatic  Eyes: anicteric sclera, EOMI  Neck: no adenopathy  Orophyarynx: moist mucosa, no lesions, dentition intact  Heart: regular, S1/S2, no murmur, gallop, rub, estimated JVP 8cm  Lungs: clear, no rales or wheezing  Abdomen: soft, non-tender, bowel sounds present, no hepatosplenomegaly  Extremities: no clubbing, cyanosis or edema  Neurological: normal speech and affect, no gross motor deficits    Labs:  CBC RESULTS:  Lab Results   Component Value Date    WBC 11.6 (H) 04/15/2018    RBC 5.39 04/15/2018    HGB 15.4 04/15/2018    HCT 46.8 04/15/2018    MCV 87 04/15/2018    MCH 28.6 04/15/2018    MCHC 32.9 04/15/2018    RDW 15.6 (H) 04/15/2018     04/15/2018       CMP RESULTS:  Lab " Results   Component Value Date     06/21/2018    POTASSIUM 4.3 06/21/2018    CHLORIDE 106 06/21/2018    CO2 29 06/21/2018    ANIONGAP 5 06/21/2018    GLC 99 06/21/2018    BUN 24 06/21/2018    CR 1.75 (H) 06/21/2018    GFRESTIMATED 44 (L) 06/21/2018    GFRESTBLACK 53 (L) 06/21/2018    SAMARA 8.6 06/21/2018    BILITOTAL 1.3 04/05/2018    ALBUMIN 3.1 (L) 04/05/2018    ALKPHOS 76 04/05/2018    ALT 58 04/05/2018    AST 57 (H) 04/05/2018        INR RESULTS:  No results found for: INR    Lab Results   Component Value Date    MAG 1.9 04/15/2018     No results found for: NTBNPI  Lab Results   Component Value Date    NTBNP 3640 (H) 05/08/2018       Assessment and Plan:   1. 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    Stage C  NYHA Class II  ACEi/ARB yes  BB titration ongoing - coreg dose increased to 9.375 BID.  Aldosterone antagonist deferred while other medical therapy is prioritized  SCD prophylaxis decision deferred during medication uptitration  % BiV pacing: N/A  Fluid status euvolemic      2. Meth use: discussed treatment - pt encouraged to attend treatment.    3. Ongoing tobacco use: discussed cessation - however, his meth use is the bigger concern at this time.     4. CKD: suspect due to low output. Stable renal function    Follow-up in 2 weeks.     CC  Patient Care Team:  No Ref-Primary, Physician as PCP - Corina Tavares, RN as Nurse Coordinator (Cardiology)  Enedina Chirinos, APRN CNP as Nurse Practitioner (Cardiology)  Cuca Flores RN as Nurse Coordinator (Cardiology)  ENEDINA CHIRINOS

## 2018-06-21 NOTE — NURSING NOTE
Chief Complaint   Patient presents with     Follow Up For     Return CORE; 37yr old male with a h/o chronic biventricular heart failure presenting for HF follow up with labs prior.     Vitals were taken and medications were reconciled.  CARLA Martinez  5:01 PM

## 2018-06-21 NOTE — MR AVS SNAPSHOT
"              After Visit Summary   2018    Palomo Patterson    MRN: [id number]           Patient Information     Date Of Birth          1980        Visit Information        Provider Department      2018 5:00 PM Yoselin Victoria, GARRETT [facility]        Today's Diagnoses     Acute on chronic combined systolic and diastolic heart failure (H)        Acute on chronic combined systolic and diastolic congestive heart failure (H)          Care Instructions    You were seen today in the Cardiovascular Clinic at the [facility].     Cardiology Providers you saw during your visit: Yoselin Victoria NP     1. Increase Carvedilol to 9.375mg (3 tablets of 3.125mg) twice a day. Take with food.  2. Attend treatment for chemical dependency  3. Continue on low sodium diet.          Please limit your fluid intake to 2 L (64 ounces) daily.  2 Liters a day = 8.5 cups, or 64 ounces.  Please limit your salt intake to 2 grams a day or less.     If you gain 2# in 24 hours or 5# in one week call Corina Cuellar RN so we can adjust your medications as needed over the phone.     Please feel free to call me with any questions or concerns.       Corina Cuellar RN BSN CHFN  [facility]  Cardiology Care Coordinator-Heart Failure Clinic     Questions and [phone].   First press #1 for the [employer] and then press #3 for \"Medical Questions\" to reach us Cardiology Nurses.      On Call Cardiologist for after hours or on weekends: [phone]   option #4 and ask to speak to the on-call Cardiologist. Inform them you are a CORE/heart failure patient at the [facility].           If you need a medication refill please contact your pharmacy.  Please allow 3 business days for your refill to be completed.  _______________________________________________________  C.O.R.E. CLINIC Cardiomyopathy, Optimization, Rehabilitation, Education   The C.O.R.E. CLINIC is a heart failure specialty " clinic within the [facility] where you will work with specialized nurse practitioners dedicated to helping patients with heart failure carefully adjust medications, receive education, and learn who and when to call if symptoms develop. They specialize in helping you better understand your condition, slow the progression of your disease, improve the length and quality of your life, help you detect future heart problems before they become life threatening, and avoid hospitalizations.  As always, thank you for trusting us with your health care needs!           Follow-ups after your visit        Additional Services     Follow-Up with CORE Clinic       F/u in 2 weeks with Yoselin gomez                  Your next 10 appointments already scheduled     Jul 11, 2018 12:30 PM CDT   Lab with  LAB   [facility] ([facility])    [address]  1st Floor  [facility] [address] [phone]            Jul 11, 2018  1:00 PM CDT   (Arrive by 12:45 PM)   CORE RETURN with GARRETT Mata [facility] ([facility])    [address]  [address] [place] [address] [phone]              Future tests that were ordered for you today     Open Future Orders        Priority Expected Expires Ordered    Follow-Up with CORE Clinic Routine 7/10/2018 9/26/2018 6/21/2018            Who to contact     If you have questions or need follow up information about today's clinic visit or your schedule please contact [facility] directly at [phone].  Normal or non-critical lab and imaging results will be communicated to you by MyChart, letter or phone within 4 business days after the clinic has received the results. If you do not hear from us within 7 days, please contact the clinic through MyChart or phone. If you have a critical or abnormal lab result, we will notify you by phone as soon as possible.  Submit  "refill requests through [employer] or call your pharmacy and they will forward the refill request to us. Please allow 3 business days for your refill to be completed.          Additional Information About Your Visit        Care EveryWhere ID     This is your Care EveryWhere ID. This could be used by other organizations to access your [facility] medical records  [id number]        Your Vitals Were     Pulse Height Pulse Oximetry BMI (Body Mass Index)          98 1.6 m (5' 3\") 96% 30.81 kg/m2         Blood Pressure from Last 3 Encounters:   06/21/18 121/80   05/30/18 121/82   05/08/18 93/59    Weight from Last 3 Encounters:   06/21/18 78.9 kg (173 lb 14.4 oz)   05/30/18 77 kg (169 lb 12.8 oz)   05/08/18 77.9 kg (171 lb 11.2 oz)              We Performed the Following     Follow-Up with CORE Clinic          Today's Medication Changes          These changes are accurate as of 6/21/18  5:57 PM.  If you have any questions, ask your nurse or doctor.               These medicines have changed or have updated prescriptions.        Dose/Directions    carvedilol 3.125 MG tablet   Commonly known as:  COREG   This may have changed:  additional instructions   Used for:  Acute on chronic combined systolic and diastolic congestive heart failure (H)   Changed by:  Yoselin Victoria APRN CNP        Take 3 tablets in the am, 3 tablets in the pm.   Quantity:  60 tablet   Refills:  1            Where to get your medicines      These medications were sent to [facility]/pharmacy #[id number] [address] [address]     Phone:  [phone]     losartan 50 MG tablet                Primary Care Provider Fax #    Physician No Ref-Primary [phone]       No address on file        Equal Access to Services     JINA KOVACS : Radha King, blas harry, blue lopez. So [facility] [phone].    ATENCIÓN: Si sky medrano " villavicencio disposición servicios gratuitos de asistencia lingüística. Jamar velazco [phone].    We comply with applicable federal civil rights laws and [place] laws. We do not discriminate on the basis of race, color, national origin, age, disability, sex, sexual orientation, or gender identity.            Thank you!     Thank you for choosing [facility]  for your care. Our goal is always to provide you with excellent care. Hearing back from our patients is one way we can continue to improve our services. Please take a few minutes to complete the written survey that you may receive in the mail after your visit with us. Thank you!             Your Updated Medication List - Protect others around you: Learn how to safely use, store and throw away your medicines at www.disposemymeds.org.          This list is accurate as of 6/21/18  5:57 PM.  Always use your most recent med list.                   Brand Name Dispense Instructions for use Diagnosis    amLODIPine 2.5 MG tablet    NORVASC    30 tablet    Take 1 tablet (2.5 mg) by mouth At Bedtime    Acute on chronic combined systolic and diastolic heart failure (H), Acute on chronic combined systolic and diastolic congestive heart failure (H), Biventricular heart failure, Benign essential hypertension       benzonatate 100 MG capsule    TESSALON    25 capsule    Take 2 capsules (200 mg) by mouth 3 times daily as needed for cough    Acute on chronic combined systolic and diastolic congestive heart failure (H)       carvedilol 3.125 MG tablet    COREG    60 tablet    Take 3 tablets in the am, 3 tablets in the pm.    Acute on chronic combined systolic and diastolic congestive heart failure (H)       hydrALAZINE 100 MG Tabs tablet    APRESOLINE    120 tablet    Take 1 tablet (100 mg) by mouth 3 times daily    Acute on chronic combined systolic and diastolic heart failure (H), Biventricular heart failure       isosorbide mononitrate 60 MG 24 hr tablet    IMDUR    30 tablet     Take 1 tablet (60 mg) by mouth every morning    Acute on chronic combined systolic and diastolic congestive heart failure (H), Acute on chronic combined systolic and diastolic heart failure (H), Benign essential hypertension       losartan 50 MG tablet    COZAAR    90 tablet    Take 50mg every morning and 100mg every night    Acute on chronic combined systolic and diastolic congestive heart failure (H)

## 2018-06-21 NOTE — PATIENT INSTRUCTIONS
"You were seen today in the Cardiovascular Clinic at the [facility].     Cardiology Providers you saw during your visit: Yoselin Victoria NP     1. Increase Carvedilol to 9.375mg (3 tablets of 3.125mg) twice a day. Take with food.  2. Attend treatment for chemical dependency  3. Continue on low sodium diet.          Please limit your fluid intake to 2 L (64 ounces) daily.  2 Liters a day = 8.5 cups, or 64 ounces.  Please limit your salt intake to 2 grams a day or less.     If you gain 2# in 24 hours or 5# in one week call Corina uCellar RN so we can adjust your medications as needed over the phone.     Please feel free to call me with any questions or concerns.       Corina Cuellar RN BSN CHFN  [facility]  Cardiology Care Coordinator-Heart Failure Clinic     Questions and [phone].   First press #1 for the [employer] and then press #3 for \"Medical Questions\" to reach us Cardiology Nurses.      On Call Cardiologist for after hours or on weekends: [phone]   option #4 and ask to speak to the on-call Cardiologist. Inform them you are a CORE/heart failure patient at the [facility].           If you need a medication refill please contact your pharmacy.  Please allow 3 business days for your refill to be completed.  _______________________________________________________  C.O.R.E. CLINIC Cardiomyopathy, Optimization, Rehabilitation, Education   The C.O.R.E. CLINIC is a heart failure specialty clinic within the [facility] where you will work with specialized nurse practitioners dedicated to helping patients with heart failure carefully adjust medications, receive education, and learn who and when to call if symptoms develop. They specialize in helping you better understand your condition, slow the progression of your disease, improve the length and quality of your life, help you detect future heart problems before they become life " threatening, and avoid hospitalizations.  As always, thank you for trusting us with your health care needs!

## 2018-06-21 NOTE — LETTER
6/21/2018    RE: Palomo Patterson  [address]  [place] [address]       Dear Colleague,    Thank you for the opportunity to participate in the care of your patient, Palomo Patterson, at the [facility] HEART [facility]. Please see a copy of my visit note below.    HPI: 37 yr old male patient returns to CORE clinic for follow up. 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 He unfortunately is still  using meth - statating he is using it 3x/week. He is also using tobacco 3-5 cigarettes/day. He is watching the sodium in his diet, avoiding high sodium foods.  He c/o of a cough over the past two days, associated with headache. This spontaneously resolved.  He is continue to be able to play soccer with some older men, - he states it is low key.   At his last visit, his coreg dose was increased and he is tolerating this without difficulty.  Pt denies SOB, orthopnea, PND, chest pain, belly bloating, loss of appetite, LE edema. Pt states energy level is good.     PAST MEDICAL HISTORY:  Past Medical History:   Diagnosis Date     Hypertension        FAMILY HISTORY:  Family History   Problem Relation Age of Onset     Depression Mother      Anxiety Disorder Mother      Myocardial Infarction Mother      age of onset unknown     Myocardial Infarction Father      age of onset unknown     Cardiac Sudden Death Brother      had sudden cardiac death at 36       SOCIAL HISTORY:  Social History     Social History     Marital status: Single     Spouse name: N/A     Number of children: N/A     Years of education: N/A     Social History Main Topics     Smoking status: Current Some Day Smoker     Packs/day: 0.25     Types: Cigarettes     Smokeless tobacco: Never Used     Alcohol use No      Comment: social events only 1 drink 3-4 times per year     Drug use: 3.00 per week     Special: Methamphetamines      Comment: 1 gram 2-3 times per week     Sexual activity: Not Asked     Other  "Topics Concern     None     Social History Narrative       CURRENT MEDICATIONS:  Current Outpatient Prescriptions   Medication Sig Dispense Refill     amLODIPine (NORVASC) 2.5 MG tablet Take 1 tablet (2.5 mg) by mouth At Bedtime 30 tablet 1     carvedilol (COREG) 3.125 MG tablet Take 2 tablets in the am, 3 tablets in the pm. 60 tablet 1     hydrALAZINE (APRESOLINE) 100 MG TABS tablet Take 1 tablet (100 mg) by mouth 3 times daily 120 tablet 1     isosorbide mononitrate (IMDUR) 60 MG 24 hr tablet Take 1 tablet (60 mg) by mouth every morning 30 tablet 1     losartan (COZAAR) 50 MG tablet Take 50mg every morning and 100mg every night 90 tablet 1     benzonatate (TESSALON) 100 MG capsule Take 2 capsules (200 mg) by mouth 3 times daily as needed for cough (Patient not taking: Reported on 5/8/2018) 25 capsule 1       ROS:   Constitutional: No fever, chills, or sweats. No weight gain/loss.   ENT: No visual disturbance, ear ache, epistaxis, sore throat.   Allergies/Immunologic: Negative.   Respiratory: No cough, hemoptysis.   Cardiovascular: As per HPI.   GI: No nausea, vomiting, hematemesis, melena, or hematochezia.   : No urinary frequency, dysuria, or hematuria.   Integument: Negative.   Psychiatric: Negative.   Neuro: Negative.   Endocrinology: Negative.   Musculoskeletal: Negative.    EXAM:  /80 (BP Location: Left arm, Patient Position: Chair, Cuff Size: Adult Large)  Pulse 98  Ht 1.6 m (5' 3\")  Wt 78.9 kg (173 lb 14.4 oz)  SpO2 96%  BMI 30.81 kg/m2  General: appears comfortable, alert and articulate  Head: normocephalic, atraumatic  Eyes: anicteric sclera, EOMI  Neck: no adenopathy  Orophyarynx: moist mucosa, no lesions, dentition intact  Heart: regular, S1/S2, no murmur, gallop, rub, estimated JVP 8cm  Lungs: clear, no rales or wheezing  Abdomen: soft, non-tender, bowel sounds present, no hepatosplenomegaly  Extremities: no clubbing, cyanosis or edema  Neurological: normal speech and affect, no gross " motor deficits    Labs:  CBC RESULTS:  Lab Results   Component Value Date    WBC 11.6 (H) 04/15/2018    RBC 5.39 04/15/2018    HGB 15.4 04/15/2018    HCT 46.8 04/15/2018    MCV 87 04/15/2018    MCH 28.6 04/15/2018    MCHC 32.9 04/15/2018    RDW 15.6 (H) 04/15/2018     04/15/2018       CMP RESULTS:  Lab Results   Component Value Date     06/21/2018    POTASSIUM 4.3 06/21/2018    CHLORIDE 106 06/21/2018    CO2 29 06/21/2018    ANIONGAP 5 06/21/2018    GLC 99 06/21/2018    BUN 24 06/21/2018    CR 1.75 (H) 06/21/2018    GFRESTIMATED 44 (L) 06/21/2018    GFRESTBLACK 53 (L) 06/21/2018    SAMARA 8.6 06/21/2018    BILITOTAL 1.3 04/05/2018    ALBUMIN 3.1 (L) 04/05/2018    ALKPHOS 76 04/05/2018    ALT 58 04/05/2018    AST 57 (H) 04/05/2018        INR RESULTS:  No results found for: INR    Lab Results   Component Value Date    MAG 1.9 04/15/2018     No results found for: NTBNPI  Lab Results   Component Value Date    NTBNP 3640 (H) 05/08/2018       Assessment and Plan:   1. 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    Stage C  NYHA Class II  ACEi/ARB yes  BB titration ongoing - coreg dose increased to 9.375 BID.  Aldosterone antagonist deferred while other medical therapy is prioritized  SCD prophylaxis decision deferred during medication uptitration  % BiV pacing: N/A  Fluid status euvolemic      2. Meth use: discussed treatment - pt encouraged to attend treatment.    3. Ongoing tobacco use: discussed cessation - however, his meth use is the bigger concern at this time.     4. CKD: suspect due to low output. Stable renal function    Follow-up in 2 weeks.     Please do not hesitate to contact me if you have any questions/concerns.     Sincerely,     GARRETT Mata CNP    CC  Patient Care Team:  No Ref-Primary, Physician as PCP - Corina Tavares RN as Nurse Coordinator (Cardiology)  Yoselin Victoria APRN CNP as Nurse Practitioner (Cardiology)  Cuca Flores RN  as Nurse Coordinator (Cardiology)  ENEDINA CHIRINOS

## 2018-06-30 DIAGNOSIS — I50.43 ACUTE ON CHRONIC COMBINED SYSTOLIC AND DIASTOLIC HEART FAILURE (H): ICD-10-CM

## 2018-06-30 DIAGNOSIS — I50.43 ACUTE ON CHRONIC COMBINED SYSTOLIC AND DIASTOLIC CONGESTIVE HEART FAILURE (H): ICD-10-CM

## 2018-06-30 DIAGNOSIS — I10 BENIGN ESSENTIAL HYPERTENSION: ICD-10-CM

## 2018-07-05 RX ORDER — ISOSORBIDE MONONITRATE 60 MG/1
TABLET, EXTENDED RELEASE ORAL
Qty: 90 TABLET | Refills: 3 | Status: SHIPPED | OUTPATIENT
Start: 2018-07-05 | End: 2018-12-06

## 2018-07-05 NOTE — TELEPHONE ENCOUNTER
[facility] Call Center    Phone Message    May a detailed message be left on voicemail: yes    Reason for Call: Other: Per call from [facility] is still waiting for the refill. Per [facility] is completely out of the medication.     Action Taken: Message routed to:  [facility] ([facility] Cardiology

## 2018-07-09 ENCOUNTER — PRE VISIT (OUTPATIENT)
Dept: CARDIOLOGY | Facility: CLINIC | Age: 38
End: 2018-07-09

## 2018-07-09 DIAGNOSIS — I50.82 BIVENTRICULAR HEART FAILURE (H): Primary | ICD-10-CM

## 2018-07-11 ENCOUNTER — OFFICE VISIT (OUTPATIENT)
Dept: CARDIOLOGY | Facility: CLINIC | Age: 38
End: 2018-07-11
Attending: NURSE PRACTITIONER
Payer: COMMERCIAL

## 2018-07-11 VITALS
SYSTOLIC BLOOD PRESSURE: 120 MMHG | BODY MASS INDEX: 31.02 KG/M2 | HEART RATE: 89 BPM | OXYGEN SATURATION: 98 % | WEIGHT: 175.1 LBS | DIASTOLIC BLOOD PRESSURE: 77 MMHG | HEIGHT: 63 IN

## 2018-07-11 DIAGNOSIS — I50.82 BIVENTRICULAR HEART FAILURE (H): ICD-10-CM

## 2018-07-11 DIAGNOSIS — I50.43 ACUTE ON CHRONIC COMBINED SYSTOLIC AND DIASTOLIC HEART FAILURE (H): ICD-10-CM

## 2018-07-11 DIAGNOSIS — I50.43 ACUTE ON CHRONIC COMBINED SYSTOLIC AND DIASTOLIC CONGESTIVE HEART FAILURE (H): ICD-10-CM

## 2018-07-11 DIAGNOSIS — I10 BENIGN ESSENTIAL HYPERTENSION: ICD-10-CM

## 2018-07-11 LAB
ANION GAP SERPL CALCULATED.3IONS-SCNC: 6 MMOL/L (ref 3–14)
BUN SERPL-MCNC: 17 MG/DL (ref 7–30)
CALCIUM SERPL-MCNC: 9.5 MG/DL (ref 8.5–10.1)
CHLORIDE SERPL-SCNC: 107 MMOL/L (ref 94–109)
CO2 SERPL-SCNC: 28 MMOL/L (ref 20–32)
CREAT SERPL-MCNC: 1.66 MG/DL (ref 0.66–1.25)
GFR SERPL CREATININE-BSD FRML MDRD: 47 ML/MIN/1.7M2
GLUCOSE SERPL-MCNC: 94 MG/DL (ref 70–99)
POTASSIUM SERPL-SCNC: 3.9 MMOL/L (ref 3.4–5.3)
SODIUM SERPL-SCNC: 141 MMOL/L (ref 133–144)

## 2018-07-11 PROCEDURE — 36415 COLL VENOUS BLD VENIPUNCTURE: CPT | Performed by: NURSE PRACTITIONER

## 2018-07-11 PROCEDURE — 99214 OFFICE O/P EST MOD 30 MIN: CPT | Mod: ZP | Performed by: NURSE PRACTITIONER

## 2018-07-11 PROCEDURE — 80048 BASIC METABOLIC PNL TOTAL CA: CPT | Performed by: NURSE PRACTITIONER

## 2018-07-11 PROCEDURE — G0463 HOSPITAL OUTPT CLINIC VISIT: HCPCS | Mod: ZF

## 2018-07-11 RX ORDER — LOSARTAN POTASSIUM 100 MG/1
TABLET ORAL
Qty: 90 TABLET | Refills: 3 | Status: SHIPPED | OUTPATIENT
Start: 2018-07-11 | End: 2018-12-03

## 2018-07-11 RX ORDER — AMLODIPINE BESYLATE 2.5 MG/1
2.5 TABLET ORAL AT BEDTIME
Qty: 90 TABLET | Refills: 3 | Status: SHIPPED | OUTPATIENT
Start: 2018-07-11 | End: 2018-12-06

## 2018-07-11 RX ORDER — CARVEDILOL 6.25 MG/1
6.25 TABLET ORAL 2 TIMES DAILY WITH MEALS
COMMUNITY
End: 2018-07-11

## 2018-07-11 RX ORDER — FUROSEMIDE 20 MG
20 TABLET ORAL DAILY
Qty: 30 TABLET | Refills: 1 | COMMUNITY
Start: 2018-07-11 | End: 2018-09-18

## 2018-07-11 RX ORDER — CARVEDILOL 6.25 MG/1
6.25 TABLET ORAL 2 TIMES DAILY WITH MEALS
Qty: 180 TABLET | Refills: 3 | Status: SHIPPED | OUTPATIENT
Start: 2018-07-11 | End: 2018-09-18

## 2018-07-11 ASSESSMENT — PAIN SCALES - GENERAL: PAINLEVEL: NO PAIN (0)

## 2018-07-11 NOTE — PROGRESS NOTES
HPI: 37 yr old male patient returns to CORE clinic for follow up. 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 He unfortunately is still  using meth - statating he is using it 2-3x/week. He is also using tobacco 3-5 cigarettes/day. He is watching the sodium in his diet, avoiding high sodium foods, although indicates he is drinking gatorade instead of water when he plays soccer.   He c/o of a cough - this is at times productive of clear sputum. He has been working on his car, and thinks the cough is due to his sanding his car.  He is continue to be able to play soccer with some older men, - he states it is low key.   At his last visit, his coreg dose was increased and he is tolerating this without difficulty.  Pt denies SOB, orthopnea, PND, chest pain, belly bloating, loss of appetite, LE edema. Pt states energy level is good.     PAST MEDICAL HISTORY:  Past Medical History:   Diagnosis Date     Hypertension        FAMILY HISTORY:  Family History   Problem Relation Age of Onset     Depression Mother      Anxiety Disorder Mother      Myocardial Infarction Mother      age of onset unknown     Myocardial Infarction Father      age of onset unknown     Cardiac Sudden Death Brother      had sudden cardiac death at 36       SOCIAL HISTORY:  Social History     Social History     Marital status: Single     Spouse name: N/A     Number of children: N/A     Years of education: N/A     Social History Main Topics     Smoking status: Current Some Day Smoker     Packs/day: 0.25     Types: Cigarettes     Smokeless tobacco: Never Used     Alcohol use No      Comment: social events only 1 drink 3-4 times per year     Drug use: 3.00 per week     Special: Methamphetamines      Comment: 1 gram 2-3 times per week     Sexual activity: Not Asked     Other Topics Concern     None     Social History Narrative       CURRENT MEDICATIONS:  Current Outpatient Prescriptions   Medication Sig Dispense Refill     amLODIPine (NORVASC)  "2.5 MG tablet Take 1 tablet (2.5 mg) by mouth At Bedtime 30 tablet 1     carvedilol (COREG) 3.125 MG tablet Take1 tablets in the am, 1 tablets in the pm. 60 tablet 1     carvedilol (COREG) 6.25 MG tablet Take 6.25 mg by mouth 2 times daily (with meals) 1 po in the morning/ 1 po int he afternoon       hydrALAZINE (APRESOLINE) 100 MG TABS tablet Take 1 tablet (100 mg) by mouth 3 times daily 120 tablet 1     isosorbide mononitrate (IMDUR) 60 MG 24 hr tablet TAKE 1 TABLET BY MOUTH EVERY MORNING 90 tablet 3     losartan (COZAAR) 50 MG tablet Take 50mg every morning and 100mg every night 90 tablet 1     benzonatate (TESSALON) 100 MG capsule Take 2 capsules (200 mg) by mouth 3 times daily as needed for cough (Patient not taking: Reported on 7/11/2018) 25 capsule 1       ROS:   Constitutional: No fever, chills, or sweats. No weight gain/loss.   ENT: No visual disturbance, ear ache, epistaxis, sore throat.   Allergies/Immunologic: Negative.   Respiratory: No cough, hemoptysis.   Cardiovascular: As per HPI.   GI: No nausea, vomiting, hematemesis, melena, or hematochezia.   : No urinary frequency, dysuria, or hematuria.   Integument: Negative.   Psychiatric: Negative.   Neuro: Negative.   Endocrinology: Negative.   Musculoskeletal: Negative.    EXAM:  /77 (BP Location: Left arm, Patient Position: Chair, Cuff Size: Adult Regular)  Pulse 89  Ht 1.6 m (5' 3\")  Wt 79.4 kg (175 lb 1.6 oz)  SpO2 98%  BMI 31.02 kg/m2  General: appears comfortable, alert and articulate  Head: normocephalic, atraumatic  Eyes: anicteric sclera, EOMI  Neck: no adenopathy  Orophyarynx: moist mucosa, no lesions, dentition intact  Heart: regular, S1/S2, no murmur, gallop, rub, estimated JVP 14cm with +JVR  Lungs: clear, no rales or wheezing  Abdomen: soft, non-tender, bowel sounds present, no hepatosplenomegaly  Extremities: no clubbing, cyanosis or edema  Neurological: normal speech and affect, no gross motor deficits    Labs:  CBC " RESULTS:  Lab Results   Component Value Date    WBC 11.6 (H) 04/15/2018    RBC 5.39 04/15/2018    HGB 15.4 04/15/2018    HCT 46.8 04/15/2018    MCV 87 04/15/2018    MCH 28.6 04/15/2018    MCHC 32.9 04/15/2018    RDW 15.6 (H) 04/15/2018     04/15/2018       CMP RESULTS:  Lab Results   Component Value Date     07/11/2018    POTASSIUM 3.9 07/11/2018    CHLORIDE 107 07/11/2018    CO2 28 07/11/2018    ANIONGAP 6 07/11/2018    GLC 94 07/11/2018    BUN 17 07/11/2018    CR 1.66 (H) 07/11/2018    GFRESTIMATED 47 (L) 07/11/2018    GFRESTBLACK 57 (L) 07/11/2018    SAMARA 9.5 07/11/2018    BILITOTAL 1.3 04/05/2018    ALBUMIN 3.1 (L) 04/05/2018    ALKPHOS 76 04/05/2018    ALT 58 04/05/2018    AST 57 (H) 04/05/2018        INR RESULTS:  No results found for: INR    Lab Results   Component Value Date    MAG 1.9 04/15/2018     No results found for: NTBNPI  Lab Results   Component Value Date    NTBNP 3640 (H) 05/08/2018       Assessment and Plan:   1. 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    Stage C  NYHA Class II  ACEi/ARB yes  BB titration ongoing - coreg dose increased to 9.375 BID.  Aldosterone antagonist deferred while other medical therapy is prioritized  SCD prophylaxis decision deferred during medication uptitration  % BiV pacing: N/A  Fluid status : Hypervolemic: will add lasix 20mg daily x 3 days      2. Meth use: discussed treatment - pt encouraged to attend treatment.    3. Ongoing tobacco use: discussed cessation - however, he is not interested at this time.  his meth use is the bigger concern at this time.     4. CKD: suspect due to low output. Stable renal function         Follow-up in 1 week.    CC  Patient Care Team:  No Ref-Primary, Physician as PCP - Corina Tavares RN as Nurse Coordinator (Cardiology)  Enedina hCirinos APRN CNP as Nurse Practitioner (Cardiology)  Flores, Cuca, RN as Nurse Coordinator (Cardiology)  ENEDINA CHIRINOS

## 2018-07-11 NOTE — MR AVS SNAPSHOT
"              After Visit Summary   2018    Palomo Patterson    MRN: [id number]           Patient Information     Date Of Birth          1980        Visit Information        Provider Department      2018 1:00 PM Yoselin Victoria APRN [facility] Heart Care        Today's Diagnoses     Acute on chronic combined systolic and diastolic heart failure (H)        Acute on chronic combined systolic and diastolic congestive heart failure (H)        Biventricular heart failure        Benign essential hypertension          Care Instructions    You were seen today in the Cardiovascular Clinic at the [facility].     Cardiology Providers you saw during your visit: Yoselin Victoria NP     1. Take furosemide 20mg daily for the next 3 days.  2. Cut back on the amount of gatorade you are drinking  3. Cut back on methamphetamine use    Follow up in 1 week with CORE clinic.           Please limit your fluid intake to 2 L (64 ounces) daily.  2 Liters a day = 8.5 cups, or 64 ounces.  Please limit your salt intake to 2 grams a day or less.     If you gain 2# in 24 hours or 5# in one week call Corina Cuellar RN so we can adjust your medications as needed over the phone.     Please feel free to call me with any questions or concerns.       Corina Cuellar RN BSN [facility]  [facility]  Cardiology Care Coordinator-Heart Failure Clinic     Questions and [phone].   First press #1 for the [employer] and then press #3 for \"Medical Questions\" to reach us Cardiology Nurses.      On Call Cardiologist for after hours or on weekends: [phone]   option #4 and ask to speak to the on-call Cardiologist. Inform them you are a CORE/heart failure patient at the [facility].           If you need a medication refill please contact your pharmacy.  Please allow 3 business days for your refill to be completed.  _______________________________________________________  C.O.R.E. CLINIC Cardiomyopathy, " Optimization, Rehabilitation, Education   The C.O.R.E. CLINIC is a heart failure specialty clinic within the [facility] where you will work with specialized nurse practitioners dedicated to helping patients with heart failure carefully adjust medications, receive education, and learn who and when to call if symptoms develop. They specialize in helping you better understand your condition, slow the progression of your disease, improve the length and quality of your life, help you detect future heart problems before they become life threatening, and avoid hospitalizations.  As always, thank you for trusting us with your health care needs!          Follow-ups after your visit        Additional Services     Follow-Up with CORE Clinic       F/u in 1 week with nasim Victoria in [facility] clinic                  Your next 10 appointments already scheduled     Jul 17, 2018  5:00 PM CDT   Lab with [facility]   [facility])    [address] [place] [address]  1st Floor  [facility] [id number] [phone]            Jul 17, 2018  5:30 PM CDT   (Arrive by 5:15 PM)   CORE RETURN with GARRETT Mata [facility]    [address] [place] [address]  [address] [place] [id number] [phone]              Future tests that were ordered for you today     Open Future Orders        Priority Expected Expires Ordered    Follow-Up with [facility] Routine 7/17/2018 10/16/2018 7/11/2018            Who to contact     If you have questions or need follow up information about today's clinic visit or your schedule please contact [facility] directly at [phone].  Normal or non-critical lab and imaging results will be communicated to you by MyChart, letter or phone within 4 business days after the clinic has received the results. If you do not hear from us within 7 days, please contact the clinic through MyChart or phone.  "If you have a critical or abnormal lab result, we will notify you by phone as soon as possible.  Submit refill requests through [employer] or call your pharmacy and they will forward the refill request to us. Please allow 3 business days for your refill to be completed.          Additional Information About Your Visit        Care EveryWhere ID     This is your Care EveryWhere ID. This could be used by other organizations to access your [place] medical records  [id number]        Your Vitals Were     Pulse Height Pulse Oximetry BMI (Body Mass Index)          89 1.6 m (5' 3\") 98% 31.02 kg/m2         Blood Pressure from Last 3 Encounters:   07/11/18 120/77   06/21/18 121/80   05/30/18 121/82    Weight from Last 3 Encounters:   07/11/18 79.4 kg (175 lb 1.6 oz)   06/21/18 78.9 kg (173 lb 14.4 oz)   05/30/18 77 kg (169 lb 12.8 oz)              We Performed the Following     Follow-Up with CORE Clinic          Today's Medication Changes          These changes are accurate as of 7/11/18  1:55 PM.  If you have any questions, ask your nurse or doctor.               These medicines have changed or have updated prescriptions.        Dose/Directions    losartan 100 MG tablet   Commonly known as:  COZAAR   This may have changed:  medication strength   Used for:  Acute on chronic combined systolic and diastolic congestive heart failure (H)   Changed by:  Yoselin Victoria APRN CNP        Take 50mg every morning and 100mg every night   Quantity:  90 tablet   Refills:  3            Where to get your medicines      These medications were sent to [facility]/pharmacy #[id number] - [place], [place] - [address] [place] [id number]     Phone:  [phone]     amLODIPine 2.5 MG tablet    carvedilol 6.25 MG tablet    losartan 100 MG tablet                Primary Care Provider Fax #    Physician No Ref-Primary [phone]       No address on file        Equal Access to Services     RAMIREZ KOVACS AH: Radha burton " Maryanneemory, portiada luqadaha, juniorta karobert hardin, blue lopez shobharubens hernándezrigo jose francisco. So [facility] [phone].    ATENCIÓN: Si adis العراقي, tiene a villavicencio disposición servicios gratuitos de asistencia lingüística. Jamar al [phone].    We comply with applicable federal civil rights laws and [place] laws. We do not discriminate on the basis of race, color, national origin, age, disability, sex, sexual orientation, or gender identity.            Thank you!     Thank you for choosing [facility]  for your care. Our goal is always to provide you with excellent care. Hearing back from our patients is one way we can continue to improve our services. Please take a few minutes to complete the written survey that you may receive in the mail after your visit with us. Thank you!             Your Updated Medication List - Protect others around you: Learn how to safely use, store and throw away your medicines at www.disposemymeds.org.          This list is accurate as of 7/11/18  1:55 PM.  Always use your most recent med list.                   Brand Name Dispense Instructions for use Diagnosis    amLODIPine 2.5 MG tablet    NORVASC    90 tablet    Take 1 tablet (2.5 mg) by mouth At Bedtime    Acute on chronic combined systolic and diastolic heart failure (H), Acute on chronic combined systolic and diastolic congestive heart failure (H), Biventricular heart failure, Benign essential hypertension       benzonatate 100 MG capsule    TESSALON    25 capsule    Take 2 capsules (200 mg) by mouth 3 times daily as needed for cough    Acute on chronic combined systolic and diastolic congestive heart failure (H)       * carvedilol 3.125 MG tablet    COREG    60 tablet    Take1 tablets in the am, 1 tablets in the pm.    Acute on chronic combined systolic and diastolic congestive heart failure (H)       * carvedilol 6.25 MG tablet    COREG    180 tablet    Take 1 tablet (6.25 mg) by mouth 2 times daily (with meals) 1 po  in the morning/ 1 po int he afternoon    Acute on chronic combined systolic and diastolic heart failure (H)       furosemide 20 MG tablet    LASIX    30 tablet    Take 1 tablet (20 mg) by mouth daily    Acute on chronic combined systolic and diastolic heart failure (H)       hydrALAZINE 100 MG Tabs tablet    APRESOLINE    120 tablet    Take 1 tablet (100 mg) by mouth 3 times daily    Acute on chronic combined systolic and diastolic heart failure (H), Biventricular heart failure       isosorbide mononitrate 60 MG 24 hr tablet    IMDUR    90 tablet    TAKE 1 TABLET BY MOUTH EVERY MORNING    Acute on chronic combined systolic and diastolic congestive heart failure (H), Acute on chronic combined systolic and diastolic heart failure (H), Benign essential hypertension       losartan 100 MG tablet    COZAAR    90 tablet    Take 50mg every morning and 100mg every night    Acute on chronic combined systolic and diastolic congestive heart failure (H)       * Notice:  This list has 2 medication(s) that are the same as other medications prescribed for you. Read the directions carefully, and ask your doctor or other care provider to review them with you.

## 2018-07-11 NOTE — NURSING NOTE
Chief Complaint   Patient presents with     Follow Up For     Return for HF F/U post COR/RHC Yesterday     Vitals were taken and medications were reconciled.     CARLA Omalley  1:18 PM

## 2018-07-11 NOTE — LETTER
7/11/2018      RE: Palomo Patterson  [address]  [place] [address]       Dear Colleague,    Thank you for the opportunity to participate in the care of your patient, Palomo Patterson, at the [facility] HEART [facility]. Please see a copy of my visit note below.    HPI: 37 yr old male patient returns to CORE clinic for follow up. 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 He unfortunately is still  using meth - statating he is using it 2-3x/week. He is also using tobacco 3-5 cigarettes/day. He is watching the sodium in his diet, avoiding high sodium foods, although indicates he is drinking gatorade instead of water when he plays soccer.   He c/o of a cough - this is at times productive of clear sputum. He has been working on his car, and thinks the cough is due to his sanding his car.  He is continue to be able to play soccer with some older men, - he states it is low key.   At his last visit, his coreg dose was increased and he is tolerating this without difficulty.  Pt denies SOB, orthopnea, PND, chest pain, belly bloating, loss of appetite, LE edema. Pt states energy level is good.     PAST MEDICAL HISTORY:  Past Medical History:   Diagnosis Date     Hypertension        FAMILY HISTORY:  Family History   Problem Relation Age of Onset     Depression Mother      Anxiety Disorder Mother      Myocardial Infarction Mother      age of onset unknown     Myocardial Infarction Father      age of onset unknown     Cardiac Sudden Death Brother      had sudden cardiac death at 36       SOCIAL HISTORY:  Social History     Social History     Marital status: Single     Spouse name: N/A     Number of children: N/A     Years of education: N/A     Social History Main Topics     Smoking status: Current Some Day Smoker     Packs/day: 0.25     Types: Cigarettes     Smokeless tobacco: Never Used     Alcohol use No      Comment: social events only 1 drink 3-4 times per year      "Drug use: 3.00 per week     Special: Methamphetamines      Comment: 1 gram 2-3 times per week     Sexual activity: Not Asked     Other Topics Concern     None     Social History Narrative       CURRENT MEDICATIONS:  Current Outpatient Prescriptions   Medication Sig Dispense Refill     amLODIPine (NORVASC) 2.5 MG tablet Take 1 tablet (2.5 mg) by mouth At Bedtime 30 tablet 1     carvedilol (COREG) 3.125 MG tablet Take1 tablets in the am, 1 tablets in the pm. 60 tablet 1     carvedilol (COREG) 6.25 MG tablet Take 6.25 mg by mouth 2 times daily (with meals) 1 po in the morning/ 1 po int he afternoon       hydrALAZINE (APRESOLINE) 100 MG TABS tablet Take 1 tablet (100 mg) by mouth 3 times daily 120 tablet 1     isosorbide mononitrate (IMDUR) 60 MG 24 hr tablet TAKE 1 TABLET BY MOUTH EVERY MORNING 90 tablet 3     losartan (COZAAR) 50 MG tablet Take 50mg every morning and 100mg every night 90 tablet 1     benzonatate (TESSALON) 100 MG capsule Take 2 capsules (200 mg) by mouth 3 times daily as needed for cough (Patient not taking: Reported on 7/11/2018) 25 capsule 1       ROS:   Constitutional: No fever, chills, or sweats. No weight gain/loss.   ENT: No visual disturbance, ear ache, epistaxis, sore throat.   Allergies/Immunologic: Negative.   Respiratory: No cough, hemoptysis.   Cardiovascular: As per HPI.   GI: No nausea, vomiting, hematemesis, melena, or hematochezia.   : No urinary frequency, dysuria, or hematuria.   Integument: Negative.   Psychiatric: Negative.   Neuro: Negative.   Endocrinology: Negative.   Musculoskeletal: Negative.    EXAM:  /77 (BP Location: Left arm, Patient Position: Chair, Cuff Size: Adult Regular)  Pulse 89  Ht 1.6 m (5' 3\")  Wt 79.4 kg (175 lb 1.6 oz)  SpO2 98%  BMI 31.02 kg/m2  General: appears comfortable, alert and articulate  Head: normocephalic, atraumatic  Eyes: anicteric sclera, EOMI  Neck: no adenopathy  Orophyarynx: moist mucosa, no lesions, dentition intact  Heart: " regular, S1/S2, no murmur, gallop, rub, estimated JVP 14cm with +JVR  Lungs: clear, no rales or wheezing  Abdomen: soft, non-tender, bowel sounds present, no hepatosplenomegaly  Extremities: no clubbing, cyanosis or edema  Neurological: normal speech and affect, no gross motor deficits    Labs:  CBC RESULTS:  Lab Results   Component Value Date    WBC 11.6 (H) 04/15/2018    RBC 5.39 04/15/2018    HGB 15.4 04/15/2018    HCT 46.8 04/15/2018    MCV 87 04/15/2018    MCH 28.6 04/15/2018    MCHC 32.9 04/15/2018    RDW 15.6 (H) 04/15/2018     04/15/2018       CMP RESULTS:  Lab Results   Component Value Date     07/11/2018    POTASSIUM 3.9 07/11/2018    CHLORIDE 107 07/11/2018    CO2 28 07/11/2018    ANIONGAP 6 07/11/2018    GLC 94 07/11/2018    BUN 17 07/11/2018    CR 1.66 (H) 07/11/2018    GFRESTIMATED 47 (L) 07/11/2018    GFRESTBLACK 57 (L) 07/11/2018    SAMARA 9.5 07/11/2018    BILITOTAL 1.3 04/05/2018    ALBUMIN 3.1 (L) 04/05/2018    ALKPHOS 76 04/05/2018    ALT 58 04/05/2018    AST 57 (H) 04/05/2018        INR RESULTS:  No results found for: INR    Lab Results   Component Value Date    MAG 1.9 04/15/2018     No results found for: NTBNPI  Lab Results   Component Value Date    NTBNP 3640 (H) 05/08/2018       Assessment and Plan:   1. 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    Stage C  NYHA Class II  ACEi/ARB yes  BB titration ongoing - coreg dose increased to 9.375 BID.  Aldosterone antagonist deferred while other medical therapy is prioritized  SCD prophylaxis decision deferred during medication uptitration  % BiV pacing: N/A  Fluid status : Hypervolemic: will add lasix 20mg daily x 3 days      2. Meth use: discussed treatment - pt encouraged to attend treatment.    3. Ongoing tobacco use: discussed cessation - however, he is not interested at this time.  his meth use is the bigger concern at this time.     4. CKD: suspect due to low output. Stable renal function          Follow-up in 1 week.    Please do not hesitate to contact me if you have any questions/concerns.     Sincerely,     GARRETT Mata CNP    CC  Patient Care Team:  No Ref-Primary, Physician as PCP - General  Corina Cuellar, RN as Nurse Coordinator (Cardiology)  Enedina Victoria APRN CNP as Nurse Practitioner (Cardiology)  Cuca Flores RN as Nurse Coordinator (Cardiology)  ENEDINA VICTORIA

## 2018-07-11 NOTE — PATIENT INSTRUCTIONS
"You were seen today in the Cardiovascular Clinic at the [facility].     Cardiology Providers you saw during your visit: Yoselin Victoria NP     1. Take furosemide 20mg daily for the next 3 days.  2. Cut back on the amount of gatorade you are drinking  3. Cut back on methamphetamine use    Follow up in 1 week with CORE clinic.           Please limit your fluid intake to 2 L (64 ounces) daily.  2 Liters a day = 8.5 cups, or 64 ounces.  Please limit your salt intake to 2 grams a day or less.     If you gain 2# in 24 hours or 5# in one week call Corina Cuellar RN so we can adjust your medications as needed over the phone.     Please feel free to call me with any questions or concerns.       Corina Cuellar RN BSN [facility]  [facility]  Cardiology Care Coordinator-Heart Failure Clinic     Questions and [phone].   First press #1 for the [employer] and then press #3 for \"Medical Questions\" to reach us Cardiology Nurses.      On Call Cardiologist for after hours or on weekends: [phone]   option #4 and ask to speak to the on-call Cardiologist. Inform them you are a CORE/heart failure patient at the [facility].           If you need a medication refill please contact your pharmacy.  Please allow 3 business days for your refill to be completed.  _______________________________________________________  C.O.R.E. CLINIC Cardiomyopathy, Optimization, Rehabilitation, Education   The C.O.R.E. CLINIC is a heart failure specialty clinic within the [facility] where you will work with specialized nurse practitioners dedicated to helping patients with heart failure carefully adjust medications, receive education, and learn who and when to call if symptoms develop. They specialize in helping you better understand your condition, slow the progression of your disease, improve the length and quality of your life, help you detect future heart problems " before they become life threatening, and avoid hospitalizations.  As always, thank you for trusting us with your health care needs!

## 2018-07-14 ENCOUNTER — PRE VISIT (OUTPATIENT)
Dept: CARDIOLOGY | Facility: CLINIC | Age: 38
End: 2018-07-14

## 2018-07-14 DIAGNOSIS — I50.82 BIVENTRICULAR HEART FAILURE (H): Primary | ICD-10-CM

## 2018-07-19 ENCOUNTER — TELEPHONE (OUTPATIENT)
Dept: CARDIOLOGY | Facility: CLINIC | Age: 38
End: 2018-07-19

## 2018-07-19 DIAGNOSIS — Z53.9 NO SHOW: Primary | ICD-10-CM

## 2018-07-19 NOTE — TELEPHONE ENCOUNTER
Health Call Center    Phone Message    May a detailed message be left on voicemail: no    Reason for Call: Other: Pt's s/o Parish called to see if the Pt could get a sooner appt in the CORE clinic with Yoselin Victoria. Pt is currently scheduled for 8/14/18. Please call her at [phone]     Action Taken: Message routed to:  [facility] ([facility]): CLINIC COORDINATORS CARD [place]

## 2018-08-07 ENCOUNTER — TELEPHONE (OUTPATIENT)
Dept: CARDIOLOGY | Facility: CLINIC | Age: 38
End: 2018-08-07

## 2018-08-07 NOTE — TELEPHONE ENCOUNTER
[facility]    Phone Message    May a detailed message be left on voicemail: no    Reason for Call: Other: Pt's s/o Parish called to request a sooner appt for the Pt with Yoselin Victoria or another provider, with Yoselin preferred. Pt currently scheduled on 8/14/18. Please contact her to discuss.     Action Taken: Message routed to:  [facility] ([facility]): CLINIC COORDINATORS CARDIO

## 2018-08-08 NOTE — TELEPHONE ENCOUNTER
[facility] Call [facility]    Phone Message    May a detailed message be left on voicemail: yes    Reason for Call: Other: Parish is calling in again to see if pt can be seen sooner. It is okay to leave a message with date and time.     Action Taken: Message routed to:  [facility] ([facility]): Cardiology

## 2018-08-09 NOTE — TELEPHONE ENCOUNTER
[facility]    Phone Message    May a detailed message be left on voicemail: yes    Reason for Call: Other: Per call from Parish is requesting for a call back to discuss PT's cough symptom and if PT should start taking his furosemide (LASIX) 20 MG tablet again ASAP. Per Parish is still waiting for a call back to hermes sooner APPT as well.  She can be reached at the above #     Action Taken: Message routed to:  [facility]): Cardiology

## 2018-08-14 ENCOUNTER — OFFICE VISIT (OUTPATIENT)
Dept: CARDIOLOGY | Facility: CLINIC | Age: 38
End: 2018-08-14
Attending: NURSE PRACTITIONER
Payer: COMMERCIAL

## 2018-08-14 VITALS
BODY MASS INDEX: 30.65 KG/M2 | DIASTOLIC BLOOD PRESSURE: 81 MMHG | HEART RATE: 86 BPM | OXYGEN SATURATION: 98 % | SYSTOLIC BLOOD PRESSURE: 122 MMHG | WEIGHT: 173 LBS | HEIGHT: 63 IN

## 2018-08-14 DIAGNOSIS — I50.22 CHRONIC SYSTOLIC CONGESTIVE HEART FAILURE (H): Primary | ICD-10-CM

## 2018-08-14 DIAGNOSIS — I50.82 BIVENTRICULAR HEART FAILURE (H): ICD-10-CM

## 2018-08-14 LAB
ANION GAP SERPL CALCULATED.3IONS-SCNC: 7 MMOL/L (ref 3–14)
BUN SERPL-MCNC: 18 MG/DL (ref 7–30)
CALCIUM SERPL-MCNC: 8.5 MG/DL (ref 8.5–10.1)
CHLORIDE SERPL-SCNC: 108 MMOL/L (ref 94–109)
CO2 SERPL-SCNC: 27 MMOL/L (ref 20–32)
CREAT SERPL-MCNC: 1.72 MG/DL (ref 0.66–1.25)
GFR SERPL CREATININE-BSD FRML MDRD: 45 ML/MIN/1.7M2
GLUCOSE SERPL-MCNC: 100 MG/DL (ref 70–99)
POTASSIUM SERPL-SCNC: 3.8 MMOL/L (ref 3.4–5.3)
SODIUM SERPL-SCNC: 142 MMOL/L (ref 133–144)

## 2018-08-14 PROCEDURE — 80048 BASIC METABOLIC PNL TOTAL CA: CPT | Performed by: NURSE PRACTITIONER

## 2018-08-14 PROCEDURE — G0463 HOSPITAL OUTPT CLINIC VISIT: HCPCS | Mod: ZF

## 2018-08-14 PROCEDURE — 99214 OFFICE O/P EST MOD 30 MIN: CPT | Mod: ZP | Performed by: NURSE PRACTITIONER

## 2018-08-14 PROCEDURE — 36415 COLL VENOUS BLD VENIPUNCTURE: CPT | Performed by: NURSE PRACTITIONER

## 2018-08-14 ASSESSMENT — PAIN SCALES - GENERAL: PAINLEVEL: NO PAIN (0)

## 2018-08-14 NOTE — MR AVS SNAPSHOT
"              After Visit Summary   2018    Javi Patterson    MRN: [id number]           Patient Information     Date Of Birth          1980        Visit Information        Provider Department      2018 6:30 PM Yoselin Victoria APRN CNP [facility]        Today's Diagnoses     Chronic systolic congestive heart failure (H)    -  1      Care Instructions    You were seen today in the Cardiovascular Clinic at the [facility].     Cardiology Providers you saw during your visit: GARRETT Caballero CNP     Follow up and medication changes:        Results for JAVI PATTERSON (MRN [id number]) as of 2018 19:07   Ref. Range 2018 18:38   Sodium Latest Ref Range: 133 - 144 mmol/L 142   Potassium Latest Ref Range: 3.4 - 5.3 mmol/L 3.8   Chloride Latest Ref Range: 94 - 109 mmol/L 108   Carbon Dioxide Latest Ref Range: 20 - 32 mmol/L 27   Urea Nitrogen Latest Ref Range: 7 - 30 mg/dL 18   Creatinine Latest Ref Range: 0.66 - 1.25 mg/dL 1.72 (H)   GFR Estimate Latest Ref Range: >60 mL/min/1.7m2 45 (L)   GFR Estimate If Black Latest Ref Range: >60 mL/min/1.7m2 54 (L)   Calcium Latest Ref Range: 8.5 - 10.1 mg/dL 8.5   Anion Gap Latest Ref Range: 3 - 14 mmol/L 7   Glucose Latest Ref Range: 70 - 99 mg/dL 100 (H)         Please limit your fluid intake to 2 L (68 ounces) daily.  2 Liters a day = 8.5 cups, or 68 ounces.  Please limit your salt intake to 2 grams a day or less.     If you gain 2# in 24 hours or 5# in one week call Cuca Flores RN so we can adjust your medications as needed over the phone.     Please feel free to call me with any questions or concerns.       Cuca Flores RN [facility]  [facility]  Cardiology Care Coordinator-Heart Failure Clinic     Questions and [phone].   First press #1 for the University and then press #3 for \"Medical Questions\" to reach us Cardiology Nurses.      On Call Cardiologist for after hours or on weekends: " [phone]   option #4 and ask to speak to the on-call Cardiologist. Inform them you are a CORE/heart failure patient at the [place].           If you need a medication refill please contact your pharmacy.  Please allow 3 business days for your refill to be completed.  _______________________________________________________  C.O.R.E. CLINIC Cardiomyopathy, Optimization, Rehabilitation, Education   The C.O.R.E. CLINIC is a heart failure specialty clinic within the [facility] where you will work with specialized nurse practitioners dedicated to helping patients with heart failure carefully adjust medications, receive education, and learn who and when to call if symptoms develop. They specialize in helping you better understand your condition, slow the progression of your disease, improve the length and quality of your life, help you detect future heart problems before they become life threatening, and avoid hospitalizations.  As always, thank you for trusting us with your health care needs!             Follow-ups after your visit        Future tests that were ordered for you today     Open Future Orders        Priority Expected Expires Ordered    Echocardiogram Complete Routine 8/14/2018 8/21/2019 8/14/2018    X-ray Chest 2 vws* Routine 8/14/2018 8/14/2019 8/14/2018            Who to contact     If you have questions or need follow up information about today's clinic visit or your schedule please contact [facility] directly at [phone].  Normal or non-critical lab and imaging results will be communicated to you by MyChart, letter or phone within 4 business days after the clinic has received the results. If you do not hear from us within 7 days, please contact the clinic through MyChart or phone. If you have a critical or abnormal lab result, we will notify you by phone as soon as possible.  Submit refill requests through [employer] or call your pharmacy and they will  "forward the refill request to us. Please allow 3 business days for your refill to be completed.          Additional Information About Your Visit        Care EveryWhere ID     This is your Care EveryWhere ID. This could be used by other organizations to access your [place] medical records  [id number]        Your Vitals Were     Pulse Height Pulse Oximetry BMI (Body Mass Index)          86 1.6 m (5' 3\") 98% 30.65 kg/m2         Blood Pressure from Last 3 Encounters:   08/14/18 122/81   07/11/18 120/77   06/21/18 121/80    Weight from Last 3 Encounters:   08/14/18 78.5 kg (173 lb)   07/11/18 79.4 kg (175 lb 1.6 oz)   06/21/18 78.9 kg (173 lb 14.4 oz)               Primary Care Provider Fax #    Physician No Ref-Primary [phone]       No address on file        Equal Access to Services     [facility] : Hadii adriel King, wawilbur harry, lupe kaalmatootie hardin, [place] . So [facility] [phone].    ATENCIÓN: Si habla español, tiene a villavicencio disposición servicios gratuitos de asistencia lingüística. Llame al [phone].    We comply with applicable federal civil rights laws and [place] laws. We do not discriminate on the basis of race, color, national origin, age, disability, sex, sexual orientation, or gender identity.            Thank you!     Thank you for choosing [facility]  for your care. Our goal is always to provide you with excellent care. Hearing back from our patients is one way we can continue to improve our services. Please take a few minutes to complete the written survey that you may receive in the mail after your visit with us. Thank you!             Your Updated Medication List - Protect others around you: Learn how to safely use, store and throw away your medicines at www.disposemymeds.org.          This list is accurate as of 8/14/18 11:59 PM.  Always use your most recent med list.                   Brand Name Dispense Instructions for use " Diagnosis    amLODIPine 2.5 MG tablet    NORVASC    90 tablet    Take 1 tablet (2.5 mg) by mouth At Bedtime    Acute on chronic combined systolic and diastolic heart failure (H), Acute on chronic combined systolic and diastolic congestive heart failure (H), Biventricular heart failure, Benign essential hypertension       benzonatate 100 MG capsule    TESSALON    25 capsule    Take 2 capsules (200 mg) by mouth 3 times daily as needed for cough    Acute on chronic combined systolic and diastolic congestive heart failure (H)       * carvedilol 3.125 MG tablet    COREG    60 tablet    Take1 tablets in the am, 1 tablets in the pm.    Acute on chronic combined systolic and diastolic congestive heart failure (H)       * carvedilol 6.25 MG tablet    COREG    180 tablet    Take 1 tablet (6.25 mg) by mouth 2 times daily (with meals) 1 po in the morning/ 1 po int he afternoon    Acute on chronic combined systolic and diastolic heart failure (H)       furosemide 20 MG tablet    LASIX    30 tablet    Take 1 tablet (20 mg) by mouth daily    Acute on chronic combined systolic and diastolic heart failure (H)       hydrALAZINE 100 MG Tabs tablet    APRESOLINE    120 tablet    Take 1 tablet (100 mg) by mouth 3 times daily    Acute on chronic combined systolic and diastolic heart failure (H), Biventricular heart failure       isosorbide mononitrate 60 MG 24 hr tablet    IMDUR    90 tablet    TAKE 1 TABLET BY MOUTH EVERY MORNING    Acute on chronic combined systolic and diastolic congestive heart failure (H), Acute on chronic combined systolic and diastolic heart failure (H), Benign essential hypertension       losartan 100 MG tablet    COZAAR    90 tablet    Take 50mg every morning and 100mg every night    Acute on chronic combined systolic and diastolic congestive heart failure (H)       * Notice:  This list has 2 medication(s) that are the same as other medications prescribed for you. Read the directions carefully, and ask your  doctor or other care provider to review them with you.

## 2018-08-14 NOTE — PATIENT INSTRUCTIONS
"You were seen today in the Cardiovascular Clinic at the [facility].     Cardiology Providers you saw during your visit: GARRETT Caballero CNP     Follow up and medication changes:        Results for JAVI CASTELLANO (MRN [id number]) as of 2018 19:07   Ref. Range 2018 18:38   Sodium Latest Ref Range: 133 - 144 mmol/L 142   Potassium Latest Ref Range: 3.4 - 5.3 mmol/L 3.8   Chloride Latest Ref Range: 94 - 109 mmol/L 108   Carbon Dioxide Latest Ref Range: 20 - 32 mmol/L 27   Urea Nitrogen Latest Ref Range: 7 - 30 mg/dL 18   Creatinine Latest Ref Range: 0.66 - 1.25 mg/dL 1.72 (H)   GFR Estimate Latest Ref Range: >60 mL/min/1.7m2 45 (L)   GFR Estimate If Black Latest Ref Range: >60 mL/min/1.7m2 54 (L)   Calcium Latest Ref Range: 8.5 - 10.1 mg/dL 8.5   Anion Gap Latest Ref Range: 3 - 14 mmol/L 7   Glucose Latest Ref Range: 70 - 99 mg/dL 100 (H)         Please limit your fluid intake to 2 L (68 ounces) daily.  2 Liters a day = 8.5 cups, or 68 ounces.  Please limit your salt intake to 2 grams a day or less.     If you gain 2# in 24 hours or 5# in one week call Cuca Flores RN so we can adjust your medications as needed over the phone.     Please feel free to call me with any questions or concerns.       Cuca Flores RN CHFN  [facility]  Cardiology Care Coordinator-Heart Failure Clinic     Questions and [phone].   First press #1 for the [employer] and then press #3 for \"Medical Questions\" to reach us Cardiology Nurses.      On Call Cardiologist for after hours or on weekends: [phone]   option #4 and ask to speak to the on-call Cardiologist. Inform them you are a CORE/heart failure patient at the [place].           If you need a medication refill please contact your pharmacy.  Please allow 3 business days for your refill to be completed.  _______________________________________________________  C.O.R.E. CLINIC Cardiomyopathy, Optimization, " Rehabilitation, Education   The C.O.R.E. CLINIC is a heart failure specialty clinic within the [facility] where you will work with specialized nurse practitioners dedicated to helping patients with heart failure carefully adjust medications, receive education, and learn who and when to call if symptoms develop. They specialize in helping you better understand your condition, slow the progression of your disease, improve the length and quality of your life, help you detect future heart problems before they become life threatening, and avoid hospitalizations.  As always, thank you for trusting us with your health care needs!

## 2018-08-14 NOTE — LETTER
8/14/2018      RE: Palomo Patterson  [address]  [place] [address]       Dear Colleague,    Thank you for the opportunity to participate in the care of your patient, Palomo Patterson, at the [facility] HEART [facility] at [facility]. Please see a copy of my visit note below.    HPI: 37 yr old male patient returns to CORE clinic for follow up. 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 He unfortunately is still  using meth - statating he is using it 2-3x/week. He is also using tobacco 3-5 cigarettes/day. He is watching the sodium in his diet, avoiding high sodium foods, although indicates he is drinking gatorade instead of water when he plays soccer.   Pt denies SOB, orthopnea, PND, chest pain, belly bloating, loss of appetite, LE edema. Pt states energy level is good.     His biggest concern today is the ongoing cough. At his last visit his diuretic was increased x 3 days. He did not notice a change in his cough. 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 He also sleeps in a benjamin basement.     PAST MEDICAL HISTORY:  Past Medical History:   Diagnosis Date     Hypertension        FAMILY HISTORY:  Family History   Problem Relation Age of Onset     Depression Mother      Anxiety Disorder Mother      Myocardial Infarction Mother      age of onset unknown     Myocardial Infarction Father      age of onset unknown     Cardiac Sudden Death Brother      had sudden cardiac death at 36       SOCIAL HISTORY:  Social History     Social History     Marital status: Single     Spouse name: N/A     Number of children: N/A     Years of education: N/A     Social History Main Topics     Smoking status: Current Some Day Smoker     Packs/day: 0.25     Types: Cigarettes     Smokeless tobacco: Never Used     Alcohol use No      Comment: social events only 1 drink 3-4 times per year     Drug use: 3.00 per week     Special: Methamphetamines      Comment: 1  "gram 2-3 times per week     Sexual activity: Not Asked     Other Topics Concern     None     Social History Narrative       CURRENT MEDICATIONS:  Current Outpatient Prescriptions   Medication Sig Dispense Refill     amLODIPine (NORVASC) 2.5 MG tablet Take 1 tablet (2.5 mg) by mouth At Bedtime 90 tablet 3     carvedilol (COREG) 3.125 MG tablet Take1 tablets in the am, 1 tablets in the pm. 60 tablet 1     carvedilol (COREG) 6.25 MG tablet Take 1 tablet (6.25 mg) by mouth 2 times daily (with meals) 1 po in the morning/ 1 po int he afternoon 180 tablet 3     furosemide (LASIX) 20 MG tablet Take 1 tablet (20 mg) by mouth daily 30 tablet 1     hydrALAZINE (APRESOLINE) 100 MG TABS tablet Take 1 tablet (100 mg) by mouth 3 times daily 120 tablet 1     isosorbide mononitrate (IMDUR) 60 MG 24 hr tablet TAKE 1 TABLET BY MOUTH EVERY MORNING 90 tablet 3     losartan (COZAAR) 100 MG tablet Take 50mg every morning and 100mg every night 90 tablet 3     benzonatate (TESSALON) 100 MG capsule Take 2 capsules (200 mg) by mouth 3 times daily as needed for cough (Patient not taking: Reported on 7/11/2018) 25 capsule 1       ROS:   Constitutional: No fever, chills, or sweats. No weight gain/loss.   ENT: No visual disturbance, ear ache, epistaxis, sore throat.   Allergies/Immunologic: Negative.   Respiratory: No cough, hemoptysis.   Cardiovascular: As per HPI.   GI: No nausea, vomiting, hematemesis, melena, or hematochezia.   : No urinary frequency, dysuria, or hematuria.   Integument: Negative.   Psychiatric: Negative.   Neuro: Negative.   Endocrinology: Negative.   Musculoskeletal: Negative.    EXAM:  /81 (BP Location: Right arm, Patient Position: Chair, Cuff Size: Adult Regular)  Pulse 86  Ht 1.6 m (5' 3\")  Wt 78.5 kg (173 lb)  SpO2 98%  BMI 30.65 kg/m2  General: appears comfortable, alert and articulate  Head: normocephalic, atraumatic  Eyes: anicteric sclera, EOMI  Neck: no adenopathy  Orophyarynx: moist mucosa, no " lesions, dentition intact  Heart: regular, S1/S2, no murmur, gallop, rub, estimated JVP 14cm with +JVR  Lungs: clear, no rales or wheezing  Abdomen: soft, non-tender, bowel sounds present, no hepatosplenomegaly  Extremities: no clubbing, cyanosis or edema  Neurological: normal speech and affect, no gross motor deficits    Labs:  CBC RESULTS:  Lab Results   Component Value Date    WBC 11.6 (H) 04/15/2018    RBC 5.39 04/15/2018    HGB 15.4 04/15/2018    HCT 46.8 04/15/2018    MCV 87 04/15/2018    MCH 28.6 04/15/2018    MCHC 32.9 04/15/2018    RDW 15.6 (H) 04/15/2018     04/15/2018       CMP RESULTS:  Lab Results   Component Value Date     08/14/2018    POTASSIUM 3.8 08/14/2018    CHLORIDE 108 08/14/2018    CO2 27 08/14/2018    ANIONGAP 7 08/14/2018     (H) 08/14/2018    BUN 18 08/14/2018    CR 1.72 (H) 08/14/2018    GFRESTIMATED 45 (L) 08/14/2018    GFRESTBLACK 54 (L) 08/14/2018    SAMARA 8.5 08/14/2018    BILITOTAL 1.3 04/05/2018    ALBUMIN 3.1 (L) 04/05/2018    ALKPHOS 76 04/05/2018    ALT 58 04/05/2018    AST 57 (H) 04/05/2018        INR RESULTS:  No results found for: INR    Lab Results   Component Value Date    MAG 1.9 04/15/2018     No results found for: NTBNPI  Lab Results   Component Value Date    NTBNP 3640 (H) 05/08/2018       Assessment and Plan:   1. 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    Stage C  NYHA Class II  ACEi/ARB yes  BB titration ongoing - coreg dose increased to 9.375 BID.  Aldosterone antagonist deferred while other medical therapy is prioritized  SCD prophylaxis decision deferred during medication uptitration  % BiV pacing: N/A  Fluid status : euvolemic    2. Meth use: discussed treatment - pt encouraged to attend treatment.    3. Ongoing tobacco use: discussed cessation - however, he is not interested at this time.  his meth use is the bigger concern .     4. CKD: suspect due to low output. Stable renal function    5. Cough: unclear  etiology: will check CXR/echo to asses  Heart and lungs. Will see patient for follow up after testing.      Please do not hesitate to contact me if you have any questions/concerns.     Sincerely,     GARRETT Mata CNP    CC  Patient Care Team:  No Ref-Primary, Physician as PCP - Corina Tavares RN as Nurse Coordinator (Cardiology)  Enedina Victoria APRN CNP as Nurse Practitioner (Cardiology)  Cuca Flores, RN as Nurse Coordinator (Cardiology)  Jose Daniel Ewing RN as Nurse Coordinator (Cardiology)  ENEDINA VICTORIA

## 2018-08-14 NOTE — NURSING NOTE
Chief Complaint   Patient presents with     Follow Up For      Return CORE; 37yr old male with a h/o chronic biventricular heart failure presenting for HF follow up with labs prior.     Vitals were performed, medications were reconciled.  Isabell Deluna MA

## 2018-08-15 NOTE — PROGRESS NOTES
HPI: 37 yr old male patient returns to CORE clinic for follow up. 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 He unfortunately is still  using meth - statating he is using it 2-3x/week. He is also using tobacco 3-5 cigarettes/day. He is watching the sodium in his diet, avoiding high sodium foods, although indicates he is drinking gatorade instead of water when he plays soccer.   Pt denies SOB, orthopnea, PND, chest pain, belly bloating, loss of appetite, LE edema. Pt states energy level is good.     His biggest concern today is the ongoing cough. At his last visit his diuretic was increased x 3 days. He did not notice a change in his cough. 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 He also sleeps in a benjamin basement.     PAST MEDICAL HISTORY:  Past Medical History:   Diagnosis Date     Hypertension        FAMILY HISTORY:  Family History   Problem Relation Age of Onset     Depression Mother      Anxiety Disorder Mother      Myocardial Infarction Mother      age of onset unknown     Myocardial Infarction Father      age of onset unknown     Cardiac Sudden Death Brother      had sudden cardiac death at 36       SOCIAL HISTORY:  Social History     Social History     Marital status: Single     Spouse name: N/A     Number of children: N/A     Years of education: N/A     Social History Main Topics     Smoking status: Current Some Day Smoker     Packs/day: 0.25     Types: Cigarettes     Smokeless tobacco: Never Used     Alcohol use No      Comment: social events only 1 drink 3-4 times per year     Drug use: 3.00 per week     Special: Methamphetamines      Comment: 1 gram 2-3 times per week     Sexual activity: Not Asked     Other Topics Concern     None     Social History Narrative       CURRENT MEDICATIONS:  Current Outpatient Prescriptions   Medication Sig Dispense Refill     amLODIPine (NORVASC) 2.5 MG tablet Take 1 tablet (2.5 mg) by mouth At Bedtime 90 tablet 3      "carvedilol (COREG) 3.125 MG tablet Take1 tablets in the am, 1 tablets in the pm. 60 tablet 1     carvedilol (COREG) 6.25 MG tablet Take 1 tablet (6.25 mg) by mouth 2 times daily (with meals) 1 po in the morning/ 1 po int he afternoon 180 tablet 3     furosemide (LASIX) 20 MG tablet Take 1 tablet (20 mg) by mouth daily 30 tablet 1     hydrALAZINE (APRESOLINE) 100 MG TABS tablet Take 1 tablet (100 mg) by mouth 3 times daily 120 tablet 1     isosorbide mononitrate (IMDUR) 60 MG 24 hr tablet TAKE 1 TABLET BY MOUTH EVERY MORNING 90 tablet 3     losartan (COZAAR) 100 MG tablet Take 50mg every morning and 100mg every night 90 tablet 3     benzonatate (TESSALON) 100 MG capsule Take 2 capsules (200 mg) by mouth 3 times daily as needed for cough (Patient not taking: Reported on 7/11/2018) 25 capsule 1       ROS:   Constitutional: No fever, chills, or sweats. No weight gain/loss.   ENT: No visual disturbance, ear ache, epistaxis, sore throat.   Allergies/Immunologic: Negative.   Respiratory: No cough, hemoptysis.   Cardiovascular: As per HPI.   GI: No nausea, vomiting, hematemesis, melena, or hematochezia.   : No urinary frequency, dysuria, or hematuria.   Integument: Negative.   Psychiatric: Negative.   Neuro: Negative.   Endocrinology: Negative.   Musculoskeletal: Negative.    EXAM:  /81 (BP Location: Right arm, Patient Position: Chair, Cuff Size: Adult Regular)  Pulse 86  Ht 1.6 m (5' 3\")  Wt 78.5 kg (173 lb)  SpO2 98%  BMI 30.65 kg/m2  General: appears comfortable, alert and articulate  Head: normocephalic, atraumatic  Eyes: anicteric sclera, EOMI  Neck: no adenopathy  Orophyarynx: moist mucosa, no lesions, dentition intact  Heart: regular, S1/S2, no murmur, gallop, rub, estimated JVP 14cm with +JVR  Lungs: clear, no rales or wheezing  Abdomen: soft, non-tender, bowel sounds present, no hepatosplenomegaly  Extremities: no clubbing, cyanosis or edema  Neurological: normal speech and affect, no gross motor " deficits    Labs:  CBC RESULTS:  Lab Results   Component Value Date    WBC 11.6 (H) 04/15/2018    RBC 5.39 04/15/2018    HGB 15.4 04/15/2018    HCT 46.8 04/15/2018    MCV 87 04/15/2018    MCH 28.6 04/15/2018    MCHC 32.9 04/15/2018    RDW 15.6 (H) 04/15/2018     04/15/2018       CMP RESULTS:  Lab Results   Component Value Date     08/14/2018    POTASSIUM 3.8 08/14/2018    CHLORIDE 108 08/14/2018    CO2 27 08/14/2018    ANIONGAP 7 08/14/2018     (H) 08/14/2018    BUN 18 08/14/2018    CR 1.72 (H) 08/14/2018    GFRESTIMATED 45 (L) 08/14/2018    GFRESTBLACK 54 (L) 08/14/2018    SAMARA 8.5 08/14/2018    BILITOTAL 1.3 04/05/2018    ALBUMIN 3.1 (L) 04/05/2018    ALKPHOS 76 04/05/2018    ALT 58 04/05/2018    AST 57 (H) 04/05/2018        INR RESULTS:  No results found for: INR    Lab Results   Component Value Date    MAG 1.9 04/15/2018     No results found for: NTBNPI  Lab Results   Component Value Date    NTBNP 3640 (H) 05/08/2018       Assessment and Plan:   1. 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    Stage C  NYHA Class II  ACEi/ARB yes  BB titration ongoing - coreg dose increased to 9.375 BID.  Aldosterone antagonist deferred while other medical therapy is prioritized  SCD prophylaxis decision deferred during medication uptitration  % BiV pacing: N/A  Fluid status : euvolemic    2. Meth use: discussed treatment - pt encouraged to attend treatment.    3. Ongoing tobacco use: discussed cessation - however, he is not interested at this time.  his meth use is the bigger concern .     4. CKD: suspect due to low output. Stable renal function    5. Cough: unclear etiology: will check CXR/echo to asses  Heart and lungs. Will see patient for follow up after testing.              CC  Patient Care Team:  No Ref-Primary, Physician as PCP - Corina Tavares RN as Nurse Coordinator (Cardiology)  Yoselin Victoria APRN CNP as Nurse Practitioner (Cardiology)  Mark,  Cuca, RN as Nurse Coordinator (Cardiology)  Jose Daniel Ewing, RN as Nurse Coordinator (Cardiology)  ENEDINA CHIRINOS

## 2018-08-28 ENCOUNTER — RADIANT APPOINTMENT (OUTPATIENT)
Dept: GENERAL RADIOLOGY | Facility: CLINIC | Age: 38
End: 2018-08-28
Attending: NURSE PRACTITIONER
Payer: COMMERCIAL

## 2018-08-28 ENCOUNTER — RADIANT APPOINTMENT (OUTPATIENT)
Dept: CARDIOLOGY | Facility: CLINIC | Age: 38
End: 2018-08-28
Payer: COMMERCIAL

## 2018-08-28 DIAGNOSIS — I50.22 CHRONIC SYSTOLIC CONGESTIVE HEART FAILURE (H): ICD-10-CM

## 2018-08-29 ENCOUNTER — TELEPHONE (OUTPATIENT)
Dept: CARDIOLOGY | Facility: CLINIC | Age: 38
End: 2018-08-29

## 2018-08-29 DIAGNOSIS — I50.82 BIVENTRICULAR HEART FAILURE (H): Primary | ICD-10-CM

## 2018-08-29 NOTE — TELEPHONE ENCOUNTER
Returned call to [facility]. No answer. Left message that there is no LUZ on file to provide information to her re: Palomo's medical information. Attempted to call Palomo but phone number has been disconnected.

## 2018-08-29 NOTE — TELEPHONE ENCOUNTER
Health Call Center    Phone Message    May a detailed message be left on voicemail: no    Reason for Call: Requesting Results   Name/type of test: Echo, X-ray  Date of test: 8/28/18  Was test done at a location other than [facility] (Please fill in the location if not [facility])?: No  Comments: Please call Pt's s/o Parish at [phone]      Action Taken: Message routed to:  [facility] ([facility]): [facility] CARDIOLOGY ADULT [facility]

## 2018-08-30 NOTE — TELEPHONE ENCOUNTER
[facility]    Phone Message    May a detailed message be left on voicemail: yes    Reason for Call: Other: Pt's girlfriend called. She said pt will be with her around 2. Please call after then and you can tell him his results.      Action Taken: Message routed to:  [facility] ([facility]): Cardiology

## 2018-08-30 NOTE — TELEPHONE ENCOUNTER
I attempted to call Palomo again, his phone number is disconnected.  I called his SO, Parish - and left her a voicemail asking if she can give an up-to-date phone number for him.  Will discuss recent test results when we get Palomo on the phone. Corina Cuellar RN

## 2018-09-04 ENCOUNTER — TELEPHONE (OUTPATIENT)
Dept: CARDIOLOGY | Facility: CLINIC | Age: 38
End: 2018-09-04

## 2018-09-04 NOTE — TELEPHONE ENCOUNTER
Health Call Center    Phone Message    May a detailed message be left on voicemail: no    Reason for Call: Parish Suarez called to cancel appointment for today but was on virtual hold and by the time a call was returned it was 4:40PM. Patient's had multiple miss appointments and they just want to make sure it's know that this appointment was meant to be cancelled before 4PM but due to the long hold time they weren't able to cancel the appt ahead of time.   Action Taken: Message routed to:  [facility]): Cardiology

## 2018-09-18 ENCOUNTER — OFFICE VISIT (OUTPATIENT)
Dept: CARDIOLOGY | Facility: CLINIC | Age: 38
End: 2018-09-18
Attending: NURSE PRACTITIONER
Payer: COMMERCIAL

## 2018-09-18 VITALS
HEIGHT: 63 IN | OXYGEN SATURATION: 99 % | BODY MASS INDEX: 31.01 KG/M2 | DIASTOLIC BLOOD PRESSURE: 114 MMHG | WEIGHT: 175 LBS | SYSTOLIC BLOOD PRESSURE: 158 MMHG | HEART RATE: 102 BPM

## 2018-09-18 DIAGNOSIS — I50.43 ACUTE ON CHRONIC COMBINED SYSTOLIC AND DIASTOLIC HEART FAILURE (H): ICD-10-CM

## 2018-09-18 DIAGNOSIS — I50.82 BIVENTRICULAR HEART FAILURE (H): ICD-10-CM

## 2018-09-18 LAB
ANION GAP SERPL CALCULATED.3IONS-SCNC: 6 MMOL/L (ref 3–14)
BUN SERPL-MCNC: 19 MG/DL (ref 7–30)
CALCIUM SERPL-MCNC: 8.5 MG/DL (ref 8.5–10.1)
CHLORIDE SERPL-SCNC: 107 MMOL/L (ref 94–109)
CO2 SERPL-SCNC: 27 MMOL/L (ref 20–32)
CREAT SERPL-MCNC: 1.5 MG/DL (ref 0.66–1.25)
GFR SERPL CREATININE-BSD FRML MDRD: 53 ML/MIN/1.7M2
GLUCOSE SERPL-MCNC: 98 MG/DL (ref 70–99)
POTASSIUM SERPL-SCNC: 3.6 MMOL/L (ref 3.4–5.3)
SODIUM SERPL-SCNC: 140 MMOL/L (ref 133–144)

## 2018-09-18 PROCEDURE — 80048 BASIC METABOLIC PNL TOTAL CA: CPT | Performed by: NURSE PRACTITIONER

## 2018-09-18 PROCEDURE — 99214 OFFICE O/P EST MOD 30 MIN: CPT | Mod: ZP | Performed by: NURSE PRACTITIONER

## 2018-09-18 PROCEDURE — G0463 HOSPITAL OUTPT CLINIC VISIT: HCPCS | Mod: ZF

## 2018-09-18 PROCEDURE — 36415 COLL VENOUS BLD VENIPUNCTURE: CPT | Performed by: NURSE PRACTITIONER

## 2018-09-18 RX ORDER — CARVEDILOL 12.5 MG/1
12.5 TABLET ORAL 2 TIMES DAILY WITH MEALS
Qty: 180 TABLET | Refills: 3 | Status: SHIPPED | OUTPATIENT
Start: 2018-09-18 | End: 2018-12-06 | Stop reason: DRUGHIGH

## 2018-09-18 RX ORDER — FUROSEMIDE 20 MG
TABLET ORAL
Qty: 30 TABLET | Refills: 1 | COMMUNITY
Start: 2018-09-18 | End: 2018-12-06

## 2018-09-18 RX ORDER — LOSARTAN POTASSIUM 50 MG/1
TABLET ORAL
COMMUNITY
Start: 2018-06-21 | End: 2018-12-03

## 2018-09-18 ASSESSMENT — PAIN SCALES - GENERAL: PAINLEVEL: NO PAIN (0)

## 2018-09-18 NOTE — PATIENT INSTRUCTIONS
"You were seen today in the Cardiovascular Clinic at the [facility].     Cardiology Providers you saw during your visit: Yoselin Victoria NP     Follow up and medication changes:  1. Increase Coreg to 12.5mg twice a day (take with meals). Rx sent to pharmacy  2. Avoid Meth/Tobacco  3. Follow up in 3 months with Dr. Freitas    Results for JAVI CASTELLANO (MRN [id number]) as of 2018 17:32   Ref. Range 2018 16:57   Sodium Latest Ref Range: 133 - 144 mmol/L 140   Potassium Latest Ref Range: 3.4 - 5.3 mmol/L 3.6   Chloride Latest Ref Range: 94 - 109 mmol/L 107   Carbon Dioxide Latest Ref Range: 20 - 32 mmol/L 27   Urea Nitrogen Latest Ref Range: 7 - 30 mg/dL 19   Creatinine Latest Ref Range: 0.66 - 1.25 mg/dL 1.50 (H)   GFR Estimate Latest Ref Range: >60 mL/min/1.7m2 53 (L)   GFR Estimate If Black Latest Ref Range: >60 mL/min/1.7m2 64   Calcium Latest Ref Range: 8.5 - 10.1 mg/dL 8.5   Anion Gap Latest Ref Range: 3 - 14 mmol/L 6   Glucose Latest Ref Range: 70 - 99 mg/dL 98     Please limit your fluid intake to 2 L (68 ounces) daily.  2 Liters a day = 8.5 cups, or 68 ounces.  Please limit your salt intake to 2 grams a day or less.     If you gain 2# in 24 hours or 5# in one week call Yolanda Coates RN so we can adjust your medications as needed over the phone.     Please feel free to call me with any questions or concerns.       Yolanda Coates RN  [facility]  Cardiology Care Coordinator-Heart Failure Clinic     Questions and [phone].   First press #1 for the [employer] and then press #3 for \"Medical Questions\" to reach us Cardiology Nurses.      On Call Cardiologist for after hours or on weekends: [phone]   option #4 and ask to speak to the on-call Cardiologist. Inform them you are a CORE/heart failure patient at the [facility].        If you need a medication refill please contact your pharmacy.  Please allow 3 business days for your refill to be " completed.  _______________________________________________________  C.O.R.E. CLINIC Cardiomyopathy, Optimization, Rehabilitation, Education   The C.O.R.E. CLINIC is a heart failure specialty clinic within the [facility] where you will work with specialized nurse practitioners dedicated to helping patients with heart failure carefully adjust medications, receive education, and learn who and when to call if symptoms develop. They specialize in helping you better understand your condition, slow the progression of your disease, improve the length and quality of your life, help you detect future heart problems before they become life threatening, and avoid hospitalizations.  As always, thank you for trusting us with your health care needs!

## 2018-09-18 NOTE — NURSING NOTE
Chief Complaint   Patient presents with     Follow Up For     Return CORE; 37yr old male with a h/o chronic biventricular heart failure presenting for HF follow up with labs prior.     Medications reviewed and vitals performed.   Isabell Deluna CMA

## 2018-09-18 NOTE — PROGRESS NOTES
HPI: 38 yr old male patient returns to CORE clinic for follow up. 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 Most recent echo done 8/24/18 showed slight improvement to 20%.  He unfortunately is still  using meth - statating he is using it 2-3x/week. He is also using tobacco 3-5 cigarettes/day. Pt denies SOB, orthopnea, PND, chest pain, belly bloating, loss of appetite, LE edema. Pt states energy level is good. He did not take his medications today and subsequently his BP is quite elevated.     He has had an ongoing cough. CXR done in August was normal except for cardiomegaly.   This was reassuring to patient and girlfriend.  PAST MEDICAL HISTORY:  Past Medical History:   Diagnosis Date     Hypertension        FAMILY HISTORY:  Family History   Problem Relation Age of Onset     Depression Mother      Anxiety Disorder Mother      Myocardial Infarction Mother      age of onset unknown     Myocardial Infarction Father      age of onset unknown     Cardiac Sudden Death Brother      had sudden cardiac death at 36       SOCIAL HISTORY:  Social History     Social History     Marital status: Single     Spouse name: N/A     Number of children: N/A     Years of education: N/A     Social History Main Topics     Smoking status: Current Some Day Smoker     Packs/day: 0.25     Types: Cigarettes     Smokeless tobacco: Never Used     Alcohol use No      Comment: social events only 1 drink 3-4 times per year     Drug use: 3.00 per week     Special: Methamphetamines      Comment: 1 gram 2-3 times per week     Sexual activity: Not Asked     Other Topics Concern     None     Social History Narrative       CURRENT MEDICATIONS:  Current Outpatient Prescriptions   Medication Sig Dispense Refill     amLODIPine (NORVASC) 2.5 MG tablet Take 1 tablet (2.5 mg) by mouth At Bedtime 90 tablet 3     carvedilol (COREG) 12.5 MG tablet Take 1 tablet (12.5 mg) by mouth 2 times daily (with meals) 180 tablet 3     furosemide  "(LASIX) 20 MG tablet As directed by the CORE clinic 30 tablet 1     hydrALAZINE (APRESOLINE) 100 MG TABS tablet Take 1 tablet (100 mg) by mouth 3 times daily 120 tablet 1     isosorbide mononitrate (IMDUR) 60 MG 24 hr tablet TAKE 1 TABLET BY MOUTH EVERY MORNING 90 tablet 3     lidocaine-EPINEPHrine 1 %-1:141063 SOLN injection 1 mL by Other route       losartan (COZAAR) 100 MG tablet Take 50mg every morning and 100mg every night 90 tablet 3     losartan (COZAAR) 50 MG tablet        benzonatate (TESSALON) 100 MG capsule Take 2 capsules (200 mg) by mouth 3 times daily as needed for cough (Patient not taking: Reported on 9/18/2018) 25 capsule 1     [DISCONTINUED] carvedilol (COREG) 3.125 MG tablet Take1 tablets in the am, 1 tablets in the pm. 60 tablet 1     [DISCONTINUED] carvedilol (COREG) 6.25 MG tablet Take 1 tablet (6.25 mg) by mouth 2 times daily (with meals) 1 po in the morning/ 1 po int he afternoon 180 tablet 3     [DISCONTINUED] furosemide (LASIX) 20 MG tablet Take 1 tablet (20 mg) by mouth daily 30 tablet 1       ROS:   Constitutional: No fever, chills, or sweats. No weight gain/loss.   ENT: No visual disturbance, ear ache, epistaxis, sore throat.   Allergies/Immunologic: Negative.   Respiratory: No cough, hemoptysis.   Cardiovascular: As per HPI.   GI: No nausea, vomiting, hematemesis, melena, or hematochezia.   : No urinary frequency, dysuria, or hematuria.   Integument: Negative.   Psychiatric: Negative.   Neuro: Negative.   Endocrinology: Negative.   Musculoskeletal: Negative.    EXAM:  BP (!) 158/114 (BP Location: Right arm, Patient Position: Chair, Cuff Size: Adult Regular)  Pulse 102  Ht 1.6 m (5' 3\")  Wt 79.4 kg (175 lb)  SpO2 99%  BMI 31 kg/m2  General: appears comfortable, alert and articulate  Head: normocephalic, atraumatic  Eyes: anicteric sclera, EOMI  Neck: no adenopathy  Orophyarynx: moist mucosa, no lesions, dentition intact  Heart: regular, S1/S2, no murmur, gallop, rub, estimated JVP " 8cmm  Lungs: clear, no rales or wheezing  Abdomen: soft, non-tender, bowel sounds present, no hepatosplenomegaly  Extremities: no clubbing, cyanosis or edema  Neurological: normal speech and affect, no gross motor deficits    Labs:  CBC RESULTS:  Lab Results   Component Value Date    WBC 11.6 (H) 04/15/2018    RBC 5.39 04/15/2018    HGB 15.4 04/15/2018    HCT 46.8 04/15/2018    MCV 87 04/15/2018    MCH 28.6 04/15/2018    MCHC 32.9 04/15/2018    RDW 15.6 (H) 04/15/2018     04/15/2018       CMP RESULTS:  Lab Results   Component Value Date     09/18/2018    POTASSIUM 3.6 09/18/2018    CHLORIDE 107 09/18/2018    CO2 27 09/18/2018    ANIONGAP 6 09/18/2018    GLC 98 09/18/2018    BUN 19 09/18/2018    CR 1.50 (H) 09/18/2018    GFRESTIMATED 53 (L) 09/18/2018    GFRESTBLACK 64 09/18/2018    SAMARA 8.5 09/18/2018    BILITOTAL 1.3 04/05/2018    ALBUMIN 3.1 (L) 04/05/2018    ALKPHOS 76 04/05/2018    ALT 58 04/05/2018    AST 57 (H) 04/05/2018        INR RESULTS:  No results found for: INR    Lab Results   Component Value Date    MAG 1.9 04/15/2018     No results found for: NTBNPI  Lab Results   Component Value Date    NTBNP 3640 (H) 05/08/2018       Assessment and Plan:   1. 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    Stage C  NYHA Class II  ACEi/ARB yes  BB titration ongoing - coreg dose increased to 12.5mg BID.  Aldosterone antagonist deferred while other medical therapy is prioritized  SCD prophylaxis decision deferred during medication uptitration  % BiV pacing: N/A  Fluid status : euvolemic    2. Meth use: discussed treatment again - pt encouraged to attend treatment.    3. Ongoing tobacco use: discussed cessation - however, he is not interested at this time.  his meth use is the bigger concern .     4. CKD: suspect due to low output. Stable renal function    5. Cough: chronic - normal CXR, and no evidence of volume overload. Suspect due to environmental factors (sanding  cars)/    F/U in 3 months with Dr. Tyson.              CC  Patient Care Team:  No Ref-Primary, Physician as PCP - General  Corina Cuellar, RN as Nurse Coordinator (Cardiology)  Enedina Victoria, GARRETT CNP as Nurse Practitioner (Cardiology)  Cuca Flores, RN as Nurse Coordinator (Cardiology)  Jose Daniel Ewing, RN as Nurse Coordinator (Cardiology)  Yolanda Coates, DEBORAH as Nurse Coordinator (Cardiology)  ENEDINA VICTORIA

## 2018-09-18 NOTE — MR AVS SNAPSHOT
"              After Visit Summary   2018    Palomo Patterson    MRN: [id number]           Patient Information     Date Of Birth          1980        Visit Information        Provider Department      2018 5:00 PM Yoselin Victoria APRN [facility]        Today's Diagnoses     Acute on chronic combined systolic and diastolic heart failure (H)          Care Instructions    You were seen today in the Cardiovascular Clinic at the [facility].     Cardiology Providers you saw during your visit: Yoselin Victoria NP     Follow up and medication changes:  1. Increase Coreg to 12.5mg twice a day (take with meals). Rx sent to pharmacy  2. Avoid Meth/Tobacco  3. Follow up in 3 months with Dr. Freitas        Please limit your fluid intake to 2 L (68 ounces) daily.  2 Liters a day = 8.5 cups, or 68 ounces.  Please limit your salt intake to 2 grams a day or less.     If you gain 2# in 24 hours or 5# in one week call Yolanda Coates RN so we can adjust your medications as needed over the phone.     Please feel free to call me with any questions or concerns.       Yolanda Coates RN  [facility]  Cardiology Care Coordinator-Heart Failure Clinic     Questions and [phone].   First press #1 for the [employer] and then press #3 for \"Medical Questions\" to reach us Cardiology Nurses.      On Call Cardiologist for after hours or on weekends: [phone]   option #4 and ask to speak to the on-call Cardiologist. Inform them you are a CORE/heart failure patient at the [facility].        If you need a medication refill please contact your pharmacy.  Please allow 3 business days for your refill to be completed.  _______________________________________________________  C.O.R.E. CLINIC Cardiomyopathy, Optimization, Rehabilitation, Education   The C.O.R.E. CLINIC is a heart failure specialty clinic within the [facility] Clinic where you will work with " specialized nurse practitioners dedicated to helping patients with heart failure carefully adjust medications, receive education, and learn who and when to call if symptoms develop. They specialize in helping you better understand your condition, slow the progression of your disease, improve the length and quality of your life, help you detect future heart problems before they become life threatening, and avoid hospitalizations.  As always, thank you for trusting us with your health care needs!          Follow-ups after your visit        Additional Services     Follow-Up with Advanced Heart Failure Cardiologist                 Your next 10 appointments already scheduled     Dec 06, 2018  3:30 PM CST   (Arrive by 3:15 PM)   RETURN HEART FAILURE with Ethan Freitas MD   [facility] ([facility])    [address]  [address] [place] [id number] [phone]              Future tests that were ordered for you today     Open Future Orders        Priority Expected Expires Ordered    Follow-Up with Advanced Heart Failure Cardiologist Routine 12/3/2018 12/24/2018 9/18/2018            Who to contact     If you have questions or need follow up information about today's clinic visit or your schedule please contact [facility] directly at [phone].  Normal or non-critical lab and imaging results will be communicated to you by MyChart, letter or phone within 4 business days after the clinic has received the results. If you do not hear from us within 7 days, please contact the clinic through MyChart or phone. If you have a critical or abnormal lab result, we will notify you by phone as soon as possible.  Submit refill requests through [employer] or call your pharmacy and they will forward the refill request to us. Please allow 3 business days for your refill to be completed.          Additional Information About Your Visit        Care EveryWhere ID     This is your  "Care EveryWhere ID. This could be used by other organizations to access your [facility] medical records  [id number]        Your Vitals Were     Pulse Height Pulse Oximetry BMI (Body Mass Index)          102 1.6 m (5' 3\") 99% 31 kg/m2         Blood Pressure from Last 3 Encounters:   09/18/18 (!) 158/114   08/14/18 122/81   07/11/18 120/77    Weight from Last 3 Encounters:   09/18/18 79.4 kg (175 lb)   08/14/18 78.5 kg (173 lb)   07/11/18 79.4 kg (175 lb 1.6 oz)              We Performed the Following     Follow-Up with [facility]          Today's Medication Changes          These changes are accurate as of 9/18/18  5:31 PM.  If you have any questions, ask your nurse or doctor.               These medicines have changed or have updated prescriptions.        Dose/Directions    carvedilol 12.5 MG tablet   Commonly known as:  COREG   This may have changed:    - medication strength  - how much to take  - additional instructions  - Another medication with the same name was removed. Continue taking this medication, and follow the directions you see here.   Used for:  Acute on chronic combined systolic and diastolic heart failure (H)   Changed by:  Yoselin Victoria APRN CNP        Dose:  12.5 mg   Take 1 tablet (12.5 mg) by mouth 2 times daily (with meals)   Quantity:  180 tablet   Refills:  3       furosemide 20 MG tablet   Commonly known as:  LASIX   This may have changed:    - how much to take  - how to take this  - when to take this  - additional instructions   Used for:  Acute on chronic combined systolic and diastolic heart failure (H)   Changed by:  Yoselin Victoria APRN CNP        As directed by the [facility] clinic   Quantity:  30 tablet   Refills:  1            Where to get your medicines      These medications were sent to [facility]/pharmacy #[id number] [place], [place] - [address] [place] [id number]     Phone:  [phone]     carvedilol 12.5 MG tablet                Primary Care Provider Fax " #    Physician No Ref-Primary [phone]       No address on file        Equal Access to Services     RAMIREZ BETHANIE : Hadii aad ku hadmangomattie Fernando, waandrewda lucesiliaglendaha, qazita kamron juanamarlin, waxpavithra sophiein hayaarigo ariasmariella li shahzad . So [facility] [phone].    ATENCIÓN: Si habla español, tiene a villavicencio disposición servicios gratuitos de asistencia lingüística. Llame al [phone].    We comply with applicable federal civil rights laws and [place] laws. We do not discriminate on the basis of race, color, national origin, age, disability, sex, sexual orientation, or gender identity.            Thank you!     Thank you for choosing [facility]  for your care. Our goal is always to provide you with excellent care. Hearing back from our patients is one way we can continue to improve our services. Please take a few minutes to complete the written survey that you may receive in the mail after your visit with us. Thank you!             Your Updated Medication List - Protect others around you: Learn how to safely use, store and throw away your medicines at www.disposemymeds.org.          This list is accurate as of 9/18/18  5:31 PM.  Always use your most recent med list.                   Brand Name Dispense Instructions for use Diagnosis    amLODIPine 2.5 MG tablet    NORVASC    90 tablet    Take 1 tablet (2.5 mg) by mouth At Bedtime    Acute on chronic combined systolic and diastolic heart failure (H), Acute on chronic combined systolic and diastolic congestive heart failure (H), Biventricular heart failure, Benign essential hypertension       benzonatate 100 MG capsule    TESSALON    25 capsule    Take 2 capsules (200 mg) by mouth 3 times daily as needed for cough    Acute on chronic combined systolic and diastolic congestive heart failure (H)       carvedilol 12.5 MG tablet    COREG    180 tablet    Take 1 tablet (12.5 mg) by mouth 2 times daily (with meals)    Acute on chronic combined systolic and diastolic  heart failure (H)       furosemide 20 MG tablet    LASIX    30 tablet    As directed by the CORE clinic    Acute on chronic combined systolic and diastolic heart failure (H)       hydrALAZINE 100 MG Tabs tablet    APRESOLINE    120 tablet    Take 1 tablet (100 mg) by mouth 3 times daily    Acute on chronic combined systolic and diastolic heart failure (H), Biventricular heart failure       isosorbide mononitrate 60 MG 24 hr tablet    IMDUR    90 tablet    TAKE 1 TABLET BY MOUTH EVERY MORNING    Acute on chronic combined systolic and diastolic congestive heart failure (H), Acute on chronic combined systolic and diastolic heart failure (H), Benign essential hypertension       lidocaine-EPINEPHrine 1 %-1:083134 Soln injection      1 mL by Other route        * losartan 50 MG tablet    COZAAR          * losartan 100 MG tablet    COZAAR    90 tablet    Take 50mg every morning and 100mg every night    Acute on chronic combined systolic and diastolic congestive heart failure (H)       * Notice:  This list has 2 medication(s) that are the same as other medications prescribed for you. Read the directions carefully, and ask your doctor or other care provider to review them with you.

## 2018-11-24 DIAGNOSIS — I50.43 ACUTE ON CHRONIC COMBINED SYSTOLIC AND DIASTOLIC CONGESTIVE HEART FAILURE (H): ICD-10-CM

## 2018-11-26 NOTE — TELEPHONE ENCOUNTER
Routing to cardiology to please review.     losartan (COZAAR) 50 MG tablet   6/21/2018  --   Class: Historical     losartan (COZAAR) 100 MG tablet 90 tablet 3 7/11/2018  No   Sig: Take 50mg every morning and 100mg every night       Angiotensin-II Receptors Pakxhv81/24 12:00 PM   Blood pressure under 140/90 in past 12 months    Normal serum creatinine on file in past 12 months    Recent (12 mo) or future (30 days) visit within the authorizing provider's specialty    Patient is age 18 or older    Normal serum potassium on file in past 12 months     Sydnee Mcrae RN  \

## 2018-11-27 RX ORDER — LOSARTAN POTASSIUM 50 MG/1
TABLET ORAL
Qty: 90 TABLET | Refills: 0 | Status: SHIPPED | OUTPATIENT
Start: 2018-11-27 | End: 2018-12-03

## 2018-11-30 ENCOUNTER — TELEPHONE (OUTPATIENT)
Dept: CARDIOLOGY | Facility: CLINIC | Age: 38
End: 2018-11-30

## 2018-11-30 DIAGNOSIS — I50.43 ACUTE ON CHRONIC COMBINED SYSTOLIC AND DIASTOLIC CONGESTIVE HEART FAILURE (H): ICD-10-CM

## 2018-11-30 NOTE — TELEPHONE ENCOUNTER
[facility] Call [facility]    Phone Message    May a detailed message be left on voicemail: yes    Reason for Call: Medication Question or concern regarding medication   Prescription Clarification  Name of Medication: losartan (COZAAR) 50 MG tablet  Prescribing Provider: Yoselin Victoria   Pharmacy: [facility]    What on the order needs clarification? Pharmacy calling the insurance will not cover 3 tabs of the Rx. losartan (COZAAR) 50 MG tablet. The Pharmacist Apryl is wanting to know if Yoselin Victoria would split the prescriptions 1 50mg tablet for the morning then 1 100mg tablet to take at night.  If ok please fax over new script or Call Apryl at [phone].          Action Taken: Message routed to:  [facility] ([facility]): dmitriy cardio

## 2018-12-03 DIAGNOSIS — I50.43 ACUTE ON CHRONIC COMBINED SYSTOLIC AND DIASTOLIC CONGESTIVE HEART FAILURE (H): ICD-10-CM

## 2018-12-03 RX ORDER — LOSARTAN POTASSIUM 50 MG/1
50 TABLET ORAL DAILY
Qty: 90 TABLET | Refills: 1 | Status: SHIPPED | OUTPATIENT
Start: 2018-12-03 | End: 2018-12-06

## 2018-12-03 RX ORDER — LOSARTAN POTASSIUM 100 MG/1
TABLET ORAL
Qty: 90 TABLET | Refills: 3 | Status: SHIPPED | OUTPATIENT
Start: 2018-12-03 | End: 2018-12-06

## 2018-12-06 ENCOUNTER — RECORDS - HEALTHEAST (OUTPATIENT)
Dept: ADMINISTRATIVE | Facility: OTHER | Age: 38
End: 2018-12-06

## 2018-12-06 ENCOUNTER — OFFICE VISIT (OUTPATIENT)
Dept: CARDIOLOGY | Facility: CLINIC | Age: 38
End: 2018-12-06
Attending: INTERNAL MEDICINE
Payer: COMMERCIAL

## 2018-12-06 VITALS
WEIGHT: 187.6 LBS | SYSTOLIC BLOOD PRESSURE: 141 MMHG | HEART RATE: 105 BPM | OXYGEN SATURATION: 97 % | BODY MASS INDEX: 33.24 KG/M2 | HEIGHT: 63 IN | DIASTOLIC BLOOD PRESSURE: 88 MMHG

## 2018-12-06 DIAGNOSIS — I50.43 ACUTE ON CHRONIC COMBINED SYSTOLIC AND DIASTOLIC HEART FAILURE (H): ICD-10-CM

## 2018-12-06 DIAGNOSIS — I50.43 ACUTE ON CHRONIC COMBINED SYSTOLIC AND DIASTOLIC CONGESTIVE HEART FAILURE (H): ICD-10-CM

## 2018-12-06 DIAGNOSIS — I50.82 BIVENTRICULAR HEART FAILURE (H): ICD-10-CM

## 2018-12-06 DIAGNOSIS — I10 BENIGN ESSENTIAL HYPERTENSION: ICD-10-CM

## 2018-12-06 PROCEDURE — G0463 HOSPITAL OUTPT CLINIC VISIT: HCPCS | Mod: ZF

## 2018-12-06 PROCEDURE — 99215 OFFICE O/P EST HI 40 MIN: CPT | Mod: ZP | Performed by: INTERNAL MEDICINE

## 2018-12-06 RX ORDER — LOSARTAN POTASSIUM 50 MG/1
50 TABLET ORAL DAILY
Qty: 90 TABLET | Refills: 1 | Status: SHIPPED | OUTPATIENT
Start: 2018-12-06 | End: 2023-01-27 | Stop reason: ALTCHOICE

## 2018-12-06 RX ORDER — HYDRALAZINE HYDROCHLORIDE 100 MG/1
100 TABLET, FILM COATED ORAL 3 TIMES DAILY
Qty: 120 TABLET | Refills: 1 | Status: SHIPPED | OUTPATIENT
Start: 2018-12-06 | End: 2019-03-24

## 2018-12-06 RX ORDER — LOSARTAN POTASSIUM 100 MG/1
TABLET ORAL
Qty: 90 TABLET | Refills: 3 | Status: SHIPPED | OUTPATIENT
Start: 2018-12-06 | End: 2020-01-11

## 2018-12-06 RX ORDER — CARVEDILOL 6.25 MG/1
6.25 TABLET ORAL 2 TIMES DAILY WITH MEALS
Qty: 60 TABLET | Refills: 3 | Status: SHIPPED | OUTPATIENT
Start: 2018-12-06 | End: 2018-12-06

## 2018-12-06 RX ORDER — CARVEDILOL 6.25 MG/1
6.25 TABLET ORAL 2 TIMES DAILY WITH MEALS
Qty: 60 TABLET | Refills: 3 | Status: SHIPPED | OUTPATIENT
Start: 2018-12-06 | End: 2023-01-25

## 2018-12-06 RX ORDER — FUROSEMIDE 20 MG
TABLET ORAL
Qty: 30 TABLET | Refills: 1 | Status: SHIPPED | OUTPATIENT
Start: 2018-12-06 | End: 2023-01-25

## 2018-12-06 RX ORDER — CARVEDILOL 12.5 MG/1
12.5 TABLET ORAL 2 TIMES DAILY WITH MEALS
Qty: 60 TABLET | Refills: 3 | Status: SHIPPED | OUTPATIENT
Start: 2018-12-06 | End: 2019-08-15

## 2018-12-06 RX ORDER — ISOSORBIDE MONONITRATE 60 MG/1
60 TABLET, EXTENDED RELEASE ORAL EVERY MORNING
Qty: 90 TABLET | Refills: 3 | Status: SHIPPED | OUTPATIENT
Start: 2018-12-06 | End: 2020-01-11

## 2018-12-06 RX ORDER — CARVEDILOL 12.5 MG/1
12.5 TABLET ORAL 2 TIMES DAILY WITH MEALS
Qty: 60 TABLET | Refills: 3 | Status: SHIPPED | OUTPATIENT
Start: 2018-12-06 | End: 2018-12-06

## 2018-12-06 RX ORDER — AMLODIPINE BESYLATE 2.5 MG/1
2.5 TABLET ORAL AT BEDTIME
Qty: 90 TABLET | Refills: 3 | Status: SHIPPED | OUTPATIENT
Start: 2018-12-06 | End: 2020-02-10

## 2018-12-06 ASSESSMENT — PAIN SCALES - GENERAL: PAINLEVEL: NO PAIN (0)

## 2018-12-06 NOTE — MR AVS SNAPSHOT
After Visit Summary   12/6/2018    Palomo Patterson    MRN: [id number]           Patient Information     Date Of Birth          1980        Visit Information        Provider Department      12/6/2018 3:30 PM Ethan Freitas MD [facility]        Today's Diagnoses     Acute on chronic combined systolic and diastolic heart failure (H)        Acute on chronic combined systolic and diastolic congestive heart failure (H)        Benign essential hypertension        Biventricular heart failure (H)          Care Instructions    Thank you for your visit today.  Please call me with any questions or concerns.   Jose Daniel Ewing RN  Cardiology Care Coordinator  [phone], press option 1 then option 3    Increase carvedilol to 18.75 mgs twice daily  See Dr. Parker regarding chemical dependency treatment  Return to our clinic first week of January for further medication increases and laboratories including kidney function, minerals and heart failure hormone  Please think about having the pacemaker/defibrillator installed.          Follow-ups after your visit        Additional Services     Follow-Up with Cardiologist       Please schedule labs and a CORE clinic visit same day in one month.                  Your next 10 appointments already scheduled     Erasmo 15, 2019  6:00 PM CST   Lab with  LAB   [facility] ([facility])    [address]  [facility] [address] [phone]            Erasmo 15, 2019  6:30 PM CST   (Arrive by 6:15 PM)   CORE RETURN with GARRETT [facility] ([facility])    [address] [place] [address]  [address] [place] [address] [phone]              Future tests that were ordered for you today     Open Future Orders        Priority Expected Expires Ordered    Magnesium Routine 1/6/2019 12/6/2019 12/6/2018    Basic metabolic panel Routine 1/5/2019 12/6/2019 12/6/2018    N  "terminal pro BNP outpatient Routine 2019    Follow-Up with Cardiologist Routine 2019            Who to contact     If you have questions or need follow up information about today's clinic visit or your schedule please contact [facility] directly at [phone].  Normal or non-critical lab and imaging results will be communicated to you by MyChart, letter or phone within 4 business days after the clinic has received the results. If you do not hear from us within 7 days, please contact the clinic through [employer] or phone. If you have a critical or abnormal lab result, we will notify you by phone as soon as possible.  Submit refill requests through [employer] or call your pharmacy and they will forward the refill request to us. Please allow 3 business days for your refill to be completed.          Additional Information About Your Visit        [employer] Information     [employer] lets you send messages to your doctor, view your test results, renew your prescriptions, schedule appointments and more. To sign up, go to [email or web address] . Click on \"Log in\" on the left side of the screen, which will take you to the Welcome page. Then click on \"Sign up Now\" on the right side of the page.     You will be asked to enter the access code listed below, as well as some personal information. Please follow the directions to create your username and password.     Your access code is: [email or web address]  Expires: 3/6/2019  4:35 PM     Your access code will  in 90 days. If you need help or a new code, please call your [facility] clinic or [phone].        Care EveryWhere ID     This is your Care EveryWhere ID. This could be used by other organizations to access your [facility] medical records  [id number]        Your Vitals Were     Pulse Height Pulse Oximetry BMI (Body Mass Index)          105 1.6 m (5' 3\") 97% 33.23 kg/m2         Blood Pressure from Last 3 Encounters: "   12/06/18 141/88   09/18/18 (!) 158/114   08/14/18 122/81    Weight from Last 3 Encounters:   12/06/18 85.1 kg (187 lb 9.6 oz)   09/18/18 79.4 kg (175 lb)   08/14/18 78.5 kg (173 lb)              We Performed the Following     Follow-Up with Advanced Heart Failure Cardiologist          Today's Medication Changes          These changes are accurate as of 12/6/18  4:35 PM.  If you have any questions, ask your nurse or doctor.               These medicines have changed or have updated prescriptions.        Dose/Directions    * carvedilol 6.25 MG tablet   Commonly known as:  COREG   This may have changed:    - medication strength  - how much to take   Used for:  Acute on chronic combined systolic and diastolic heart failure (H)   Changed by:  Ethan Freitas MD        Dose:  6.25 mg   Take 1 tablet (6.25 mg) by mouth 2 times daily (with meals)   Quantity:  60 tablet   Refills:  3       * carvedilol 12.5 MG tablet   Commonly known as:  COREG   This may have changed:  You were already taking a medication with the same name, and this prescription was added. Make sure you understand how and when to take each.   Used for:  Acute on chronic combined systolic and diastolic heart failure (H)   Changed by:  Ethan Freitas MD        Dose:  12.5 mg   Take 1 tablet (12.5 mg) by mouth 2 times daily (with meals)   Quantity:  60 tablet   Refills:  3       isosorbide mononitrate 60 MG 24 hr tablet   Commonly known as:  IMDUR   This may have changed:  See the new instructions.   Used for:  Acute on chronic combined systolic and diastolic congestive heart failure (H), Acute on chronic combined systolic and diastolic heart failure (H), Benign essential hypertension   Changed by:  Ethan Freitas MD        Dose:  60 mg   Take 1 tablet (60 mg) by mouth every morning   Quantity:  90 tablet   Refills:  3       * Notice:  This list has 2 medication(s) that are the same as other medications prescribed for you. Read the  directions carefully, and ask your doctor or other care provider to review them with you.         Where to get your medicines      These medications were sent to [facility]/pharmacy #[id number] - [place], [place] - [address] [place] [address]  [address] [facility] [address]     Phone:  [phone]     amLODIPine 2.5 MG tablet    carvedilol 12.5 MG tablet    carvedilol 6.25 MG tablet    furosemide 20 MG tablet    hydrALAZINE 100 MG tablet    isosorbide mononitrate 60 MG 24 hr tablet    losartan 100 MG tablet    losartan 50 MG tablet                Primary Care Provider Fax #    Physician No Ref-Primary [phone]       No address on file        Equal Access to Services     [facility] : Hadii adriel King, wawilbur harry, qaybta kaalmada wilfred, blue felton. [facility] [phone].    ATENCIÓN: Si habla español, tiene a villavicencio disposición servicios gratuitos de asistencia lingüística. [facility] [phone].    We comply with applicable federal civil rights laws and [place] laws. We do not discriminate on the basis of race, color, national origin, age, disability, sex, sexual orientation, or gender identity.            Thank you!     Thank you for choosing [facility]  for your care. Our goal is always to provide you with excellent care. Hearing back from our patients is one way we can continue to improve our services. Please take a few minutes to complete the written survey that you may receive in the mail after your visit with us. Thank you!             Your Updated Medication List - Protect others around you: Learn how to safely use, store and throw away your medicines at www.disposemymeds.org.          This list is accurate as of 12/6/18  4:35 PM.  Always use your most recent med list.                   Brand Name Dispense Instructions for use Diagnosis    amLODIPine 2.5 MG tablet    NORVASC    90 tablet    Take 1 tablet (2.5 mg) by mouth At Bedtime    Acute on chronic  combined systolic and diastolic heart failure (H), Acute on chronic combined systolic and diastolic congestive heart failure (H), Biventricular heart failure (H), Benign essential hypertension       benzonatate 100 MG capsule    TESSALON    25 capsule    Take 2 capsules (200 mg) by mouth 3 times daily as needed for cough    Acute on chronic combined systolic and diastolic congestive heart failure (H)       * carvedilol 6.25 MG tablet    COREG    60 tablet    Take 1 tablet (6.25 mg) by mouth 2 times daily (with meals)    Acute on chronic combined systolic and diastolic heart failure (H)       * carvedilol 12.5 MG tablet    COREG    60 tablet    Take 1 tablet (12.5 mg) by mouth 2 times daily (with meals)    Acute on chronic combined systolic and diastolic heart failure (H)       furosemide 20 MG tablet    LASIX    30 tablet    As directed by the CORE clinic    Acute on chronic combined systolic and diastolic heart failure (H)       hydrALAZINE 100 MG tablet    APRESOLINE    120 tablet    Take 1 tablet (100 mg) by mouth 3 times daily    Acute on chronic combined systolic and diastolic heart failure (H), Biventricular heart failure (H)       isosorbide mononitrate 60 MG 24 hr tablet    IMDUR    90 tablet    Take 1 tablet (60 mg) by mouth every morning    Acute on chronic combined systolic and diastolic congestive heart failure (H), Acute on chronic combined systolic and diastolic heart failure (H), Benign essential hypertension       lidocaine-EPINEPHrine 1 %-1:547397 Soln injection      1 mL by Other route        * losartan 50 MG tablet    COZAAR    90 tablet    Take 1 tablet (50 mg) by mouth daily In the morning    Acute on chronic combined systolic and diastolic congestive heart failure (H)       * losartan 100 MG tablet    COZAAR    90 tablet    Take 100mg every night    Acute on chronic combined systolic and diastolic congestive heart failure (H)       * Notice:  This list has 4 medication(s) that are the same as  other medications prescribed for you. Read the directions carefully, and ask your doctor or other care provider to review them with you.

## 2018-12-06 NOTE — LETTER
2018      RE: Palomo Patterson  [address]  [place] [address]       Dear Colleague,    Thank you for the opportunity to participate in the care of your patient, Palomo Patterson, at the [facility] HEART [facility]. Please see a copy of my visit note below.    Service Date: 2018     José Antonio Murillo MD   [address] [address]   FAX:  [phone]       Demetrio Parker MD    [address] [place] [id number] [phone] (Fax)       RE: Palomo Patterson   MRN: [id number]   : 1980       IMPRESSION:   1.  Biventricular acute on chronic congestive heart failure.   2.  History of methamphetamine use (recurrent).   A.  History of multiple positive urine tests.   3.  History of smoking.   4.  Family history of atherosclerotic cardiovascular disease.   5.  Family history of sudden death.   6.  History of appendiceal rupture.      Dear José Antonio:      I had the opportunity to see your patient Palomo Patterson for advanced heart failure therapy evaluation.  As you are aware, he is a 39-year-old [place]  male who presented with a 6 month history of cough and was subsequently found to have cardiomegaly and then reduced ejection fraction.  He has a family history of sudden death as well as cardiomyopathy in his mother.  He has a history of cigarette smoking.  He also has a history of recurrent methamphetamine usage.  He has been using within the last week.  He has been through treat on more than one occasion.     He has no hematuria.  No interim history of palpitations, presyncope or syncope.  He has some paroxysmal cough, particularly at night.  He does have dependent edema.  He has no history of thyroid dysfunction.  He does not use alcohol regularly.  He has no other history of toxic exposures.  He has no history of autoimmune disease.      SMOKING:  One pack per day.        RECREATIONAL DRUGS:  Methamphetamine.       FAMILY HISTORY:  See above.      CURRENT MEDICATIONS:   Current Outpatient Prescriptions   Medication     amLODIPine (NORVASC) 2.5 MG tablet     carvedilol (COREG) 12.5 MG tablet     furosemide (LASIX) 20 MG tablet     hydrALAZINE (APRESOLINE) 100 MG TABS tablet     isosorbide mononitrate (IMDUR) 60 MG 24 hr tablet     losartan (COZAAR) 100 MG tablet     losartan (COZAAR) 50 MG tablet     benzonatate (TESSALON) 100 MG capsule     lidocaine-EPINEPHrine 1 %-1:655683 SOLN injection     No current facility-administered medications for this visit.    .    [facility]  Diagnostic and Treamtent-3rd Floor  [address] [place] [address]     Name: JAVI CASTELLANO  MRN: [id number]  : 1980  Study Date: 2018 03:10 PM  Age: 38 yrs  Gender: Male  [place] [facility] For Study: Chronic systolic congestive heart failure (H)  Ordering Physician: ENEDINA CHIRINOS  Referring Physician: ENEDINA CHIRINOS  Performed By: Oscar Rothman RDCS     BSA: 1.8 m2  Height: 63 in  Weight: 173 lb  _____________________________________________________________________________  __        Procedure  Echocardiogram with two-dimensional, color and spectral Doppler performed.  _____________________________________________________________________________  __        Interpretation Summary  Severe left ventricular dilation is present. Severely (EF 20%) reduced left  ventricular function is present. Severe diffuse hypokinesis is present.  Global right ventricular function is mildly reduced.  Mild mitral insufficiency is present.  The inferior vena cava was normal in size with preserved respiratory  variability.  No pericardial effusion is present.  _____________________________________________________________________________  __        Left Ventricle  Severe left ventricular dilation is present. Severely (EF 20%) reduced left  ventricular function is present. Grade II or moderate  diastolic dysfunction.  Severe diffuse hypokinesis is present. There is no thrombus.     Right Ventricle  The right ventricle is normal size. Global right ventricular function is  mildly reduced.     Atria  Both atria appear normal.        Mitral Valve  Mild mitral insufficiency is present.     Aortic Valve  Aortic valve is normal in structure and function.     Tricuspid Valve  Trace tricuspid insufficiency is present. The peak velocity of the tricuspid  regurgitant jet is not obtainable. Pulmonary artery systolic pressure cannot  be assessed.     Pulmonic Valve  Trace pulmonic insufficiency is present.     Vessels  The aorta root is normal. The inferior vena cava was normal in size with  preserved respiratory variability.     Pericardium  No pericardial effusion is present.        Compared to Previous Study  This study was compared with the study from 18, LV function has slightly  improved .  _____________________________________________________________________________  __     MMode/2D Measurements & Calculations  IVSd: 0.85 cm  LVIDd: 5.8 cm  LVIDs: 5.3 cm  LVPWd: 0.93 cm  FS: 9.9 %  LV mass(C)d: 203.6 grams  LV mass(C)dI: 112.0 grams/m2  Ao root diam: 3.3 cm  asc Aorta Diam: 3.2 cm  LVOT diam: 2.4 cm  LVOT area: 4.4 cm2  LA Volume (BP): 55.8 ml  LA Volume Index (BP): 30.7 ml/m2     RWT: 0.32        Doppler Measurements & Calculations  MV E max tad: 82.9 cm/sec  MV A max tad: 91.7 cm/sec  MV E/A: 0.90  MV dec slope: 871.8 cm/sec2  MV dec time: 0.10 sec  PA acc time: 0.09 sec  E/E' av.7  Lateral E/e': 17.0  Medial E/e': 20.4     _____________________________________________________________________________  __        PHYSICAL EXAMINATION:  Examination shows an alert, oriented [place]  male.  Blood pressure is 124/80, heart rate 78 and slightly irregular secondary to frequent prematures.  Jugular venous pressure is increased.  Hepatojugular reflex is positive.  Chest shows rhonchi that clear with  cough.  There are no wheezes.  There is no evidence of left or right ventricular overactivity.  There is an apical 4th heart sound.  No murmurs are noted.  The abdomen shows an old surgical incision.  There is no obvious ascites.  There is 2+ peripheral edema.  He has normal gait, station and mentation.  He is multiply tattooed.       Results for JAVI CASTELLANO (MRN [id number]) as of 12/6/2018 16:06   Ref. Range 8/14/2018 18:38 8/28/2018 14:45 8/28/2018 15:53 9/18/2018 16:57   Sodium Latest Ref Range: 133 - 144 mmol/L 142   140   Potassium Latest Ref Range: 3.4 - 5.3 mmol/L 3.8   3.6   Chloride Latest Ref Range: 94 - 109 mmol/L 108   107   Carbon Dioxide Latest Ref Range: 20 - 32 mmol/L 27   27   Urea Nitrogen Latest Ref Range: 7 - 30 mg/dL 18   19   Creatinine Latest Ref Range: 0.66 - 1.25 mg/dL 1.72 (H)   1.50 (H)   GFR Estimate Latest Ref Range: >60 mL/min/1.7m2 45 (L)   53 (L)   GFR Estimate If Black Latest Ref Range: >60 mL/min/1.7m2 54 (L)   64   Calcium Latest Ref Range: 8.5 - 10.1 mg/dL 8.5   8.5   Anion Gap Latest Ref Range: 3 - 14 mmol/L 7   6   Glucose Latest Ref Range: 70 - 99 mg/dL 100 (H)   98   XR CHEST 2 VW Unknown  Rpt     ECHO COMPLETE Unknown   Rpt      The patient is not a candidate at this time for advanced therapies in view of his chemical dependency.  He would need to go through treatment as well as be clean, including random urine checking, for at least 6 months to a year following treatment.    He clinically stable at the point from a cardiovascular point of view. Unfortunately, he is still using methamphetamine 3x/week.  No immediate members of his family are aware of current use, however, his girlfriend is aware.  He does not have a defibrillator and we discussed again the risk and benefit of insertion.  He wishes to think about this!  We also talked about repeat chemical dependency treatment and he will think about it.  I quoted him a 20+% per annum mortality.     His blood pressure is in  the 130 range and heart rate in the 90 range and therefore am increasing carvedilol to 18.75 PO BID and asking his to return after Antonio for further assessment and drug titration and conversion from ACE to Entresto.      D: 2018   T: 2018   MT: destiny   Name:     JAVI CASTELLANO   MRN:      [id number]        Account:      [id number]   :      1980           Service Date: 2018   Document: [id number]      Ethan Freitas MD

## 2018-12-06 NOTE — PATIENT INSTRUCTIONS
Thank you for your visit today.  Please call me with any questions or concerns.   Jose Daniel Ewing RN  Cardiology Care Coordinator  [phone], press option 1 then option 3    Increase carvedilol to 18.75 mgs twice daily  See Dr. Parker regarding chemical dependency treatment  Return to our clinic first week of January for further medication increases and laboratories including kidney function, minerals and heart failure hormone  Please think about having the pacemaker/defibrillator installed.

## 2018-12-06 NOTE — PROGRESS NOTES
Service Date: 2018     José Antonio Murillo MD   [address] [address]   FAX:  [phone]       Demetrio Parker MD    [address] [place] [id number] [phone] (Fax)       RE: Palomo Patterson   MRN: [id number]   : 1980       IMPRESSION:   1.  Biventricular acute on chronic congestive heart failure.   2.  History of methamphetamine use (recurrent).   A.  History of multiple positive urine tests.   3.  History of smoking.   4.  Family history of atherosclerotic cardiovascular disease.   5.  Family history of sudden death.   6.  History of appendiceal rupture.      Dear José Antonio:      I had the opportunity to see your patient Palomo Patterson for advanced heart failure therapy evaluation.  As you are aware, he is a 39-year-old [place]  male who presented with a 6 month history of cough and was subsequently found to have cardiomegaly and then reduced ejection fraction.  He has a family history of sudden death as well as cardiomyopathy in his mother.  He has a history of cigarette smoking.  He also has a history of recurrent methamphetamine usage.  He has been using within the last week.  He has been through treat on more than one occasion.     He has no hematuria.  No interim history of palpitations, presyncope or syncope.  He has some paroxysmal cough, particularly at night.  He does have dependent edema.  He has no history of thyroid dysfunction.  He does not use alcohol regularly.  He has no other history of toxic exposures.  He has no history of autoimmune disease.      SMOKING:  One pack per day.        RECREATIONAL DRUGS:  Methamphetamine.      FAMILY HISTORY:  See above.      CURRENT MEDICATIONS:   Current Outpatient Prescriptions   Medication     amLODIPine (NORVASC) 2.5 MG tablet     carvedilol (COREG) 12.5 MG tablet     furosemide (LASIX) 20 MG tablet     hydrALAZINE (APRESOLINE) 100 MG TABS tablet     isosorbide mononitrate (IMDUR) 60 MG  24 hr tablet     losartan (COZAAR) 100 MG tablet     losartan (COZAAR) 50 MG tablet     benzonatate (TESSALON) 100 MG capsule     lidocaine-EPINEPHrine 1 %-1:434312 SOLN injection     No current facility-administered medications for this visit.    .    [facility]  Diagnostic and Treamtent-3rd Floor  [address] [place] [address]     Name: JAVI CASTELLANO  MRN: [id number]  : 1980  Study Date: 2018 03:10 PM  Age: 38 yrs  Gender: Male  Patient Location: [facility] [place]  Reason For Study: Chronic systolic congestive heart failure (H)  Ordering Physician: ENEDINA CHIRINOS  Referring Physician: ENEDINA CHIRINOS  Performed By: Oscar Rothman RDCS     BSA: 1.8 m2  Height: 63 in  Weight: 173 lb  _____________________________________________________________________________  __        Procedure  Echocardiogram with two-dimensional, color and spectral Doppler performed.  _____________________________________________________________________________  __        Interpretation Summary  Severe left ventricular dilation is present. Severely (EF 20%) reduced left  ventricular function is present. Severe diffuse hypokinesis is present.  Global right ventricular function is mildly reduced.  Mild mitral insufficiency is present.  The inferior vena cava was normal in size with preserved respiratory  variability.  No pericardial effusion is present.  _____________________________________________________________________________  __        Left Ventricle  Severe left ventricular dilation is present. Severely (EF 20%) reduced left  ventricular function is present. Grade II or moderate diastolic dysfunction.  Severe diffuse hypokinesis is present. There is no thrombus.     Right Ventricle  The right ventricle is normal size. Global right ventricular function is  mildly reduced.     Atria  Both atria appear normal.        Mitral Valve  Mild mitral insufficiency is present.     Aortic  Valve  Aortic valve is normal in structure and function.     Tricuspid Valve  Trace tricuspid insufficiency is present. The peak velocity of the tricuspid  regurgitant jet is not obtainable. Pulmonary artery systolic pressure cannot  be assessed.     Pulmonic Valve  Trace pulmonic insufficiency is present.     Vessels  The aorta root is normal. The inferior vena cava was normal in size with  preserved respiratory variability.     Pericardium  No pericardial effusion is present.        Compared to Previous Study  This study was compared with the study from 18, LV function has slightly  improved .  _____________________________________________________________________________  __     MMode/2D Measurements & Calculations  IVSd: 0.85 cm  LVIDd: 5.8 cm  LVIDs: 5.3 cm  LVPWd: 0.93 cm  FS: 9.9 %  LV mass(C)d: 203.6 grams  LV mass(C)dI: 112.0 grams/m2  Ao root diam: 3.3 cm  asc Aorta Diam: 3.2 cm  LVOT diam: 2.4 cm  LVOT area: 4.4 cm2  LA Volume (BP): 55.8 ml  LA Volume Index (BP): 30.7 ml/m2     RWT: 0.32        Doppler Measurements & Calculations  MV E max tad: 82.9 cm/sec  MV A max tad: 91.7 cm/sec  MV E/A: 0.90  MV dec slope: 871.8 cm/sec2  MV dec time: 0.10 sec  PA acc time: 0.09 sec  E/E' av.7  Lateral E/e': 17.0  Medial E/e': 20.4     _____________________________________________________________________________  __        PHYSICAL EXAMINATION:  Examination shows an alert, oriented [place]  male.  Blood pressure is 124/80, heart rate 78 and slightly irregular secondary to frequent prematures.  Jugular venous pressure is increased.  Hepatojugular reflex is positive.  Chest shows rhonchi that clear with cough.  There are no wheezes.  There is no evidence of left or right ventricular overactivity.  There is an apical 4th heart sound.  No murmurs are noted.  The abdomen shows an old surgical incision.  There is no obvious ascites.  There is 2+ peripheral edema.  He has normal gait, station and mentation.   He is multiply tattooed.       Results for JAVI CASTELLANO (MRN [id number]) as of 12/6/2018 16:06   Ref. Range 8/14/2018 18:38 8/28/2018 14:45 8/28/2018 15:53 9/18/2018 16:57   Sodium Latest Ref Range: 133 - 144 mmol/L 142   140   Potassium Latest Ref Range: 3.4 - 5.3 mmol/L 3.8   3.6   Chloride Latest Ref Range: 94 - 109 mmol/L 108   107   Carbon Dioxide Latest Ref Range: 20 - 32 mmol/L 27   27   Urea Nitrogen Latest Ref Range: 7 - 30 mg/dL 18   19   Creatinine Latest Ref Range: 0.66 - 1.25 mg/dL 1.72 (H)   1.50 (H)   GFR Estimate Latest Ref Range: >60 mL/min/1.7m2 45 (L)   53 (L)   GFR Estimate If Black Latest Ref Range: >60 mL/min/1.7m2 54 (L)   64   Calcium Latest Ref Range: 8.5 - 10.1 mg/dL 8.5   8.5   Anion Gap Latest Ref Range: 3 - 14 mmol/L 7   6   Glucose Latest Ref Range: 70 - 99 mg/dL 100 (H)   98   XR CHEST 2 VW Unknown  Rpt     ECHO COMPLETE Unknown   Rpt         The patient is not a candidate at this time for advanced therapies in view of his chemical dependency.  He would need to go through treatment as well as be clean, including random urine checking, for at least 6 months to a year following treatment.    He clinically stable at the point from a cardiovascular point of view. Unfortunately, he is still using methamphetamine 3x/week.  No immediate members of his family are aware of current use, however, his girlfriend is aware.  He does not have a defibrillator and we discussed again the risk and benefit of insertion.  He wishes to think about this!  We also talked about repeat chemical dependency treatment and he will think about it.  I quoted him a 20+% per annum mortality.     His blood pressure is in the 130 range and heart rate in the 90 range and therefore am increasing carvedilol to 18.75 PO BID and asking his to return after Antonio for further assessment and drug titration and conversion from ACE to Entresto.        Thank you very much for allowing us to see him.      Sincerely yours,       Ethan Freitas MD                 D: 2018   T: 2018   MT: destiny      Name:     JAVI CASTELLANO   MRN:      [id number]        Account:      [id number]   :      1980           Service Date: 2018      Document: [id number]

## 2018-12-06 NOTE — NURSING NOTE
Vitals completed successfully and medication reconciled. 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 Stage C, NYHA Class II

## 2018-12-19 ENCOUNTER — TELEPHONE (OUTPATIENT)
Dept: CARDIOLOGY | Facility: CLINIC | Age: 38
End: 2018-12-19

## 2018-12-19 NOTE — TELEPHONE ENCOUNTER
Health Call Center    Phone Message    May a detailed message be left on voicemail: yes    Reason for Call: Medication Refill Request    Has the patient contacted the pharmacy for the refill? Yes   Name of medication being requested: carvedilol (COREG) 6.25 MG tablet  Provider who prescribed the medication: Dr. Freitas  Pharmacy: [facility]  Date medication is needed: ASAP. I informed the caller that both scripts (6.25 and 12.5) were confirmed by the pharmacy 12/6 but they are claiming the pharmacy only received 12.5. They are going to call the pharmacy and check one more time. Please resend if necessary.      Action Taken: Message routed to:  [facility] ([facility] Cardiology

## 2018-12-21 NOTE — TELEPHONE ENCOUNTER
Called pharmacy. They have refill on file and have contact patient. Called patients S.O. Who called in. She confirms they were able to  medication.   Cuca Flores RN

## 2019-01-11 DIAGNOSIS — I50.82 BIVENTRICULAR HEART FAILURE (H): Primary | ICD-10-CM

## 2019-03-20 ENCOUNTER — COMMUNICATION - HEALTHEAST (OUTPATIENT)
Dept: CARDIOLOGY | Facility: CLINIC | Age: 39
End: 2019-03-20

## 2019-03-20 DIAGNOSIS — I50.21 CHF (CONGESTIVE HEART FAILURE), NYHA CLASS I, ACUTE, SYSTOLIC (H): ICD-10-CM

## 2019-03-21 DIAGNOSIS — I50.43 ACUTE ON CHRONIC COMBINED SYSTOLIC AND DIASTOLIC HEART FAILURE (H): ICD-10-CM

## 2019-03-21 DIAGNOSIS — I50.82 BIVENTRICULAR HEART FAILURE (H): ICD-10-CM

## 2019-03-24 RX ORDER — HYDRALAZINE HYDROCHLORIDE 100 MG/1
100 TABLET, FILM COATED ORAL 3 TIMES DAILY
Qty: 90 TABLET | Refills: 0 | Status: SHIPPED | OUTPATIENT
Start: 2019-03-24 | End: 2023-01-25

## 2019-03-24 NOTE — TELEPHONE ENCOUNTER
hydrALAZINE (APRESOLINE) 100 MG tablet   Last Written Prescription Date:  12/6/18  Last Fill Quantity: 120  # refills: 1  Last Office Visit : 12/6/18  Future Office visit:  None    12/6/18  [place]  Return to our clinic first week of January for further medication increases       30 day to pharmacy   Past due for f/u    Scheduling has been notified to contact the pt for appointment.

## 2019-08-15 DIAGNOSIS — I50.43 ACUTE ON CHRONIC COMBINED SYSTOLIC AND DIASTOLIC HEART FAILURE (H): ICD-10-CM

## 2019-08-18 RX ORDER — CARVEDILOL 12.5 MG/1
12.5 TABLET ORAL 2 TIMES DAILY WITH MEALS
Qty: 180 TABLET | Refills: 0 | Status: SHIPPED | OUTPATIENT
Start: 2019-08-18 | End: 2020-01-11

## 2019-08-18 NOTE — TELEPHONE ENCOUNTER
Last Clinic Visit: 12/6/2018  [facility]    *BP within past 12 months just at protocol [place].     12/06/18 141/88

## 2019-09-15 DIAGNOSIS — I10 BENIGN ESSENTIAL HYPERTENSION: ICD-10-CM

## 2019-09-15 DIAGNOSIS — I50.43 ACUTE ON CHRONIC COMBINED SYSTOLIC AND DIASTOLIC CONGESTIVE HEART FAILURE (H): ICD-10-CM

## 2019-09-15 DIAGNOSIS — I50.43 ACUTE ON CHRONIC COMBINED SYSTOLIC AND DIASTOLIC HEART FAILURE (H): ICD-10-CM

## 2019-09-16 RX ORDER — ISOSORBIDE MONONITRATE 60 MG/1
60 TABLET, EXTENDED RELEASE ORAL EVERY MORNING
Qty: 90 TABLET | Refills: 3 | OUTPATIENT
Start: 2019-09-16

## 2019-09-16 NOTE — TELEPHONE ENCOUNTER
Duplicate:  last fill 12-6-18 fo r90 tab w/3 RF, fax to requesting pharm [facility]   Store # [id number]

## 2020-01-10 DIAGNOSIS — I50.43 ACUTE ON CHRONIC COMBINED SYSTOLIC AND DIASTOLIC HEART FAILURE (H): ICD-10-CM

## 2020-01-10 DIAGNOSIS — I10 BENIGN ESSENTIAL HYPERTENSION: ICD-10-CM

## 2020-01-10 DIAGNOSIS — I50.43 ACUTE ON CHRONIC COMBINED SYSTOLIC AND DIASTOLIC CONGESTIVE HEART FAILURE (H): ICD-10-CM

## 2020-01-11 NOTE — TELEPHONE ENCOUNTER
"   losartan (COZAAR) 100 MG tablet  Last Written Prescription Date:  12/6/18  Last Fill Quantity: 90,   # refills: 3  Last Office Visit : 12/6/18  Future Office visit:  None        carvedilol (COREG) 12.5 MG tablet  Last Written Prescription Date:  8/18/19  Last Fill Quantity: 180,   # refills: 0      isosorbide mononitrate (IMDUR) 60 MG 24 hr tablet  Last Written Prescription Date:  12/6/18  Last Fill Quantity: 90,   # refills: 3      \"asking his to return after [place] for further assessment\"    Alida HENRY  Results reviewed by Yoselin Victoria NP and sent to patient via letter.  No change on med list    Scheduling has been notified to contact the pt for appointment.      Routing refill request to provider for review/approval because: lv 12/18, alida K+ ,bp  past due       "

## 2020-01-13 RX ORDER — LOSARTAN POTASSIUM 100 MG/1
TABLET ORAL
Qty: 90 TABLET | Refills: 0 | Status: SHIPPED | OUTPATIENT
Start: 2020-01-13 | End: 2023-01-27

## 2020-01-13 RX ORDER — ISOSORBIDE MONONITRATE 60 MG/1
60 TABLET, EXTENDED RELEASE ORAL EVERY MORNING
Qty: 90 TABLET | Refills: 0 | Status: SHIPPED | OUTPATIENT
Start: 2020-01-13 | End: 2023-01-25

## 2020-01-13 RX ORDER — CARVEDILOL 12.5 MG/1
12.5 TABLET ORAL 2 TIMES DAILY WITH MEALS
Qty: 180 TABLET | Refills: 0 | Status: SHIPPED | OUTPATIENT
Start: 2020-01-13 | End: 2023-01-25

## 2020-01-29 NOTE — TELEPHONE ENCOUNTER
Patient is requesting a refill on his medications. Patient has not been seen in over a year and cancelled his most recent appointment. Pharmacy was called and it is requested that they reach out to his primary care physician for refills. Patient needs to be seen in clinic for further refills.

## 2020-02-06 DIAGNOSIS — I50.43 ACUTE ON CHRONIC COMBINED SYSTOLIC AND DIASTOLIC HEART FAILURE (H): ICD-10-CM

## 2020-02-06 DIAGNOSIS — I50.82 BIVENTRICULAR HEART FAILURE (H): ICD-10-CM

## 2020-02-06 DIAGNOSIS — I50.43 ACUTE ON CHRONIC COMBINED SYSTOLIC AND DIASTOLIC CONGESTIVE HEART FAILURE (H): ICD-10-CM

## 2020-02-06 DIAGNOSIS — I10 BENIGN ESSENTIAL HYPERTENSION: ICD-10-CM

## 2020-02-10 RX ORDER — AMLODIPINE BESYLATE 2.5 MG/1
2.5 TABLET ORAL DAILY
Qty: 90 TABLET | Refills: 0 | Status: SHIPPED | OUTPATIENT
Start: 2020-02-10 | End: 2023-01-25

## 2020-05-07 ENCOUNTER — VIRTUAL VISIT (OUTPATIENT)
Dept: CARDIOLOGY | Facility: CLINIC | Age: 40
End: 2020-05-07
Attending: INTERNAL MEDICINE

## 2020-05-07 DIAGNOSIS — I50.22 CHRONIC SYSTOLIC CONGESTIVE HEART FAILURE (H): Primary | ICD-10-CM

## 2020-05-07 NOTE — PROGRESS NOTES
I was unable to establish contact with patient with all given numbers including that of his .    José Antonio Murillo MD   [address] [address]   FAX:  [phone]         Demetrio Parker MD    [address] [place] [id number] [phone] (Fax)        RE:      Javi Patterson   MRN:   [id number]   :   1980       IMPRESSION:   1.  Biventricular acute on chronic congestive heart failure.   2.  History of methamphetamine use (recurrent).   A.  History of multiple positive urine tests.   3.  History of smoking.   4.  Family history of atherosclerotic cardiovascular disease.   5.  Family history of sudden death.   6.  History of appendiceal rupture.     Discusion and plan  1. The risk of sudden death and AICD  prevention would be discussed  2. Lab to include cmp bnp cbc, iron, magnesium, urine drug screen            Name: JAVI PATTERSON  MRN: [id number]  : 1980  Study Date: 2018 03:10 PM  Age: 38 yrs  Gender: Male  [place] [facility]  Reason For Study: Chronic systolic congestive heart failure (H)  Ordering Physician: ENEDINA CHIRINOS  Referring Physician: ENEDINA CHIRINOS  Performed By: Oscar Rothman RDCS     BSA: 1.8 m2  Height: 63 in  Weight: 173 lb  _____________________________________________________________________________  __        Procedure  Echocardiogram with two-dimensional, color and spectral Doppler performed.  _____________________________________________________________________________  __        Interpretation Summary  Severe left ventricular dilation is present. Severely (EF 20%) reduced left  ventricular function is present. Severe diffuse hypokinesis is present.  Global right ventricular function is mildly reduced.  Mild mitral insufficiency is present.  The inferior vena cava was normal in size with preserved respiratory  variability.  No pericardial effusion is  present.  _____________________________________________________________________________  __        Left Ventricle  Severe left ventricular dilation is present. Severely (EF 20%) reduced left  ventricular function is present. Grade II or moderate diastolic dysfunction.  Severe diffuse hypokinesis is present. There is no thrombus.     Right Ventricle  The right ventricle is normal size. Global right ventricular function is  mildly reduced.     Atria  Both atria appear normal.        Mitral Valve  Mild mitral insufficiency is present.     Aortic Valve  Aortic valve is normal in structure and function.     Tricuspid Valve  Trace tricuspid insufficiency is present. The peak velocity of the tricuspid  regurgitant jet is not obtainable. Pulmonary artery systolic pressure cannot  be assessed.     Pulmonic Valve  Trace pulmonic insufficiency is present.     Vessels  The aorta root is normal. The inferior vena cava was normal in size with  preserved respiratory variability.     Pericardium  No pericardial effusion is present.        Compared to Previous Study  This study was compared with the study from 18, LV function has slightly  improved .  _____________________________________________________________________________  __     MMode/2D Measurements & Calculations  IVSd: 0.85 cm  LVIDd: 5.8 cm  LVIDs: 5.3 cm  LVPWd: 0.93 cm  FS: 9.9 %  LV mass(C)d: 203.6 grams  LV mass(C)dI: 112.0 grams/m2  Ao root diam: 3.3 cm  asc Aorta Diam: 3.2 cm  LVOT diam: 2.4 cm  LVOT area: 4.4 cm2  LA Volume (BP): 55.8 ml  LA Volume Index (BP): 30.7 ml/m2     RWT: 0.32        Doppler Measurements & Calculations  MV E max tad: 82.9 cm/sec  MV A max tad: 91.7 cm/sec  MV E/A: 0.90  MV dec slope: 871.8 cm/sec2  MV dec time: 0.10 sec  PA acc time: 0.09 sec  E/E' av.7  Lateral E/e': 17.0  Medial E/e': 20.4     _____________________________________________________________________________  __

## 2020-11-22 ENCOUNTER — HEALTH MAINTENANCE LETTER (OUTPATIENT)
Age: 40
End: 2020-11-22

## 2020-12-11 ENCOUNTER — CARE COORDINATION (OUTPATIENT)
Dept: CARDIOLOGY | Facility: CLINIC | Age: 40
End: 2020-12-11

## 2020-12-11 DIAGNOSIS — I50.43 ACUTE ON CHRONIC COMBINED SYSTOLIC AND DIASTOLIC HEART FAILURE (H): Primary | ICD-10-CM

## 2021-01-07 ENCOUNTER — CARE COORDINATION (OUTPATIENT)
Dept: CARDIOLOGY | Facility: CLINIC | Age: 41
End: 2021-01-07

## 2021-01-07 ENCOUNTER — VIRTUAL VISIT (OUTPATIENT)
Dept: CARDIOLOGY | Facility: CLINIC | Age: 41
End: 2021-01-07
Attending: INTERNAL MEDICINE
Payer: COMMERCIAL

## 2021-01-07 ENCOUNTER — RECORDS - HEALTHEAST (OUTPATIENT)
Dept: ADMINISTRATIVE | Facility: OTHER | Age: 41
End: 2021-01-07

## 2021-01-07 DIAGNOSIS — I27.20 PULMONARY HYPERTENSION (H): ICD-10-CM

## 2021-01-07 DIAGNOSIS — I10 BENIGN ESSENTIAL HYPERTENSION: ICD-10-CM

## 2021-01-07 DIAGNOSIS — N18.4 CKD (CHRONIC KIDNEY DISEASE) STAGE 4, GFR 15-29 ML/MIN (H): ICD-10-CM

## 2021-01-07 DIAGNOSIS — I50.43 ACUTE ON CHRONIC COMBINED SYSTOLIC AND DIASTOLIC HEART FAILURE (H): Primary | ICD-10-CM

## 2021-01-07 PROCEDURE — 99215 OFFICE O/P EST HI 40 MIN: CPT | Mod: 95 | Performed by: INTERNAL MEDICINE

## 2021-01-07 NOTE — LETTER
2021      RE: Palomo Patterson  [address]  [place] [address]       Dear Colleague,    Thank you for the opportunity to participate in the care of your patient, Palomo Patterson, at the [facility]. Please see a copy of my visit note below.      .    José Antonio Murillo MD   [address] [address]   FAX:  [phone]         Demetrio Parker MD    [address] [place] [id number] [phone] (Fax)        RE:      Palomo Patterson         José Antonio Murillo MD   [address] [address]   FAX:  [phone]       MRN:   [id number]   :   1980       IMPRESSION:   1.  Biventricular acute on chronic congestive heart failure.   2.  History of methamphetamine use (recurrent).   A.  History of multiple positive urine tests.   3.  History of smoking.   4.  Family history of atherosclerotic cardiovascular disease.   5.  Family history of sudden death.   6.  History of appendiceal rupture.  7.  CKD 3A  8.  Recent COVID disease    I spent 20 minutes on phone with patient's permission and another 20 minutes talking with [facility].      I was finally able to establish contact with the patient who is currently incarcerated in the shelter at [facility]. ([phone])    Plan:  1. I asked correctional facility to obtain 2D echocardiogram, CMP, CBC and BNP in order to assess LV function.  They will email result to me  2. RTC phone visit in 1 month.    His has been there since early .  His interim history is somewhat vague but denies palpitation, pre-syncope, syncope, PND, orthopnea, ankle edema, major weight changes.     Constitutional:without weight loss, fever, chills, night sweats  HEENT: without visual changes, swallow difficulties but loss of smellwith  COVID  Pulmonary: without shortness of breath, cough, wheeze,  hemoptysis  Cardiac: without chest pain, NEWMAN, PND, orthopnea, edema, palpitation, pre-syncope, syncope,  GI: without diarrhea, constipation, jaundice, melena, GERD, hematemesis  : without frequency, urgency, dysuria, hematuria  Skin: rash, bruise, open lesions  Neuro: without TIA, focal neurologic complaints, seizure, trauma  Ortho: without pain, swelling, mobility impairment  Endocrine: diabetes, thyroid, heat/cold intolerance, polyuria, polyphagia, change bowel habits.  Sleep:no ZACHARY, periodic breathing, fatigue  Other:remote history amphetamine     Current Outpatient Medications   Medication     amLODIPine (NORVASC) 2.5 MG tablet     benzonatate (TESSALON) 100 MG capsule     carvedilol (COREG) 12.5 MG tablet     carvedilol (COREG) 6.25 MG tablet     furosemide (LASIX) 20 MG tablet     hydrALAZINE (APRESOLINE) 100 MG tablet     isosorbide mononitrate (IMDUR) 60 MG 24 hr tablet     lidocaine-EPINEPHrine 1 %-1:330526 SOLN injection     losartan (COZAAR) 100 MG tablet     losartan (COZAAR) 50 MG tablet     No current facility-administered medications for this visit.           Medications are reviewed an consist of Isordil, hydralazine, carvedilol, losartan and furosemide.      I contacted the long-term and spoke with [facility] staff to review their chart.  His estimated GFR has improved to 66, potassium 4.0.  He has been hypertensive.  COVID smear + in past.          Name: JAVI CASTELLANO  MRN: [id number]  : 1980  Study Date: 2018 03:10 PM  Age: 38 yrs  Gender: Male  [place] [facility] [place]  Reason For Study: Chronic systolic congestive heart failure (H)  Ordering Physician: ENEDINA CHIRINOS  Referring Physician: ENEDINA CHIRINOS  Performed By: Oscar Rothman NATACHA     BSA: 1.8 m2  Height: 63 in  Weight: 173 lb  _____________________________________________________________________________  __        Procedure  Echocardiogram with two-dimensional, color and spectral Doppler  performed.  _____________________________________________________________________________  __        Interpretation Summary  Severe left ventricular dilation is present. Severely (EF 20%) reduced left  ventricular function is present. Severe diffuse hypokinesis is present.  Global right ventricular function is mildly reduced.  Mild mitral insufficiency is present.  The inferior vena cava was normal in size with preserved respiratory  variability.  No pericardial effusion is present.  _____________________________________________________________________________  __        Left Ventricle  Severe left ventricular dilation is present. Severely (EF 20%) reduced left  ventricular function is present. Grade II or moderate diastolic dysfunction.  Severe diffuse hypokinesis is present. There is no thrombus.     Right Ventricle  The right ventricle is normal size. Global right ventricular function is  mildly reduced.     Atria  Both atria appear normal.        Mitral Valve  Mild mitral insufficiency is present.     Aortic Valve  Aortic valve is normal in structure and function.     Tricuspid Valve  Trace tricuspid insufficiency is present. The peak velocity of the tricuspid  regurgitant jet is not obtainable. Pulmonary artery systolic pressure cannot  be assessed.     Pulmonic Valve  Trace pulmonic insufficiency is present.     Vessels  The aorta root is normal. The inferior vena cava was normal in size with  preserved respiratory variability.     Pericardium  No pericardial effusion is present.        Compared to Previous Study  This study was compared with the study from 4.9.18, LV function has slightly  improved .  _____________________________________________________________________________  __     MMode/2D Measurements & Calculations  IVSd: 0.85 cm  LVIDd: 5.8 cm  LVIDs: 5.3 cm  LVPWd: 0.93 cm  FS: 9.9 %  LV mass(C)d: 203.6 grams  LV mass(C)dI: 112.0 grams/m2  Ao root diam: 3.3 cm  asc Aorta Diam: 3.2 cm  LVOT diam: 2.4  cm  LVOT area: 4.4 cm2  LA Volume (BP): 55.8 ml  LA Volume Index (BP): 30.7 ml/m2     RWT: 0.32        Doppler Measurements & Calculations  MV E max tad: 82.9 cm/sec  MV A max tad: 91.7 cm/sec  MV E/A: 0.90  MV dec slope: 871.8 cm/sec2  MV dec time: 0.10 sec  PA acc time: 0.09 sec  E/E' av.7  Lateral E/e': 17.0  Medial E/e': 20.4     _____________________________________________________________________________  __      Please do not hesitate to contact me if you have any questions/concerns.     Sincerely,     Ethan Freitas MD

## 2021-01-07 NOTE — PROGRESS NOTES
.    José Antonio Murillo MD   [address] [address]   FAX:  [phone]         Demetrio Parker MD    [address] [place] [address] [id number] [phone] (Fax)        RE:      [facility] FAULKNER Leonardo Murillo MD   [address] [address]   FAX:  [phone]                MRN:   [id number]   :   1980       IMPRESSION:   1.  Biventricular acute on chronic congestive heart failure.   2.  History of methamphetamine use (recurrent).   A.  History of multiple positive urine tests.   3.  History of smoking.   4.  Family history of atherosclerotic cardiovascular disease.   5.  Family history of sudden death.   6.  History of appendiceal rupture.  7.  CKD 3A  8.  Recent COVID disease    I spent 20 minutes on phone with patient's permission and another 20 minutes talking with [facility].      I was finally able to establish contact with the patient who is currently incarcerated in the long-term at [facility]. ([phone])    Plan:  1. I asked correctional facility to obtain 2D echocardiogram, CMP, CBC and BNP in order to assess LV function.  They will email result to me  2. RTC phone visit in 1 month.    His has been there since early .  His interim history is somewhat vague but denies palpitation, pre-syncope, syncope, PND, orthopnea, ankle edema, major weight changes.     Constitutional:without weight loss, fever, chills, night sweats  HEENT: without visual changes, swallow difficulties but loss of smellwith  COVID  Pulmonary: without shortness of breath, cough, wheeze, hemoptysis  Cardiac: without chest pain, NEWMAN, PND, orthopnea, edema, palpitation, pre-syncope, syncope,  GI: without diarrhea, constipation, jaundice, melena, GERD, hematemesis  : without frequency, urgency, dysuria, hematuria  Skin: rash, bruise, open lesions  Neuro: without TIA, focal neurologic complaints, seizure,  trauma  Ortho: without pain, swelling, mobility impairment  Endocrine: diabetes, thyroid, heat/cold intolerance, polyuria, polyphagia, change bowel habits.  Sleep:no ZACHARY, periodic breathing, fatigue  Other:remote history amphetamine     Current Outpatient Medications   Medication     amLODIPine (NORVASC) 2.5 MG tablet     benzonatate (TESSALON) 100 MG capsule     carvedilol (COREG) 12.5 MG tablet     carvedilol (COREG) 6.25 MG tablet     furosemide (LASIX) 20 MG tablet     hydrALAZINE (APRESOLINE) 100 MG tablet     isosorbide mononitrate (IMDUR) 60 MG 24 hr tablet     lidocaine-EPINEPHrine 1 %-[id number] SOLN injection     losartan (COZAAR) 100 MG tablet     losartan (COZAAR) 50 MG tablet     No current facility-administered medications for this visit.           Medications are reviewed an consist of Isordil, hydralazine, carvedilol, losartan and furosemide.      I contacted the [facility] and spoke with [facility] staff to review their chart.  His estimated GFR has improved to 66, potassium 4.0.  He has been hypertensive.  COVID smear + in past.                  Name: JAVI CASTELLANO  MRN: [id number]  : 1980  Study Date: 2018 03:10 PM  Age: 38 yrs  Gender: Male  [place] [facility] [place] Study: Chronic systolic congestive heart failure (H)  Ordering Physician: ENEDINA CHIRINOS  Referring Physician: ENEDINA CHIRINOS  Performed By: Oscar Rothman RDCS     BSA: 1.8 m2  Height: 63 in  Weight: 173 lb  _____________________________________________________________________________  __        Procedure  Echocardiogram with two-dimensional, color and spectral Doppler performed.  _____________________________________________________________________________  __        Interpretation Summary  Severe left ventricular dilation is present. Severely (EF 20%) reduced left  ventricular function is present. Severe diffuse hypokinesis is present.  Global right ventricular function is mildly reduced.  Mild mitral insufficiency  is present.  The inferior vena cava was normal in size with preserved respiratory  variability.  No pericardial effusion is present.  _____________________________________________________________________________  __        Left Ventricle  Severe left ventricular dilation is present. Severely (EF 20%) reduced left  ventricular function is present. Grade II or moderate diastolic dysfunction.  Severe diffuse hypokinesis is present. There is no thrombus.     Right Ventricle  The right ventricle is normal size. Global right ventricular function is  mildly reduced.     Atria  Both atria appear normal.        Mitral Valve  Mild mitral insufficiency is present.     Aortic Valve  Aortic valve is normal in structure and function.     Tricuspid Valve  Trace tricuspid insufficiency is present. The peak velocity of the tricuspid  regurgitant jet is not obtainable. Pulmonary artery systolic pressure cannot  be assessed.     Pulmonic Valve  Trace pulmonic insufficiency is present.     Vessels  The aorta root is normal. The inferior vena cava was normal in size with  preserved respiratory variability.     Pericardium  No pericardial effusion is present.        Compared to Previous Study  This study was compared with the study from 18, LV function has slightly  improved .  _____________________________________________________________________________  __     MMode/2D Measurements & Calculations  IVSd: 0.85 cm  LVIDd: 5.8 cm  LVIDs: 5.3 cm  LVPWd: 0.93 cm  FS: 9.9 %  LV mass(C)d: 203.6 grams  LV mass(C)dI: 112.0 grams/m2  Ao root diam: 3.3 cm  asc Aorta Diam: 3.2 cm  LVOT diam: 2.4 cm  LVOT area: 4.4 cm2  LA Volume (BP): 55.8 ml  LA Volume Index (BP): 30.7 ml/m2     RWT: 0.32        Doppler Measurements & Calculations  MV E max tad: 82.9 cm/sec  MV A max tad: 91.7 cm/sec  MV E/A: 0.90  MV dec slope: 871.8 cm/sec2  MV dec time: 0.10 sec  PA acc time: 0.09 sec  E/E' av.7  Lateral E/e': 17.0  Medial E/e': 20.4      _____________________________________________________________________________  __

## 2021-04-02 ENCOUNTER — TELEPHONE (OUTPATIENT)
Dept: CARDIOLOGY | Facility: CLINIC | Age: 41
End: 2021-04-02

## 2021-05-31 VITALS — HEIGHT: 64 IN | WEIGHT: 181 LBS | BODY MASS INDEX: 30.9 KG/M2

## 2021-05-31 VITALS — WEIGHT: 173 LBS | HEIGHT: 63 IN | BODY MASS INDEX: 30.65 KG/M2

## 2021-06-01 VITALS — BODY MASS INDEX: 30.56 KG/M2 | HEIGHT: 64 IN | WEIGHT: 179 LBS

## 2021-06-01 VITALS — HEIGHT: 64 IN | BODY MASS INDEX: 30.05 KG/M2 | WEIGHT: 176 LBS

## 2021-06-01 VITALS — WEIGHT: 176 LBS | BODY MASS INDEX: 30.21 KG/M2

## 2021-06-01 VITALS — BODY MASS INDEX: 28.17 KG/M2 | WEIGHT: 165 LBS | HEIGHT: 64 IN

## 2021-06-01 VITALS — WEIGHT: 185 LBS | BODY MASS INDEX: 31.76 KG/M2

## 2021-06-01 VITALS — BODY MASS INDEX: 30.29 KG/M2 | WEIGHT: 171 LBS

## 2021-06-13 NOTE — PROGRESS NOTES
Assessment/plan  1. Hypertension  Uncontrolled.   Non compliant with follow up or medication use.   Restarted on medication. Urged to be compliant.   We discussed long term complications of uncontrolled blood pressure. He verbalized understanding.   Advised on low salt diet, low fat diet, regular aerobic exercise to improve blood pressure control.   Advised to follow up in one month for blood pressure check. Advised to be fasting for collection of lipids.   Discussed reasons to be seen urgently.   - losartan-hydrochlorothiazide (HYZAAR) 50-12.5 mg per tablet; TAKE 1 TABLET BY MOUTH DAILY.  Dispense: 90 tablet; Refill: 0    2. URI (upper respiratory infection)  3. Smoker  Rapid strep negative. Will follow culture.   Afebrile. No respiratory distress. Lung exam normal. Deferred imaging for today. Discussed indications of antibiotic use.   Discussed supportive care.   Discussed possible post nasal drip symptoms. Advised to start intranasal steroid. This was prescribed. Cautioned over possible adverse affects. Discussed appropriate use.   Follow up if no change or worsening.   Counseled on smoking cessation.   - Rapid Strep A Screen-Throat  - Group A Strep, RNA Direct Detection, Throat      Total time > 25 minutes, more than half spent counseling the above.     Subjective  [age] year old male here with his girlfriend.   History of hypertension. Has been contacted multiple times over the last year for follow up and treatment of uncontrolled blood pressure. He says he did not receive any calls.   Is not taking his medication. He reports he ran out a few weeks ago. Sometimes he has a headache. He denies chest pain, swelling, weakness in any extremities.   He also reports cough, sore throat. It started two or three days ago. His girlfriend requests a chest x ray. His girlfriend asks if he can have some antibiotics.   He note some production of white sputum, but mostly dry cough. He notes itchy throat more than sore. He  reports some nasal congestion that has clear drainage. No fever. Tolerating diet. Normal urine and stool. Not aware of any sick contacts.   He notes family history of hypertension in all family members.   He notes renal failure in his sister.   Denies any past surgery.     ROS: 12 systems reviewed, all negative except for what is mentioned in HPI.         No past medical history on file.  Patient Active Problem List   Diagnosis     Cough     Joint Pain, Localized In The Wrist     Hypertension     Needs MMR and Varicella vaccinations     No past surgical history on file.  No family history on file.  Social History     Social History     Marital status: Single     Spouse name: N/A     Number of children: N/A     Years of education: N/A     Occupational History     Not on file.     Social History Main Topics     Smoking status: Current Every Day Smoker     Packs/day: 0.25     Types: Cigarettes     Smokeless tobacco: Not on file     Alcohol use Not on file     Drug use: Not on file     Sexual activity: Not on file     Other Topics Concern     Not on file     Social History Narrative     Current Outpatient Prescriptions on File Prior to Visit   Medication Sig Dispense Refill     amLODIPine (NORVASC) 10 MG tablet Take 1 tablet (10 mg total) by mouth daily. 30 tablet 0     blood pressure monitor Kit Please provide patient with an automatic blood pressure cuff to use as needed to check blood pressure.  Diagnosis: hypertension. 1 each 0     losartan-hydrochlorothiazide (HYZAAR) 50-12.5 mg per tablet Take 1 tablet by mouth daily. 90 tablet 1     No current facility-administered medications on file prior to visit.      Objective  Vitals:    11/01/17 1432   BP: (!) 152/114   Pulse: (!) 113   Resp: 28   Temp: 98.2  F (36.8  C)   SpO2: 99%       General Appearance:  Alert, cooperative, no distress, appears stated age   Head:  Normocephalic, without obvious abnormality, atraumatic   Eyes:  PERRL, conjunctiva/corneas clear, EOM's  intact   Ears:  Normal TM's and external ear canals, both ears   Nose: Nares normal, septum midline,mucosa normal, no drainage    Throat: Lips, mucosa, and tongue normal; posterior pharynx with post nasal drip, uvula midline   Neck: Supple, symmetrical, trachea midline, no adenopathy;  thyroid: not enlarged, symmetric, no tenderness/mass/nodules; no carotid bruit or JVD   Lungs:   Clear to auscultation bilaterally, respirations unlabored   Heart:  Regular rate and rhythm, S1 and S2 normal, no murmur, rub, or gallop   Extremities: Extremities normal, atraumatic, no cyanosis or edema   Skin: Skin color, texture, turgor normal, no rashes or lesions   Lymph nodes: Cervical, supraclavicular nodes normal   Neurologic: Normal, CN 2-12 intact

## 2021-06-15 NOTE — PROGRESS NOTES
Subjective: This patient comes in for evaluation he has history of hypertension.  He has been taking his medicine he is on amlodipine as well as losartan HCT.    I refill those for him.  Will recheck in 2-3 weeks check BMP.    Please see dosages below.    Patient's had acute symptoms of cough congestion wheezing.    Coughing of green yellowish phlegm.  He smokes one third pack per day.    No vomiting no diarrhea no rash.    Tobacco status: He  reports that he has been smoking Cigarettes.  He has been smoking about 0.25 packs per day. He does not have any smokeless tobacco history on file.    Patient Active Problem List    Diagnosis Date Noted     Smoker 11/02/2017     Needs MMR and Varicella vaccinations 12/27/2016     Cough      Joint Pain, Localized In The Wrist      Hypertension        Current Outpatient Prescriptions   Medication Sig Dispense Refill     amLODIPine (NORVASC) 10 MG tablet Take 1 tablet (10 mg total) by mouth daily. 90 tablet 1     amoxicillin-clavulanate (AUGMENTIN) 875-125 mg per tablet Take 1 tablet by mouth 2 (two) times a day for 10 days. 20 tablet 0     blood pressure monitor Kit Please provide patient with an automatic blood pressure cuff to use as needed to check blood pressure.  Diagnosis: hypertension. 1 each 0     codeine-guaiFENesin (GUAIFENESIN AC)  mg/5 mL liquid Take 10 mL by mouth 3 (three) times a day as needed. 240 mL 0     fluticasone (FLONASE) 50 mcg/actuation nasal spray 1 spray into each nostril daily. 16 g 2     losartan-hydrochlorothiazide (HYZAAR) 50-12.5 mg per tablet Take 1 tablet by mouth daily. 90 tablet 1     predniSONE (DELTASONE) 20 MG tablet 2 po qd for 5 days then 1 po qd for 5 days 15 tablet 0     No current facility-administered medications for this visit.        ROS: Review of systems negative other than as outlined above    Objective:    BP (!) 154/96 (Patient Site: Left Arm, Patient Position: Sitting, Cuff Size: Adult Large)  Pulse (!) 114  Temp 97.1  " F (36.2  C) (Oral)   Resp 20  Ht 5' 4\" (1.626 m)  Wt 181 lb (82.1 kg)  SpO2 96% Comment: at rest with room air  BMI 31.07 kg/m2  Body mass index is 31.07 kg/(m^2).      General appearance no acute distress    HEENT with mild congestion    Lungs had some slight expiratory wheezes he did have a persistent cough.  O2 sat 96%    He has no fever here no significant myalgias symptoms    Heart was regular rate at 100.    Abdomen soft nontender    Extremities without edema        Assessment:  1. Bronchitis  amoxicillin-clavulanate (AUGMENTIN) 875-125 mg per tablet    codeine-guaiFENesin (GUAIFENESIN AC)  mg/5 mL liquid    predniSONE (DELTASONE) 20 MG tablet   2. Essential hypertension  amLODIPine (NORVASC) 10 MG tablet    losartan-hydrochlorothiazide (HYZAAR) 50-12.5 mg per tablet   3. Smoker       Bronchitis in a smoker will treat with Augmentin cough medication and prednisone 20 mg 2 a day for the bronchospasm    Hypertension get back on his amlodipine and Hyzaar.    Quit smoking    Follow-up 2-3 weeks recheck blood pressure review cough.  If persisting will go on for chest x-ray, also check BMP    Plan: As discussed above    This transcription uses voice recognition software, which may contain typographical errors.  "

## 2021-06-16 NOTE — PROGRESS NOTES
Palomo Patterson  [address] [place] [address]  [phone] (home)     Primary cardiologist:  Dr. Murillo  PCP:  Demetrio Parker MD  Procedure:  Diagnostic Coronary angiogram.  H&P completed by:  Dr. Murillo on 3/7/18  Case MD:  Dr. Cantu or Dr. Longo  Admit date and time:  3/15/18 at 6 am.   Case start time:  8:30 am  Ordering MD:  Dr. Murillo  Diagnosis:  Cardiomyopathy, SOB.   Anticoagulation: None  CPAP: No  Bypass Grafts: No  Renal Issues: No  Allergies:   Allergies   Allergen Reactions     Atorvastatin Cough     Lisinopril      Diabetic?: No  Device?: No    Angiogram Teaching    Reason for Visit:  Patient seen for pre-procedure education in preparation for: Diagnostic Coronary angiogram.     Procedure Prep:  Cardiologist note dated: 3/7/18  EKG results obtained, dated: Morning of procedure.   Hemogram results obtained: Morning of procedure.   Basic Metabolic Panel results obtained: Morning of procedure.   Lipid Profile results obtained: Morning of procedure.     Patient Education  Explained indications/risks for diagnostic evaluation, including one or more of the following:  coronary angiogram, percutaneous arteritomy closure, less than 0.1% of acute myocardial infarction and CVA or death or any of the following to the degree that it could threaten life:  allergic reaction, arrhythmia, renal failure, hemorrhage, thrombosis and infection  Explained indications/risks for therapeutic interventions, including one or more of the following: stent, 2% or less risk of MI, less than 1% risk of CVA, emergency heart surgery, death, less than 4 % risk of vascular injury requiring surgical repair or blood transfusion and 20-30% risk of restonosis with a bare metal stent.  These risks are in addition to baseline risks associated with a Diagnostic Evaluation.  Patient states understanding of procedure and risks and agrees to proceed.    Pre-procedure instructions  Patient instructed to be NPO after midnight.  Patient  instructed to arrange for transportation home following procedure.  Patient instructed to have a responsible adult with them for 24 hours post-procedure.  Post-procedure follow up process.  Conscious sedation discussed.  The patient was given the pre-procedure letter (If requested) on 3/7/18    Pre-procedure medication instructions  Patient instructed on antiplatelet medication.  Continue medications as scheduled, with a small amount of water on the day of the procedure unless indicated.  Patient instructed to take 325 mg of Aspirin am of procedure: No  Other medication: instructed to take morning amlodipine, coreg, and cozaar. Hold all other medications, vitamins, and minerals a.m. of the procedure.    *PATIENTS RECORDS AVAILABLE IN [facility] UNLESS OTHERWISE INDICATED*    *Order set was entered on this date: 3/7/18      Patient Active Problem List   Diagnosis     Cough     Joint Pain, Localized In The Wrist     Hypertension     Needs MMR and Varicella vaccinations     Smoker     Nonischemic cardiomyopathy       Current Outpatient Prescriptions   Medication Sig Dispense Refill     albuterol (PROAIR HFA;PROVENTIL HFA;VENTOLIN HFA) 90 mcg/actuation inhaler Inhale 2 puffs every 4 (four) hours as needed. 18 g 1     blood pressure monitor Kit Please provide patient with an automatic blood pressure cuff to use as needed to check blood pressure.  Diagnosis: hypertension. 1 each 0     carvedilol (COREG) 3.125 MG tablet Take 1 tablet (3.125 mg total) by mouth 2 (two) times a day with meals. 60 tablet 3     codeine-guaiFENesin (GUAIFENESIN AC)  mg/5 mL liquid Take 10 mL by mouth 3 (three) times a day as needed. 240 mL 0     fluticasone (FLONASE) 50 mcg/actuation nasal spray 1 spray into each nostril daily. 16 g 2     fluticasone (FLOVENT HFA) 110 mcg/actuation inhaler Inhale 2 puffs 2 (two) times a day. 1 Inhaler 2     furosemide (LASIX) 20 MG tablet Take 1 tablet (20 mg total) by mouth 2 (two) times a day at 9am and 6pm.  60 tablet 3     inhalational spacing device Spcr Use with inhaler as needed 1 each 0     losartan (COZAAR) 50 MG tablet Take 1 tablet (50 mg total) by mouth daily. 90 tablet 4     No current facility-administered medications for this visit.        Allergies   Allergen Reactions     Atorvastatin Cough     Lisinopril        Plan  Patient instructed to have a responsible adult present for 24 hours post op.   Patient ready for procedure    DEBORAH Antunez

## 2021-06-16 NOTE — PROGRESS NOTES
Patient called the on-call line this evening regarding leg cramps.  The patient states that he previously had that when he was taking hydrochlorothiazide.  He is taking only a single dose of Lasix at this point but does note a fairly pronounced diuresis.  I suggested that he call into the office tomorrow morning and have a basic metabolic panel drawn as well as a magnesium level.

## 2021-06-16 NOTE — PROGRESS NOTES
Subjective: This patient comes in for evaluation this 37-year-old male has hypertension he is on amlodipine as well as losartan HCT.  Blood pressures been under control his heart rates a little on the fast side at 110.    He was seen for cough earlier this fall and then improved then developed some bronchitis symptoms.  Was seen at the walk-in clinic on 2/18/2018 started on prednisone and azithromycin and Proventil.    Patient does smoke.    He did improve.    He started to notice some swelling in his legs we stop the prednisone.  He thinks the swelling has improved    He has some discoloration through his knees anteriorly.  He is wondering if he might frost bit the area as he works outside on cars a lot.    Patient has normal pulses.  There is trace edema at this time.    I did check a BMP as well as BNP and an EKG.    His chest x-ray was normal in 2016 the chest x-ray from 2/18/2018 showed evidence of cardiomegaly and probable viral or atypical infiltrate.    There was some thickening of the airway.    I will also have him go on for an echocardiogram the EKG also showed some mild LVH.    Patient does complain of some orthopnea.    Tobacco status: He  reports that he has been smoking Cigarettes.  He has been smoking about 0.25 packs per day. He has never used smokeless tobacco.    Patient Active Problem List    Diagnosis Date Noted     Smoker 11/02/2017     Needs MMR and Varicella vaccinations 12/27/2016     Cough      Joint Pain, Localized In The Wrist      Hypertension        Current Outpatient Prescriptions   Medication Sig Dispense Refill     albuterol (PROAIR HFA;PROVENTIL HFA;VENTOLIN HFA) 90 mcg/actuation inhaler Inhale 2 puffs every 4 (four) hours as needed. 18 g 1     amLODIPine (NORVASC) 10 MG tablet Take 1 tablet (10 mg total) by mouth daily. 90 tablet 1     blood pressure monitor Kit Please provide patient with an automatic blood pressure cuff to use as needed to check blood pressure.  Diagnosis:  hypertension. 1 each 0     fluticasone (FLONASE) 50 mcg/actuation nasal spray 1 spray into each nostril daily. 16 g 2     fluticasone (FLOVENT HFA) 110 mcg/actuation inhaler Inhale 2 puffs 2 (two) times a day. 1 Inhaler 2     inhalational spacing device Spcr Use with inhaler as needed 1 each 0     losartan-hydrochlorothiazide (HYZAAR) 50-12.5 mg per tablet Take 1 tablet by mouth daily. 90 tablet 1     codeine-guaiFENesin (GUAIFENESIN AC)  mg/5 mL liquid Take 10 mL by mouth 3 (three) times a day as needed. 240 mL 0     No current facility-administered medications for this visit.        ROS: 10 point review of systems negative other than as outlined above    Objective:    /80 (Patient Site: Left Arm, Patient Position: Sitting, Cuff Size: Adult Large)  Pulse (!) 112  Resp 12  Wt 176 lb (79.8 kg)  BMI 30.21 kg/m2  Body mass index is 30.21 kg/(m^2).      General appearance no acute distress    HEENT neck without JVD oropharynx was clear nose was clear    Lungs had slight diminished breath sounds but clear.  His O2 sat at 97%    Heart was tachycardic rate at 110    EKG showed sinus tachycardia with mild LVH and nonspecific T-wave abnormality agree with reading below.    Extremities with trace edema    Skin with some slight discoloration, lattice distribution through the anterior needs.    Patient has pulses normal and through his feet    Negative Homans sign no increased warmth    Labs include BMP, BNP    EKG as outlined below, echocardiogram was ordered as well    Results for orders placed or performed in visit on 02/27/18   Electrocardiogram Perform - Clinic   Result Value Ref Range    SYSTOLIC BLOOD PRESSURE  mmHg    DIASTOLIC BLOOD PRESSURE  mmHg    VENTRICULAR RATE 112 BPM    ATRIAL RATE 112 BPM    P-R INTERVAL 132 ms    QRS DURATION 90 ms    Q-T INTERVAL 350 ms    QTC CALCULATION (BEZET) 477 ms    P Axis 61 degrees    R AXIS -12 degrees    T AXIS 107 degrees    MUSE DIAGNOSIS       Sinus  tachycardia  Minimal voltage criteria for LVH, may be normal variant  Nonspecific T wave abnormality  Abnormal ECG  No previous ECGs available         Assessment:  1. Peripheral edema  Echo Complete    BNP(B-type Natriuretic Peptide)   2. Essential hypertension  blood pressure monitor Kit    Basic Metabolic Panel   3. Cardiomegaly  Echo Complete    BNP(B-type Natriuretic Peptide)   4. Bronchitis     5. Tachycardia  Electrocardiogram Perform - Clinic   6. Dyspnea  Electrocardiogram Perform - Clinic     Peripheral edema with cardiomegaly await labs and echocardiogram    Tachycardia    Bronchitis treated    Hypertension controlled.    Plan: As outlined above encouraged to quit smoking    Will await results contact patient.  Consider going on to see cardiology, possibly going on to see pulmonary.  Patient will continue on the Proventil inhaler as needed    This transcription uses voice recognition software, which may contain typographical errors.    Addendum 3/6/2018 patient had an elevated BNP at 3462, also went on for echocardiogram which showed decreased LV function 25-30% ejection fraction some mild valvular changes.    He needs to see the cardiologist will get him into the rapid access clinic

## 2021-06-16 NOTE — PROGRESS NOTES
Assessment/Plan:   Persistent cough  Shortness of breath  Bronchitis with bronchospasm  Persistent cough for several months, flares of increased mucus and shortness of breath and wheezing.  Improved in early January with prednisone but came right back when prednisone was finished.  No edema, no chest pain.  Has high blood pressure and had cough with lisinopril, resumed losartan around the time the cough started.  Enlarged heart on CXR and bronchial thickening but no infiltrate or pulmonary edema.  O2 sats 97.  Smoker. Improved breathing after neb in the clinic.  Suspect a chronic bronchitis with bronchospasm, may need a controller.  Large heart needs further evaluation but there is no other sign of CHF today.  Cough may also be aggravated by ARB medication.  Will need close follow up with primary provider.    - XR Chest 2 Views  - albuterol nebulizer solution 3 mL (PROVENTIL); Take 3 mL by nebulization once.  - predniSONE (DELTASONE) 20 MG tablet; 40mg daily for 5 days then 20mg daily for 5 days  Dispense: 15 tablet; Refill: 0  - fluticasone (FLOVENT HFA) 110 mcg/actuation inhaler; Inhale 2 puffs 2 (two) times a day.  Dispense: 1 Inhaler; Refill: 2  - albuterol (PROAIR HFA;PROVENTIL HFA;VENTOLIN HFA) 90 mcg/actuation inhaler; Inhale 2 puffs every 4 (four) hours as needed.  Dispense: 18 g; Refill: 1  - inhalational spacing device Spcr; Use with inhaler as needed  Dispense: 1 each; Refill: 0  - azithromycin (ZITHROMAX Z-SIRI) 250 MG tablet; 2 tabs (500 mg ) day #1, then 1 tab (250 mg) days #2-5, total 5 days  Dispense: 6 tablet; Refill: 0    Fluids, rest, steam, nasal saline  Prednisone as directed  Azithromycin   Probiotics or yogurt   Albuterol inhaler as needed  Flovent inhaler twice a day   Follow up with Dr. Parker for further evaluation if needed and ongoing management.     Subjective:      Palomo Patterson is a 37 y.o. male who presents with cough shortness of breath and fatigue.  He has had cough for several months,  probably starting in October.  He was seen for it in November and again in early January at his primary clinic. He improved with the prednisone in January but the cough and chest tightness returned shortly after finishing the course. The last week or two he has had increased cough, now more productive of thick green phlegm, more SOB and fatigue.  No fever.  This week some increased URI as well.  No leg swelling.  No chest pain with exertion.  Worse at night often.  No prior history of wheeze or asthma or using inhalers.  He has hypertension and takes norvasc and losartan-hctz.  Previously he had tried lisinopril which caused a cough.  He reports starting the losartan around the time or before the cough started last fall.  He is smoking.  He notes the shortness of breath with activity but did play soccer without any trouble after the course of prednisone before things starting getting worse again a week or two ago.  Chest feels tight and his partner has noticed wheezy sounds at times.  He feels his breathing this week is also aggravated by having a cold so the nose is also stuffy.  He did not complete the Augmentin in January which was given with the prednisone since it upset his stomach (took for 7-8 days or so).    Allergies   Allergen Reactions     Atorvastatin Cough     Lisinopril      Current Outpatient Prescriptions on File Prior to Visit   Medication Sig Dispense Refill     amLODIPine (NORVASC) 10 MG tablet Take 1 tablet (10 mg total) by mouth daily. 90 tablet 1     losartan-hydrochlorothiazide (HYZAAR) 50-12.5 mg per tablet Take 1 tablet by mouth daily. 90 tablet 1     blood pressure monitor Kit Please provide patient with an automatic blood pressure cuff to use as needed to check blood pressure.  Diagnosis: hypertension. 1 each 0     codeine-guaiFENesin (GUAIFENESIN AC)  mg/5 mL liquid Take 10 mL by mouth 3 (three) times a day as needed. 240 mL 0     fluticasone (FLONASE) 50 mcg/actuation nasal spray 1  spray into each nostril daily. 16 g 2     No current facility-administered medications on file prior to visit.      Patient Active Problem List   Diagnosis     Cough     Joint Pain, Localized In The Wrist     Hypertension     Needs MMR and Varicella vaccinations     Smoker       Objective:     BP (!) 120/94 (Patient Site: Left Arm, Patient Position: Sitting, Cuff Size: Adult Large)  Pulse (!) 110  Temp 97.4  F (36.3  C) (Oral)   Resp 20  Wt 185 lb (83.9 kg)  SpO2 97%  BMI 31.76 kg/m2    Physical  General Appearance: Alert, cooperative, no distress.  AVSS.  Blood pressure slightly elevated, 120/94, similar to previous for him.  Head: Normocephalic, without obvious abnormality, atraumatic  Eyes: Conjunctivae are normal.   Ears: Normal TMs and external ear canals, both ears  Nose: mild congestion, no sinus pain.   Throat: Throat is normal.  No exudate.  No significant lesions  Neck: No adenopathy  Lungs: Decreased breath sounds and prolonged exp phase with forced expiration, otherwise clear to auscultation bilaterally, respirations unlabored.  Albuterol neb given in the office with subjective improvement in chest tightness.  On auscultation there was improved air movement  Heart: Regular rate and rhythm  Extremities: No lower extremity edema, no calf tenderness  Skin: no rashes or lesions  Psychiatric: Patient has a normal mood and affect.      CXR obtained and viewed by me showed a generous heart size but no definite infiltrate.    Per radiology:   [facility]  X-RAY CHEST, 2 VIEWS, FRONTAL AND LATERAL  2/18/2018 11:45 AM     INDICATION: prolonged cough and wheeze  COMPARISON: None.     FINDINGS: There is cardiomegaly. There is no focal airspace consolidation. There  is mild bronchial thickening at the lung bases. Findings could be seen with  bronchitis or viral infection. There is no pneumothorax or pleural effusion.  Pulmonary vascularity is normal.

## 2021-06-16 NOTE — PROGRESS NOTES
[facility] Consultation Note    Thank you, Dr. Parker, for asking the [facility] team to see Palomo Patterson in consultation today at our clinic to evaluate shortness of breath.      Assessment:   1.  Congestive heart failure from systolic dysfunction  2.  Nonischemic cardiomyopathy with four-chamber enlargement  3.  Essential hypertension     Plan:   We will discontinue the patient's amlodipine as well as his Hyzaar.  Would continue low-dose losartan at 50 mg a day.  Would begin carvedilol at 3.125 mg twice a day and furosemide at 20 mg a day twice a day until his shortness of breath is improved.  Will proceed with coronary angiography at [facility] to exclude underlying coronary artery disease.  A TSH level was obtained in the office.    We will have the patient seen in congestive heart failure clinic in 2 weeks and will follow up with me in 2 months.  Will reevaluate his ejection fraction in 3 months with a cardiac MR which will also exclude infiltrative diseases            Current History:   37-year-old male without any previous cardiac history beginning 3 weeks ago he developed progressive dyspnea on exertion without chest pain.  For the last week he has had orthopnea intermittently with intermittent PND.  Again the patient denies any chest pain consistent with angina.    Patient had a chest x-ray in February 18 that showed cardiac enlargement with no overt congestive heart failure.  A BNP level was obtained on February 27 which was 3462.  Echocardiogram done on March 6 showed four-chamber enlargement with global hypokinesis with ejection fraction of 25-30%    Past Medical History:   No past medical history on file.  Essential hypertension is his only medical problem    Past Surgical History:   No past surgical history on file.  Exploratory laparotomy for ruptured appendix    Family History:   No family history on file.  Father mother and brother all having myocardial  "infarctions with his brother having a heart attack at age 36    Social History:    reports that he has been smoking Cigarettes.  He has been smoking about 0.25 packs per day. He has never used smokeless tobacco. He reports that he uses illicit drugs, including Methamphetamines. He reports that he does not drink alcohol.  Ongoing smoking at a half a pack a day for 20 years.  Patient uses methamphetamine.  He is single but has a significant other with him today    Meds:   Scheduled Meds:  PRN Meds:.    Allergies:   Atorvastatin and Lisinopril    Review of Systems:   General: Fever  Eyes: WNL  Ears/Nose/Throat: WNL  Lungs: Cough, Shortness of Breath, Snoring, Wheezing  Heart: Shortness of Breath with activity, Leg Swelling  Stomach: WNL  Bladder: Frequent Urination at Night  Muscle/Joints: WNL  Skin: WNL  Nervous System: Dizziness  Mental Health: WNL     Blood: WNL      Objective:      Physical Exam  179 lb (81.2 kg)  5' 4\" (1.626 m)  Body mass index is 30.73 kg/(m^2).  BP (!) 131/101 (Patient Site: Left Arm, Patient Position: Sitting, Cuff Size: Adult Regular)  Pulse (!) 119  Resp (!) 30  Ht 5' 4\" (1.626 m)  Wt 179 lb (81.2 kg)  SpO2 100%  BMI 30.73 kg/m2    General Appearance:   alert, no apparent distress   HEENT:  no scleral icterus; the mucous membranes were pink and moist                                  Neck: jugular venous pressure is normal, no thyromegaly   Chest: the spine was straight and the chest was symmetric   Lungs:   respirations unlabored; the lungs are clear to auscultation   Cardiovascular:   regular rhythm with normal first and second heart sounds and no murmurs, clicks, or gallops. Carotid, radial, femoral, and posterior tibial pulses are intact; there are no carotid or femoral bruits.   Abdomen:  no organomegaly, masses, bruits, or tenderness; bowel sounds are present   Extremities: no cyanosis, clubbing, trace bilateral pedal edema   Skin: no xanthelasma   Neurologic: mood and affect are " appropriate         echocardiogram from March 6, 2018 results reviewed as above          Lab Review   Lab Results   Component Value Date     02/27/2018    K 4.0 02/27/2018     02/27/2018    CO2 27 02/27/2018    BUN 18 02/27/2018    CREATININE 1.32 (H) 02/27/2018    CALCIUM 9.2 02/27/2018     Lab Results   Component Value Date    WBC 7.1 06/30/2016    HGB 14.3 06/30/2016    HCT 42.1 06/30/2016    MCV 87 06/30/2016     06/30/2016     No results found for: CHOL, TRIG, HDL, LDLDIRECT      José Antonio Murillo M.D., F.A.C.C.  Formerly [facility]  [phone]

## 2021-06-17 NOTE — PROGRESS NOTES
Subjective: This patient comes in for follow-up patient was hospitalized April 9 over at [facility] for his cardiomyopathy.  He has biventricular failure he had a myocardial biopsy he will be following up with Dr. Freitas    He is now on losartan 50 mg in the morning 100 mg at night as well as hydralazine 100 mg 3 times daily, Imdur 60 mg a day, Coreg 6.25 mg twice daily and continues on amlodipine 5 mg a day    His energies been a little bit better he had actually been playing soccer recently.  About a week ago he was playing and got elbowed in the left posterior ribs he has had some ongoing pain it hurts when he coughs    Denies any increased shortness of breath denies any swelling.    He is here with his wife they also had a question regarding his hospital stay he did have evidence of MRSA positive findings from a nasal swab.  At that point they elected not to get treated they had questions as to why.    Patient does have a follow-up with his cardiologist, Dr. Freitas.  I am not sure if they plan to do an ICD etc. but I think it be worth asking him if he wants the patient treated if they are going to do some interventional treatment surrounding this.    Otherwise he is asymptomatic    Regarding his lungs and breathing he denies any increased shortness of breath he denies any hemoptysis.  No fever    Tobacco status: He  reports that he has been smoking.  He has never used smokeless tobacco.    Patient Active Problem List    Diagnosis Date Noted     MRSA colonization 05/01/2018     Rib pain on left side 05/01/2018     Nonischemic cardiomyopathy 03/07/2018     Smoker 11/02/2017     Needs MMR and Varicella vaccinations 12/27/2016     Cough      Joint Pain, Localized In The Wrist      Hypertension        Current Outpatient Prescriptions   Medication Sig Dispense Refill     amLODIPine (NORVASC) 2.5 MG tablet Take 5 mg by mouth at bedtime.       benzonatate (TESSALON) 100 MG capsule Take 200 mg by mouth 3  "(three) times a day.       carvedilol (COREG) 6.25 MG tablet Take 1 tablet by mouth 2 (two) times a day.       hydrALAZINE (APRESOLINE) 100 MG tablet Take 100 mg by mouth 3 (three) times a day.       isosorbide mononitrate (IMDUR) 60 MG 24 hr tablet Take 60 mg by mouth every morning.       losartan (COZAAR) 100 MG tablet Take 50mg in AM and Take 100 mg in PM.       albuterol (PROAIR HFA;PROVENTIL HFA;VENTOLIN HFA) 90 mcg/actuation inhaler Inhale 2 puffs every 4 (four) hours as needed. 18 g 1     blood pressure monitor Kit Please provide patient with an automatic blood pressure cuff to use as needed to check blood pressure.  Diagnosis: hypertension. 1 each 0     codeine-guaiFENesin (GUAIFENESIN AC)  mg/5 mL liquid Take 10 mL by mouth 3 (three) times a day as needed. 240 mL 0     fluticasone (FLONASE) 50 mcg/actuation nasal spray 1 spray into each nostril daily. 16 g 2     fluticasone (FLOVENT HFA) 110 mcg/actuation inhaler Inhale 2 puffs 2 (two) times a day. 1 Inhaler 2     furosemide (LASIX) 20 MG tablet Take 1 tablet (20 mg total) by mouth 2 (two) times a day at 9am and 6pm. 60 tablet 3     inhalational spacing device Spcr Use with inhaler as needed 1 each 0     No current facility-administered medications for this visit.        ROS: 10 point review of systems negative other than as outlined above    Objective:    /70 (Patient Site: Left Arm, Patient Position: Sitting, Cuff Size: Adult Large)  Pulse (!) 54  Temp 97.2  F (36.2  C) (Oral)   Resp 20  Ht 5' 3.5\" (1.613 m)  Wt 165 lb (74.8 kg)  SpO2 96% Comment: at rest with room air  BMI 28.77 kg/m2  Body mass index is 28.77 kg/(m^2).      General appearance no acute distress    Vital signs were stable O2 sat was good    Neck without JVD oropharynx is clear no scleral icterus    Lungs clear throughout no rales or rhonchi, good air exchange    Heart was regular rate at 60.  Chest, patient has tenderness in through the posterior left mid rib area no " step-off no redness no rash.  His abdomen was soft nontender no guarding rebound    Extremities without edema, skin was normal.    Chest x-ray.  Clear on PA and left rib detail no fractures.        Assessment:  1. Fracture of rib of left side     2. Nonischemic cardiomyopathy     3. Rib pain on left side  XR Ribs Left W PA Chest   4. MRSA colonization       Contusion no evidence of fracture monitor symptoms use Tylenol    Biventricular heart failure with cardiomyopathy, recent evaluation over at [facility] with myocardial biopsy    MRSA colonization please see above discussion and they will check with the cardiologist with a follow-up    No change in medicines as outlined    Plan: As discussed above we will contact if radiology reading warrants        Addendum: Radiologist reading did show a nondisplaced left rib fracture.  I did leave a message regarding this with the patient.  Told him to avoid contact sports to allow this to heal and.    Discussed this may take a few weeks to heal, follow-up if any worsening        This transcription uses voice recognition software, which may contain typographical errors.

## 2021-06-26 NOTE — PROGRESS NOTES
Progress Notes by Britney Donald MD at 5/11/2018  2:30 PM     Author: Britney Donald MD Service: -- Author Type: Physician    Filed: 5/20/2018  2:59 PM Encounter Date: 5/11/2018 Status: Signed    : Britney Donald MD (Physician)       ASSESSMENT/PLAN:  1. Abscess  lidocaine-EPINEPHrine (PF) 1 %-1:200,000 injection 1 mL    sulfamethoxazole-trimethoprim (SEPTRA DS) 800-160 mg per tablet    Culture, Wound    lidocaine 10 mg/mL (1 %) injection 2 mL    lidocaine-EPINEPHrine (PF) 1 %-1:200,000 injection 2 mL   2. MRSA colonization  lidocaine-EPINEPHrine (PF) 1 %-1:200,000 injection 1 mL    sulfamethoxazole-trimethoprim (SEPTRA DS) 800-160 mg per tablet    Culture, Wound    lidocaine-EPINEPHrine (PF) 1 %-1:200,000 injection 2 mL       This is a 36 yo male with h/o MRSA colonization noted on a recent hospitalization.  Now presents with abscess on posterior right shoulder. We discussed this today.  We discussed the meaning of this if it is MRSA as well.  For today, will I&D the abscess (see procedure note), culture the wound and treat with sulfa.  Follow up if worsens despite treatment.  Discussed wound care - including continued warm packing and encouraging draining.      Education provided regarding MRSA as well.           There are no discontinued medications.  Patient Instructions     Methicillin-Resistant Staphylococcus aureus (MRSA)  What is MRSA?  Staphylococcus aureus bacteria or staph are common germs. They are normally found on the skin or in the nose of many people. Usually, the bacteria cause no problem. Occasionally, they can cause mild skin infections. Or severe infections of the skin, lungs, blood, or other organs or tissues may develop. Some staph infections can be easily treated with antibiotics. But one type of staph infection, methicillin-resistant staphylococcus aureus (MRSA) cannot. It is called methicillin-resistant because the antibiotic, methicillin,  which used to be effective treatment, no longer works. MRSA is common in hospitals and nursing homes or long-term care facilities. It is also spreading among healthy children and adults outside the healthcare system. A person may be a carrier. Or he or she may have the infection.    Colonization. When a person carries the MRSA bacteria but is healthy, it's called being colonized. This person can spread MRSA to others but has no infection.    Infection. When a person gets sick because of the bacteria, it's called being infected with MRSA. This person can also spread MRSA to others. If not treated properly, MRSA infections can be very serious and even cause death.    What are the risk factors for MRSA?  Anyone can get MRSA, although there are factors that increase the risk. Some of these include:    Recent or lengthy hospital stay    Having a surgical wound or intravenous (IV) line    Having a weakened immune system due to a medical condition or its treatment    Living in a nursing home or long-term care facility    Recent antibiotic therapy    Diabetes    Kidney dialysis    HIV infection    Injection drug use or sharing needles    half-way or care home time    Living in any crowded facility, such as a dormitory     service    Sharing sports equipment, razors, or other sharp objects  How does MRSA spread?    People who are colonized with MRSA have MRSA in their noses or on their skin. Though they may not be sick themselves, they can spread the germs to others.    In hospitals and long-term care facilities, MRSA can spread from patient to patient on the hands of healthcare workers. It can also spread on objects, such as cart or door handles and bedrails.    Outside healthcare settings, MRSA usually spreads through skin-to-skin contact, shared towels or athletic equipment, or through close contact with an infected person.  What are the symptoms of MRSA infection?  MRSA skin infections start as small red bumps on the  skin that look like pimples or spider bites. The small bumps usually get larger and become swollen, painful, warm to the touch, and filled with pus. Fever may be present. MRSA can also start in other ways. And it can spread deeper into the body where it can cause one or more of the following:    Infections in bones, muscles, and other tissues    Pneumonia, an infection in one or both lungs    Infection of a surgical wound    Infection in the bloodstream (bacteremia)    Infection of the lining of the heart (endocarditis)    Infection of the urinary tract (bladder and kidneys)  How is MRSA diagnosed?  A sample of blood, urine, or infected tissue may be taken to diagnose a MRSA infection. A swab of the inside of the nose is taken to diagnose colonization. The sample is then sent to a laboratory and tested for MRSA. if the infection involves bone, joint, or other organs, a blood test may be done. Imaging studies, such as an X-ray or CT scan, may also be needed.  How is MRSA treated?  MRSA infections are usually treated with antibiotics. It may be given by mouth in pill form or into a vein (intravenous or IV). If a skin abscess is present, it may be drained.   Patients who test positive for MRSA colonization may undergo a process called decolonization. A topical antibiotic is applied inside the nose or in the nostrils to kill the bacteria. A special soap may be used to cleanse the skin.  Can MRSA be prevented?  Hospitals and nursing homes help prevent MRSA by doing the following:    Handwashing. This is the single most important way to prevent the spread of germs. Healthcare workers should wash their hands with soap and water or an alcohol-based hand  before and after treating each patient. They also should clean their hands after touching any surface that may be contaminated.    Protective clothing. Healthcare workers and visitors may wear gloves and a gown when entering the room of a patient with MRSA. They  remove these items before leaving.    Private rooms. Patients with MRSA infections are placed in private rooms or in a room with others who have the same infection.    Personal care items. Patients with MRSA may have their own patient care items, such as thermometers and stethoscopes.    Monitoring. Hospitals monitor the spread of MRSA and educate all staff on the best ways to prevent it.  Patients can help prevent MRSA by doing the following:    Ask all hospital staff to wash their hands before touching you. Dont be afraid to speak up!    Wash your own hands frequently with soap and water. Or use an alcohol-based hand gel.    Ask that stethoscopes and other instruments be wiped with alcohol before they are used on you.    Be sure youre tested for MRSA if you have a skin infection.  If you are taking care of someone with MRSA:    Wash your hands well with soap and water before and after any contact with the person.    Wear gloves when changing a bandage or touching an infected wound. Discard gloves after each use. Then wash your hands well.    Wash the patient's bed linens, towels, and clothing in hot water with detergent or liquid bleach.  Everyone can help prevent MRSA by doing the following:    Wash your hands often with warm water and soap.    Rub your hands together.    Clean the whole hand, under your nails, between your fingers, and up the wrists.    Wash for at least 15 to 20 seconds.    Rinse, letting the water run down your fingers, not up your wrists.    Dry your hands well. Use a paper towel or turn off the faucet and open the door.    If soap and water aren't available, use an alcohol-based hand .    Squeeze about a tablespoon of  into the palm of one hand.    Rub your hands together briskly, cleaning the backs of your hands, the palms, between your fingers, and up the wrists.    Rub until the  is gone and your hands are completely dry.    Keep cuts and scrapes clean and covered  until they heal.    Avoid contact with the wounds or bandages of others.    Avoid sharing towels, razors, clothing, and athletic equipment.  Date Last Reviewed: 10/1/2016    9870-8924 [employer]. [address] [place] [address], [place] [address]. All rights reserved. This information is not intended as a substitute for professional medical care. Always follow your healthcare professional's instructions.            Chief Complaint:  Chief Complaint   Patient presents with   ? Recurrent Skin Infections     right shoulder       HPI:   Palomo Patterson is a 37 y.o. male c/o  Was hospitalized for CHF  They swabbed his nares - positive for MRSA  Now has developed painful abscess on posterior right shoulder    PMH:   Patient Active Problem List    Diagnosis Date Noted   ? Abscess 05/11/2018   ? MRSA colonization 05/01/2018   ? Rib pain on left side 05/01/2018   ? Nonischemic cardiomyopathy 03/07/2018   ? Smoker 11/02/2017   ? Needs MMR and Varicella vaccinations 12/27/2016   ? Cough    ? Joint Pain, Localized In The Wrist    ? Hypertension      History reviewed. No pertinent past medical history.  Past Surgical History:   Procedure Laterality Date   ? APPENDECTOMY      open due to abscess     Social History     Social History   ? Marital status: Single     Spouse name: N/A   ? Number of children: N/A   ? Years of education: N/A     Occupational History   ? Not on file.     Social History Main Topics   ? Smoking status: Current Every Day Smoker   ? Smokeless tobacco: Never Used      Comment: 3-4 cigarettes a day    ? Alcohol use No   ? Drug use: Yes     Special: Methamphetamines      Comment: 3/7/18 Last use was one week ago.    ? Sexual activity: Not on file     Other Topics Concern   ? Not on file     Social History Narrative       Meds:    Current Outpatient Prescriptions:   ?  albuterol (PROAIR HFA;PROVENTIL HFA;VENTOLIN HFA) 90 mcg/actuation inhaler, Inhale 2 puffs every 4 (four) hours as needed., Disp: 18 g, Rfl:  1  ?  amLODIPine (NORVASC) 2.5 MG tablet, Take 5 mg by mouth at bedtime., Disp: , Rfl:   ?  blood pressure monitor Kit, Please provide patient with an automatic blood pressure cuff to use as needed to check blood pressure.  Diagnosis: hypertension., Disp: 1 each, Rfl: 0  ?  carvedilol (COREG) 6.25 MG tablet, Take 1 tablet by mouth 2 (two) times a day., Disp: , Rfl:   ?  fluticasone (FLONASE) 50 mcg/actuation nasal spray, 1 spray into each nostril daily., Disp: 16 g, Rfl: 2  ?  fluticasone (FLOVENT HFA) 110 mcg/actuation inhaler, Inhale 2 puffs 2 (two) times a day., Disp: 1 Inhaler, Rfl: 2  ?  hydrALAZINE (APRESOLINE) 100 MG tablet, Take 100 mg by mouth 3 (three) times a day., Disp: , Rfl:   ?  inhalational spacing device Spcr, Use with inhaler as needed, Disp: 1 each, Rfl: 0  ?  isosorbide mononitrate (IMDUR) 60 MG 24 hr tablet, Take 60 mg by mouth every morning., Disp: , Rfl:   ?  losartan (COZAAR) 100 MG tablet, Take 50mg in AM and Take 100 mg in PM., Disp: , Rfl:   ?  benzonatate (TESSALON) 100 MG capsule, Take 200 mg by mouth 3 (three) times a day., Disp: , Rfl:   ?  codeine-guaiFENesin (GUAIFENESIN AC)  mg/5 mL liquid, Take 10 mL by mouth 3 (three) times a day as needed., Disp: 240 mL, Rfl: 0  ?  furosemide (LASIX) 20 MG tablet, Take 1 tablet (20 mg total) by mouth 2 (two) times a day at 9am and 6pm., Disp: 60 tablet, Rfl: 3  ?  sulfamethoxazole-trimethoprim (SEPTRA DS) 800-160 mg per tablet, Take 1 tablet by mouth 2 (two) times a day for 10 days., Disp: 20 tablet, Rfl: 0    Current Facility-Administered Medications:   ?  lidocaine 10 mg/mL (1 %) injection 2 mL, 2 mL, Intradermal, Once, Britney Donald MD  ?  lidocaine-EPINEPHrine (PF) 1 %-1:200,000 injection 1 mL, 1 mL, Other, Once, Britney Donald MD  ?  lidocaine-EPINEPHrine (PF) 1 %-1:200,000 injection 2 mL, 2 mL, Intradermal, Once, Britney Donald MD    Allergies:  Allergies   Allergen Reactions   ? Atorvastatin  Cough   ? Lisinopril        ROS:  Pertinent positives as noted in HPI; otherwise 12 point ROS negative.      Physical Exam:  EXAM:  /80 (Patient Site: Right Arm, Patient Position: Sitting, Cuff Size: Adult Large)  Pulse 98  Temp 97.4  F (36.3  C) (Oral)   Resp 20  Wt 171 lb (77.6 kg)  BMI 29.82 kg/m2   Gen:  NAD, appears well, well-hydrated  HEENT:  TMs nl, oropharynx benign, nasal mucosa nl, conjunctiva clear  Neck:  Supple, no adenopathy, no thyromegaly, no carotid bruits, no JVD  Lungs:  Clear to auscultation bilaterally  Cor:  RRR no murmur  Abd:  Soft, nontender, BS+, no masses, no guarding or rebound, no HSM  Extr:  Neg.  Neuro:  No asymmetry  Skin:  Warm/dry, large fluctuant abscess with surrounding erythema right posterior suprascapular region (1.5 cm) - this was drained today without complication - culture obtained.    Procedure - I&D of Abscess  Indication:  H/o MRSA colonization, large infected abscess  Location :  Right posterior suprascapular region - measuring 1.5 cm - erythema surrounding  Consent:  Verbal  Procedure:  Area overlying fluctuance was swabbed x 3 with Betadine; 1% Lidocaine with epi (total 2 ml) was injected locally in wound.  Then, #11 blade used to make stab incision.  Immediate return of purulent fluid was obtained.  This was swabbed for culture.  Pressure was maintained to drain wound well.  Wound was then dressed and wound care instructions given to patient/family member.    Specimen:  Wound culture        Results:  Results for orders placed or performed in visit on 05/11/18   Culture, Wound   Result Value Ref Range    Culture 4+ Staphylococcus aureus (!)        Susceptibility    Staphylococcus aureus - LOGAN     Clindamycin >2 Resistant      Cefazolin <=2 Sensitive      Doxycycline 1 Sensitive      Erythromycin >4 Resistant      Levofloxacin <=0.5 Sensitive      Oxacillin 0.5 Sensitive      Trimethoprim + Sulfamethoxazole <=1/19 Sensitive      Vancomycin 1 Sensitive

## 2021-07-03 NOTE — ADDENDUM NOTE
Addendum Note by Demetrio Pereira MD at 3/6/2018  3:36 PM     Author: Demetrio Pereira MD Service: -- Author Type: Physician    Filed: 3/6/2018  3:36 PM Encounter Date: 2/27/2018 Status: Signed    : Demetrio Pereira MD (Physician)    Addended by: DEMETRIO PEREIRA on: 3/6/2018 03:36 PM        Modules accepted: Orders

## 2021-09-19 ENCOUNTER — HEALTH MAINTENANCE LETTER (OUTPATIENT)
Age: 41
End: 2021-09-19

## 2022-01-09 ENCOUNTER — HEALTH MAINTENANCE LETTER (OUTPATIENT)
Age: 42
End: 2022-01-09

## 2022-11-20 ENCOUNTER — HEALTH MAINTENANCE LETTER (OUTPATIENT)
Age: 42
End: 2022-11-20

## 2023-01-06 ENCOUNTER — PRE VISIT (OUTPATIENT)
Dept: CARDIOLOGY | Facility: CLINIC | Age: 43
End: 2023-01-06

## 2023-01-06 DIAGNOSIS — I50.82 BIVENTRICULAR HEART FAILURE (H): Primary | ICD-10-CM

## 2023-01-24 NOTE — PROGRESS NOTES
.    José Antonio Murillo MD   [address] [address]   FAX:  [phone]         Demetrio Parker MD    [address] [place] [address] [id number] [phone] (Fax)        RE:      Palomo FAULKNER Leonardo Murillo MD   [address] [address]   FAX:  [phone]                MRN:   [id number]   :   1980       IMPRESSION:   1.  Biventricular acute on chronic congestive heart failure.   2.  History of methamphetamine use (recurrent).   A.  History of multiple positive urine tests.   3.  History of smoking.   4.  Family history of atherosclerotic cardiovascular disease.   5.  Family history of sudden death.   6.  History of appendiceal rupture.  7.  CKD 3A  8.  Recent COVID disease    The patient returns for follow-up of heart failure.  There is no interim history of chest pain, tightness, paroxysmal nocturnal dyspnea, orthopnea, peripheral edema, palpitation, pre-syncope, syncope, device discharge.  Exercise tolerance is stable.  The patient is attempting to exercise regularly and following a sodium restricted, calorically appropriate diet.  Medications are reviewed and the patient is taking medications as prescribed.  The patient is generally sleeping well. The patient was recently released from a correctional program and lives in sober house.  He has been clean for several years.  His echocardiogram demonstrates marked improvement in EF to 45-50 by my eye.  He is running daily up to 4 miles and has a 7 minute mile!    Constitutional: weight loss, fever, chills, night sweats  HEENT: without visual changes, swallow difficulties  Pulmonary: without shortness of breath, cough, wheeze, hemoptysis  Cardiac: without chest pain, NEWMAN, PND, orthopnea, edema, palpitation, pre-syncope, syncope,  GI: without diarrhea, constipation, jaundice, melena, GERD, hematemesis  : without frequency, urgency, dysuria, hematuria  Skin:  rash, bruise, open lesions  Neuro: without TIA, focal neurologic complaints, seizure, trauma  Ortho: without pain, swelling, mobility impairment  Endocrine: diabetes, thyroid, heat/cold intolerance, polyuria, polyphagia, change bowel habits.  Sleep:no ZACHARY, periodic breathing, fatigue  Other:    Plan:  1. RTC 6 weeks and 4 months  2. Encourage continuing abstinence  3. Renew medications  4. May exercise      Constitutional: alert, oriented, normal gait and station, normal mentation.  Oral: moist mucous membrans  Lymph: without pathologic adenopathy  Chest: clear to ausculation and percussion  Cor: No evidence of left or right ventricular activity.  Rhythm is regular.  S1 normal, S2 split physiologically. Murmurs are not present  Abdomen: without tenderness, rebound, guarding, masses, ascites  Extremities: Edema not present  Neuro: no focal defects, normal mentation  Skin: without open lesions  Psych: oriented, verbal, mental status in tact    Wt Readings from Last 24 Encounters:   01/25/23 84.8 kg (187 lb)   12/06/18 85.1 kg (187 lb 9.6 oz)   09/18/18 79.4 kg (175 lb)   08/14/18 78.5 kg (173 lb)   07/11/18 79.4 kg (175 lb 1.6 oz)   06/21/18 78.9 kg (173 lb 14.4 oz)   05/30/18 77 kg (169 lb 12.8 oz)   05/11/18 77.6 kg (171 lb)   05/08/18 77.9 kg (171 lb 11.2 oz)   05/01/18 74.8 kg (165 lb)   04/15/18 73.3 kg (161 lb 9.6 oz)   04/05/18 78.6 kg (173 lb 3.2 oz)   03/07/18 81.2 kg (179 lb)   02/27/18 79.8 kg (176 lb)   02/18/18 83.9 kg (185 lb)   01/02/18 82.1 kg (181 lb)   11/01/17 78.5 kg (173 lb)   12/23/16 77.8 kg (171 lb 8 oz)   07/14/16 79.8 kg (176 lb)   06/30/16 78.5 kg (173 lb)   .   Latest Reference Range & Units Most Recent   Sodium 136 - 145 mmol/L 143  1/25/23 15:28   Potassium 3.4 - 5.3 mmol/L 3.9  1/25/23 15:28   Chloride 98 - 107 mmol/L 110 (H)  1/25/23 15:28   Carbon Dioxide (CO2) 22 - 29 mmol/L 21 (L)  1/25/23 15:28   Urea Nitrogen 6.0 - 20.0 mg/dL 17.4  1/25/23 15:28   Creatinine 0.67 - 1.17 mg/dL 1.24  (H)  1/25/23 15:28   GFR Estimate >60 mL/min/1.73m2 75  1/25/23 15:28   Calcium 8.6 - 10.0 mg/dL 9.0  1/25/23 15:28   Anion Gap 7 - 15 mmol/L 12  1/25/23 15:28   Albumin 3.5 - 5.2 g/dL 4.4  1/25/23 15:28   Protein Total 6.4 - 8.3 g/dL 6.9  1/25/23 15:28   Alkaline Phosphatase 40 - 129 U/L 66  1/25/23 15:28   ALT 10 - 50 U/L 27  1/25/23 15:28   AST 10 - 50 U/L 24  1/25/23 15:28   Bilirubin Total <=1.2 mg/dL 0.5  1/25/23 15:28   Glucose 70 - 99 mg/dL 97  1/25/23 15:28   N-Terminal Pro Bnp 0 - 450 pg/mL <36  1/25/23 15:28   WBC 4.0 - 11.0 10e3/uL 6.5  1/25/23 15:28   Hemoglobin 13.3 - 17.7 g/dL 14.0  1/25/23 15:28   Hematocrit 40.0 - 53.0 % 43.0  1/25/23 15:28   Platelet Count 150 - 450 10e3/uL 196  1/25/23 15:28   RBC Count 4.40 - 5.90 10e6/uL 4.89  1/25/23 15:28   MCV 78 - 100 fL 88  1/25/23 15:28   MCH 26.5 - 33.0 pg 28.6  1/25/23 15:28   MCHC 31.5 - 36.5 g/dL 32.6  1/25/23 15:28   RDW 10.0 - 15.0 % 18.6 (H)  1/25/23 15:28   ECHOCARDIOGRAM COMPLETE  Rpt  1/25/23 16:05     Rpt: View report in Results Review for more informationHis has been there since early 2020.  His interim history is somewhat vague but denies palpitation, pre-syncope, syncope, PND, orthopnea, ankle edema, major weight changes.     Constitutional:without weight loss, fever, chills, night sweats  HEENT: without visual changes, swallow difficulties but loss of smellwith  COVID  Pulmonary: without shortness of breath, cough, wheeze, hemoptysis  Cardiac: without chest pain, NEWMAN, PND, orthopnea, edema, palpitation, pre-syncope, syncope,  GI: without diarrhea, constipation, jaundice, melena, GERD, hematemesis  : without frequency, urgency, dysuria, hematuria  Skin: rash, bruise, open lesions  Neuro: without TIA, focal neurologic complaints, seizure, trauma  Ortho: without pain, swelling, mobility impairment  Endocrine: diabetes, thyroid, heat/cold intolerance, polyuria, polyphagia, change bowel habits.  Sleep:no ZACHARY, periodic breathing,  fatigue  Other:remote history amphetamine     Current Outpatient Medications   Medication     amLODIPine (NORVASC) 2.5 MG tablet     benzonatate (TESSALON) 100 MG capsule     carvedilol (COREG) 12.5 MG tablet     carvedilol (COREG) 6.25 MG tablet     furosemide (LASIX) 20 MG tablet     hydrALAZINE (APRESOLINE) 100 MG tablet     isosorbide mononitrate (IMDUR) 60 MG 24 hr tablet     lidocaine-EPINEPHrine 1 %-1:259254 SOLN injection     losartan (COZAAR) 100 MG tablet     losartan (COZAAR) 50 MG tablet     No current facility-administered medications for this visit.           Medications are reviewed an consist of Isordil, hydralazine, carvedilol, losartan and furosemide.      I contacted the snf and spoke with [facility] staff to review their chart.  His estimated GFR has improved to 66, potassium 4.0.  He has been hypertensive.  COVID smear + in past.                  Name: JAVI CASTELLANO  MRN: [id number]  : 1980  Study Date: 2018 03:10 PM  Age: 38 yrs  Gender: Male  [place] [facility] [place]: Chronic systolic congestive heart failure (H)  Ordering Physician: ENEDINA CHIRINOS  Referring Physician: ENEDINA CHIRINOS  Performed By: Oscar Rothman RDCS     BSA: 1.8 m2  Height: 63 in  Weight: 173 lb  _____________________________________________________________________________  __        Procedure  Echocardiogram with two-dimensional, color and spectral Doppler performed.  _____________________________________________________________________________  __        Interpretation Summary  Severe left ventricular dilation is present. Severely (EF 20%) reduced left  ventricular function is present. Severe diffuse hypokinesis is present.  Global right ventricular function is mildly reduced.  Mild mitral insufficiency is present.  The inferior vena cava was normal in size with preserved respiratory  variability.  No pericardial effusion is  present.  _____________________________________________________________________________  __        Left Ventricle  Severe left ventricular dilation is present. Severely (EF 20%) reduced left  ventricular function is present. Grade II or moderate diastolic dysfunction.  Severe diffuse hypokinesis is present. There is no thrombus.     Right Ventricle  The right ventricle is normal size. Global right ventricular function is  mildly reduced.     Atria  Both atria appear normal.        Mitral Valve  Mild mitral insufficiency is present.     Aortic Valve  Aortic valve is normal in structure and function.     Tricuspid Valve  Trace tricuspid insufficiency is present. The peak velocity of the tricuspid  regurgitant jet is not obtainable. Pulmonary artery systolic pressure cannot  be assessed.     Pulmonic Valve  Trace pulmonic insufficiency is present.     Vessels  The aorta root is normal. The inferior vena cava was normal in size with  preserved respiratory variability.     Pericardium  No pericardial effusion is present.        Compared to Previous Study  This study was compared with the study from 18, LV function has slightly  improved .  _____________________________________________________________________________  __     MMode/2D Measurements & Calculations  IVSd: 0.85 cm  LVIDd: 5.8 cm  LVIDs: 5.3 cm  LVPWd: 0.93 cm  FS: 9.9 %  LV mass(C)d: 203.6 grams  LV mass(C)dI: 112.0 grams/m2  Ao root diam: 3.3 cm  asc Aorta Diam: 3.2 cm  LVOT diam: 2.4 cm  LVOT area: 4.4 cm2  LA Volume (BP): 55.8 ml  LA Volume Index (BP): 30.7 ml/m2     RWT: 0.32        Doppler Measurements & Calculations  MV E max tad: 82.9 cm/sec  MV A max tad: 91.7 cm/sec  MV E/A: 0.90  MV dec slope: 871.8 cm/sec2  MV dec time: 0.10 sec  PA acc time: 0.09 sec  E/E' av.7  Lateral E/e': 17.0  Medial E/e': 20.4     _____________________________________________________________________________  __

## 2023-01-25 ENCOUNTER — ANCILLARY PROCEDURE (OUTPATIENT)
Dept: CARDIOLOGY | Facility: CLINIC | Age: 43
End: 2023-01-25
Attending: INTERNAL MEDICINE
Payer: MEDICAID

## 2023-01-25 ENCOUNTER — LAB (OUTPATIENT)
Dept: LAB | Facility: CLINIC | Age: 43
End: 2023-01-25
Payer: MEDICAID

## 2023-01-25 VITALS
SYSTOLIC BLOOD PRESSURE: 129 MMHG | DIASTOLIC BLOOD PRESSURE: 88 MMHG | OXYGEN SATURATION: 96 % | HEART RATE: 100 BPM | WEIGHT: 187 LBS | BODY MASS INDEX: 33.13 KG/M2

## 2023-01-25 DIAGNOSIS — I50.82 BIVENTRICULAR HEART FAILURE (H): Primary | ICD-10-CM

## 2023-01-25 DIAGNOSIS — I50.82 BIVENTRICULAR HEART FAILURE (H): ICD-10-CM

## 2023-01-25 DIAGNOSIS — I50.43 ACUTE ON CHRONIC COMBINED SYSTOLIC AND DIASTOLIC HEART FAILURE (H): ICD-10-CM

## 2023-01-25 DIAGNOSIS — I10 BENIGN ESSENTIAL HYPERTENSION: ICD-10-CM

## 2023-01-25 DIAGNOSIS — I50.43 ACUTE ON CHRONIC COMBINED SYSTOLIC AND DIASTOLIC CONGESTIVE HEART FAILURE (H): ICD-10-CM

## 2023-01-25 LAB
ALBUMIN SERPL BCG-MCNC: 4.4 G/DL (ref 3.5–5.2)
ALP SERPL-CCNC: 66 U/L (ref 40–129)
ALT SERPL W P-5'-P-CCNC: 27 U/L (ref 10–50)
ANION GAP SERPL CALCULATED.3IONS-SCNC: 12 MMOL/L (ref 7–15)
AST SERPL W P-5'-P-CCNC: 24 U/L (ref 10–50)
BILIRUB SERPL-MCNC: 0.5 MG/DL
BUN SERPL-MCNC: 17.4 MG/DL (ref 6–20)
CALCIUM SERPL-MCNC: 9 MG/DL (ref 8.6–10)
CHLORIDE SERPL-SCNC: 110 MMOL/L (ref 98–107)
CREAT SERPL-MCNC: 1.24 MG/DL (ref 0.67–1.17)
DEPRECATED HCO3 PLAS-SCNC: 21 MMOL/L (ref 22–29)
ERYTHROCYTE [DISTWIDTH] IN BLOOD BY AUTOMATED COUNT: 18.6 % (ref 10–15)
GFR SERPL CREATININE-BSD FRML MDRD: 75 ML/MIN/1.73M2
GLUCOSE SERPL-MCNC: 97 MG/DL (ref 70–99)
HCT VFR BLD AUTO: 43 % (ref 40–53)
HGB BLD-MCNC: 14 G/DL (ref 13.3–17.7)
LVEF ECHO: NORMAL
MCH RBC QN AUTO: 28.6 PG (ref 26.5–33)
MCHC RBC AUTO-ENTMCNC: 32.6 G/DL (ref 31.5–36.5)
MCV RBC AUTO: 88 FL (ref 78–100)
NT-PROBNP SERPL-MCNC: <36 PG/ML (ref 0–450)
PLATELET # BLD AUTO: 196 10E3/UL (ref 150–450)
POTASSIUM SERPL-SCNC: 3.9 MMOL/L (ref 3.4–5.3)
PROT SERPL-MCNC: 6.9 G/DL (ref 6.4–8.3)
RBC # BLD AUTO: 4.89 10E6/UL (ref 4.4–5.9)
SODIUM SERPL-SCNC: 143 MMOL/L (ref 136–145)
WBC # BLD AUTO: 6.5 10E3/UL (ref 4–11)

## 2023-01-25 PROCEDURE — 36415 COLL VENOUS BLD VENIPUNCTURE: CPT | Performed by: PATHOLOGY

## 2023-01-25 PROCEDURE — G0463 HOSPITAL OUTPT CLINIC VISIT: HCPCS | Performed by: INTERNAL MEDICINE

## 2023-01-25 PROCEDURE — G0463 HOSPITAL OUTPT CLINIC VISIT: HCPCS

## 2023-01-25 PROCEDURE — 85027 COMPLETE CBC AUTOMATED: CPT | Performed by: PATHOLOGY

## 2023-01-25 PROCEDURE — 99214 OFFICE O/P EST MOD 30 MIN: CPT | Mod: 25 | Performed by: INTERNAL MEDICINE

## 2023-01-25 PROCEDURE — 93306 TTE W/DOPPLER COMPLETE: CPT | Performed by: INTERNAL MEDICINE

## 2023-01-25 PROCEDURE — 80053 COMPREHEN METABOLIC PANEL: CPT | Performed by: PATHOLOGY

## 2023-01-25 PROCEDURE — 83880 ASSAY OF NATRIURETIC PEPTIDE: CPT | Performed by: PATHOLOGY

## 2023-01-25 RX ORDER — FUROSEMIDE 20 MG
TABLET ORAL
Qty: 30 TABLET | Refills: 1 | Status: SHIPPED | OUTPATIENT
Start: 2023-01-25 | End: 2023-01-27

## 2023-01-25 RX ORDER — HYDRALAZINE HYDROCHLORIDE 100 MG/1
100 TABLET, FILM COATED ORAL 3 TIMES DAILY
Qty: 270 TABLET | Refills: 3 | Status: SHIPPED | OUTPATIENT
Start: 2023-01-25

## 2023-01-25 RX ORDER — ISOSORBIDE MONONITRATE 60 MG/1
60 TABLET, EXTENDED RELEASE ORAL EVERY MORNING
Qty: 90 TABLET | Refills: 1 | Status: SHIPPED | OUTPATIENT
Start: 2023-01-25 | End: 2023-08-01

## 2023-01-25 RX ORDER — CARVEDILOL 12.5 MG/1
12.5 TABLET ORAL 2 TIMES DAILY WITH MEALS
Qty: 180 TABLET | Refills: 1 | Status: SHIPPED | OUTPATIENT
Start: 2023-01-25 | End: 2023-08-03

## 2023-01-25 RX ORDER — AMLODIPINE BESYLATE 2.5 MG/1
2.5 TABLET ORAL DAILY
Qty: 90 TABLET | Refills: 1 | Status: SHIPPED | OUTPATIENT
Start: 2023-01-25 | End: 2023-08-03

## 2023-01-25 ASSESSMENT — PAIN SCALES - GENERAL: PAINLEVEL: NO PAIN (0)

## 2023-01-25 NOTE — LETTER
Date:January 26, 2023      Provider requested that no letter be sent. Do not send.       [facility]

## 2023-01-25 NOTE — NURSING NOTE
Chief Complaint   Patient presents with     Follow Up     1/25/23- Reason for Visit: Return HF; 42yr old male with a h/o biventricular HF presenting for heart failure follow-up with labs and echo prior.      Vitals were taken and medications reconciled.    Chapito Arboleda, EMT  4:18 PM

## 2023-01-25 NOTE — LETTER
2023      RE: Palomo Patterson  [address]  [place] [address]       Dear Colleague,    Thank you for the opportunity to participate in the care of your patient, Palomo Patterson, at the [facility]. Please see a copy of my visit note below.      .    José Antonio Murillo MD   [address] [address]   FAX:  [phone]         Demetrio Parker MD    [address] [place] [address] [phone] (Fax)        RE:      Palomo FAULKNER Leonardo         José Antonio Murillo MD   [address] [address]   FAX:  [phone]                MRN:   [id number]   :   1980       IMPRESSION:   1.  Biventricular acute on chronic congestive heart failure.   2.  History of methamphetamine use (recurrent).   A.  History of multiple positive urine tests.   3.  History of smoking.   4.  Family history of atherosclerotic cardiovascular disease.   5.  Family history of sudden death.   6.  History of appendiceal rupture.  7.  CKD 3A  8.  Recent COVID disease    The patient returns for follow-up of heart failure.  There is no interim history of chest pain, tightness, paroxysmal nocturnal dyspnea, orthopnea, peripheral edema, palpitation, pre-syncope, syncope, device discharge.  Exercise tolerance is stable.  The patient is attempting to exercise regularly and following a sodium restricted, calorically appropriate diet.  Medications are reviewed and the patient is taking medications as prescribed.  The patient is generally sleeping well. The patient was recently released from a correctional program and lives in sober house.  He has been clean for several years.  His echocardiogram demonstrates marked improvement in EF to 45-50 by my eye.  He is running daily up to 4 miles and has a 7 minute mile!    Constitutional: weight loss, fever, chills, night sweats  HEENT: without  visual changes, swallow difficulties  Pulmonary: without shortness of breath, cough, wheeze, hemoptysis  Cardiac: without chest pain, NEWMAN, PND, orthopnea, edema, palpitation, pre-syncope, syncope,  GI: without diarrhea, constipation, jaundice, melena, GERD, hematemesis  : without frequency, urgency, dysuria, hematuria  Skin: rash, bruise, open lesions  Neuro: without TIA, focal neurologic complaints, seizure, trauma  Ortho: without pain, swelling, mobility impairment  Endocrine: diabetes, thyroid, heat/cold intolerance, polyuria, polyphagia, change bowel habits.  Sleep:no ZACHARY, periodic breathing, fatigue  Other:    Plan:  1. RTC 6 weeks and 4 months  2. Encourage continuing abstinence  3. Renew medications  4. May exercise      Constitutional: alert, oriented, normal gait and station, normal mentation.  Oral: moist mucous membrans  Lymph: without pathologic adenopathy  Chest: clear to ausculation and percussion  Cor: No evidence of left or right ventricular activity.  Rhythm is regular.  S1 normal, S2 split physiologically. Murmurs are not present  Abdomen: without tenderness, rebound, guarding, masses, ascites  Extremities: Edema not present  Neuro: no focal defects, normal mentation  Skin: without open lesions  Psych: oriented, verbal, mental status in tact    Wt Readings from Last 24 Encounters:   01/25/23 84.8 kg (187 lb)   12/06/18 85.1 kg (187 lb 9.6 oz)   09/18/18 79.4 kg (175 lb)   08/14/18 78.5 kg (173 lb)   07/11/18 79.4 kg (175 lb 1.6 oz)   06/21/18 78.9 kg (173 lb 14.4 oz)   05/30/18 77 kg (169 lb 12.8 oz)   05/11/18 77.6 kg (171 lb)   05/08/18 77.9 kg (171 lb 11.2 oz)   05/01/18 74.8 kg (165 lb)   04/15/18 73.3 kg (161 lb 9.6 oz)   04/05/18 78.6 kg (173 lb 3.2 oz)   03/07/18 81.2 kg (179 lb)   02/27/18 79.8 kg (176 lb)   02/18/18 83.9 kg (185 lb)   01/02/18 82.1 kg (181 lb)   11/01/17 78.5 kg (173 lb)   12/23/16 77.8 kg (171 lb 8 oz)   07/14/16 79.8 kg (176 lb)   06/30/16 78.5 kg (173 lb)   .   Latest  Reference Range & Units Most Recent   Sodium 136 - 145 mmol/L 143  1/25/23 15:28   Potassium 3.4 - 5.3 mmol/L 3.9  1/25/23 15:28   Chloride 98 - 107 mmol/L 110 (H)  1/25/23 15:28   Carbon Dioxide (CO2) 22 - 29 mmol/L 21 (L)  1/25/23 15:28   Urea Nitrogen 6.0 - 20.0 mg/dL 17.4  1/25/23 15:28   Creatinine 0.67 - 1.17 mg/dL 1.24 (H)  1/25/23 15:28   GFR Estimate >60 mL/min/1.73m2 75  1/25/23 15:28   Calcium 8.6 - 10.0 mg/dL 9.0  1/25/23 15:28   Anion Gap 7 - 15 mmol/L 12  1/25/23 15:28   Albumin 3.5 - 5.2 g/dL 4.4  1/25/23 15:28   Protein Total 6.4 - 8.3 g/dL 6.9  1/25/23 15:28   Alkaline Phosphatase 40 - 129 U/L 66  1/25/23 15:28   ALT 10 - 50 U/L 27  1/25/23 15:28   AST 10 - 50 U/L 24  1/25/23 15:28   Bilirubin Total <=1.2 mg/dL 0.5  1/25/23 15:28   Glucose 70 - 99 mg/dL 97  1/25/23 15:28   N-Terminal Pro Bnp 0 - 450 pg/mL <36  1/25/23 15:28   WBC 4.0 - 11.0 10e3/uL 6.5  1/25/23 15:28   Hemoglobin 13.3 - 17.7 g/dL 14.0  1/25/23 15:28   Hematocrit 40.0 - 53.0 % 43.0  1/25/23 15:28   Platelet Count 150 - 450 10e3/uL 196  1/25/23 15:28   RBC Count 4.40 - 5.90 10e6/uL 4.89  1/25/23 15:28   MCV 78 - 100 fL 88  1/25/23 15:28   MCH 26.5 - 33.0 pg 28.6  1/25/23 15:28   MCHC 31.5 - 36.5 g/dL 32.6  1/25/23 15:28   RDW 10.0 - 15.0 % 18.6 (H)  1/25/23 15:28   ECHOCARDIOGRAM COMPLETE  Rpt  1/25/23 16:05     Rpt: View report in Results Review for more informationHis has been there since early 2020.  His interim history is somewhat vague but denies palpitation, pre-syncope, syncope, PND, orthopnea, ankle edema, major weight changes.     Constitutional:without weight loss, fever, chills, night sweats  HEENT: without visual changes, swallow difficulties but loss of smellwith  COVID  Pulmonary: without shortness of breath, cough, wheeze, hemoptysis  Cardiac: without chest pain, NEWMAN, PND, orthopnea, edema, palpitation, pre-syncope, syncope,  GI: without diarrhea, constipation, jaundice, melena, GERD, hematemesis  : without frequency,  urgency, dysuria, hematuria  Skin: rash, bruise, open lesions  Neuro: without TIA, focal neurologic complaints, seizure, trauma  Ortho: without pain, swelling, mobility impairment  Endocrine: diabetes, thyroid, heat/cold intolerance, polyuria, polyphagia, change bowel habits.  Sleep:no ZACHARY, periodic breathing, fatigue  Other:remote history amphetamine     Current Outpatient Medications   Medication     amLODIPine (NORVASC) 2.5 MG tablet     benzonatate (TESSALON) 100 MG capsule     carvedilol (COREG) 12.5 MG tablet     carvedilol (COREG) 6.25 MG tablet     furosemide (LASIX) 20 MG tablet     hydrALAZINE (APRESOLINE) 100 MG tablet     isosorbide mononitrate (IMDUR) 60 MG 24 hr tablet     lidocaine-EPINEPHrine 1 %-1:332223 SOLN injection     losartan (COZAAR) 100 MG tablet     losartan (COZAAR) 50 MG tablet     No current facility-administered medications for this visit.           Medications are reviewed an consist of Isordil, hydralazine, carvedilol, losartan and furosemide.      [facility] and [facility] with [facility] staff to review their chart.  His estimated GFR has improved to 66, potassium 4.0.  He has been hypertensive.  COVID smear + in past.                  Name: JAVI CASTELLANO  MRN: [id number]  : 1980  Study Date: 2018 03:10 PM  Age: 38 yrs  Gender: Male  Patient Location: [facility]  Reason For Study: Chronic systolic congestive heart failure (H)  Ordering Physician: ENEDINA CHIRINOS  Referring Physician: ENEDINA CHIRINOS  Performed By: Oscar Rothman RDCS     BSA: 1.8 m2  Height: 63 in  Weight: 173 lb  _____________________________________________________________________________  __        Procedure  Echocardiogram with two-dimensional, color and spectral Doppler performed.  _____________________________________________________________________________  __        Interpretation Summary  Severe left ventricular dilation is present. Severely (EF 20%) reduced left  ventricular function is  present. Severe diffuse hypokinesis is present.  Global right ventricular function is mildly reduced.  Mild mitral insufficiency is present.  The inferior vena cava was normal in size with preserved respiratory  variability.  No pericardial effusion is present.  _____________________________________________________________________________  __        Left Ventricle  Severe left ventricular dilation is present. Severely (EF 20%) reduced left  ventricular function is present. Grade II or moderate diastolic dysfunction.  Severe diffuse hypokinesis is present. There is no thrombus.     Right Ventricle  The right ventricle is normal size. Global right ventricular function is  mildly reduced.     Atria  Both atria appear normal.        Mitral Valve  Mild mitral insufficiency is present.     Aortic Valve  Aortic valve is normal in structure and function.     Tricuspid Valve  Trace tricuspid insufficiency is present. The peak velocity of the tricuspid  regurgitant jet is not obtainable. Pulmonary artery systolic pressure cannot  be assessed.     Pulmonic Valve  Trace pulmonic insufficiency is present.     Vessels  The aorta root is normal. The inferior vena cava was normal in size with  preserved respiratory variability.     Pericardium  No pericardial effusion is present.        Compared to Previous Study  This study was compared with the study from 4.9.18, LV function has slightly  improved .  _____________________________________________________________________________  __     MMode/2D Measurements & Calculations  IVSd: 0.85 cm  LVIDd: 5.8 cm  LVIDs: 5.3 cm  LVPWd: 0.93 cm  FS: 9.9 %  LV mass(C)d: 203.6 grams  LV mass(C)dI: 112.0 grams/m2  Ao root diam: 3.3 cm  asc Aorta Diam: 3.2 cm  LVOT diam: 2.4 cm  LVOT area: 4.4 cm2  LA Volume (BP): 55.8 ml  LA Volume Index (BP): 30.7 ml/m2     RWT: 0.32        Doppler Measurements & Calculations  MV E max tad: 82.9 cm/sec  MV A max tad: 91.7 cm/sec  MV E/A: 0.90  MV dec slope:  871.8 cm/sec2  MV dec time: 0.10 sec  PA acc time: 0.09 sec  E/E' av.7  Lateral E/e': 17.0  Medial E/e': 20.4     _____________________________________________________________________________  __                     Please do not hesitate to contact me if you have any questions/concerns.     Sincerely,     Ethan Freitas MD

## 2023-01-25 NOTE — PATIENT INSTRUCTIONS
"You were seen today in the Cardiovascular Clinic at the [facility].      Cardiology Providers you saw during your visit:  Dr. Ethan Freitas     Recommendations:     Return to see Yoselin Victoria in CORE clinic in 6 weeks    Return to see Dr Freitas in 4 months       Eat a heart healthy, low sodium diet.  Get 20 to 30 minutes of aerobic exercise 4 to 5 times per week as tolerated. (Examples of aerobic exercise include: walking, bicycling, swimming, running).     Thank you for your visit today!   Please MyChart message or call if you have any questions or concerns.      During Business Hours:  [phone], option # 1 ([facility])       After hours, weekends or holidays:   [phone], Option #4  Ask to speak to the On-Call Cardiologist. Inform them you are a heart failure patient at the [facility].      Corina Cuellar RN BSN CHFN  Cardiology Care Coordinator - C.O.R.[facility]  Questions and [phone]  First press #1 for the [facility] and then press #4 for \"Your Care Team\" to reach us Cardiology Nurses.    "

## 2023-01-25 NOTE — NURSING NOTE
Diet: Patient instructed regarding a heart failure healthy diet, including discussion of reduced fat and 2000 mg daily sodium restriction, daily weights, medication purpose and compliance, fluid restrictions and resources for patient and family to access for assistance with heart failure management.       Labs: Patient was given results of the laboratory testing obtained today and patient was instructed about when to return for the next laboratory testing.     Med Reconcile: Reviewed and verified all current medications with the patient. The updated medication list was printed and given to the patient. NO CHANGES. Med list updated with current doses, refills given.     Return Appointment: Patient given instructions regarding scheduling next clinic visit. RTC for CORE in 6 weeks. RTC to see Dr Flowers in 4 months.    *Letter given explaining that he is allowed to exercise 1-2 hours per day.     Patient stated he understood all health information given and agreed to call with further questions or concerns.     Yolanda Chery RN

## 2023-01-25 NOTE — LETTER
2023      RE: Palomo Patterson  [address] [place] [address]         To whom it may concern:    Palomo Patterson ( 1980) is under my care at [facility]. He is able to exercise 1-2 hours per day as desired. If you have questions about this or need further documentation to support this, please do reach out to my nurse (contact information below).      Sincerely,    Ethan Freitas MD       [phone] option 1 to reach staff

## 2023-01-26 ENCOUNTER — TELEPHONE (OUTPATIENT)
Dept: CARDIOLOGY | Facility: CLINIC | Age: 43
End: 2023-01-26
Payer: MEDICAID

## 2023-01-26 DIAGNOSIS — I50.43 ACUTE ON CHRONIC COMBINED SYSTOLIC AND DIASTOLIC CONGESTIVE HEART FAILURE (H): ICD-10-CM

## 2023-01-26 DIAGNOSIS — I50.43 ACUTE ON CHRONIC COMBINED SYSTOLIC AND DIASTOLIC HEART FAILURE (H): ICD-10-CM

## 2023-01-26 DIAGNOSIS — J45.909 ASTHMA: Primary | ICD-10-CM

## 2023-01-26 RX ORDER — LOSARTAN POTASSIUM 100 MG/1
TABLET ORAL
Qty: 90 TABLET | Status: CANCELLED | OUTPATIENT
Start: 2023-01-26

## 2023-01-26 NOTE — TELEPHONE ENCOUNTER
[facility] Call Center    Phone Message    May a detailed message be left on voicemail: yes     Reason for Call: Medication Refill Request    Has the patient contacted the pharmacy for the refill? Yes   Name of medication being requested: Alvesco 160MCG Inhaler, Albuterol HFA Inhaler, and losartan (COZAAR) 100 MG tablet  Provider who prescribed the medication: Dr. Freitas  Pharmacy: [place] [id number]  Date medication is needed: 01/27/23   Patient woould like a refill on all three medications, patient can be reached at [phone] for any questions.      Action Taken: Other: Cardiology    Travel Screening: Not Applicable     Thank you!  [facility]

## 2023-01-26 NOTE — TELEPHONE ENCOUNTER
Letter by Iris Arriaga FNP at      Author: Iris Arriaga FNP Service: -- Author Type: --    Filed:  Date of Service:  Status: (Other)         August 4, 2020     Patient: Justice Aguirre   YOB: 1963   Date of Visit: 8/4/2020       To Whom It May Concern:    It is my medical opinion that Justice Aguirre may return to work on 8/4/2020.   Employees recovering from concussions often exhibit cognitive symptoms that make attending work and learning difficult. They may not be able to attend work or only partial days. Some symptoms that could affect performance in the work environment  include: sensitivity to light and noise, headache, trouble focusing, concentrating, or remembering, and difficulty looking at a screen. The accommodations below often help reduce the symptoms and allow them to return to work quicker. Compliance with these accommodations allows the brain to recover more quickly even if it appears the employee is symptom free.     Attendance Restrictions  Full shifts as tolerated   Other Restrictions:  1. Allow time off for medical appointments  2. Allow patient to take 10 minute breaks every 60 minutes   3. Allow patient to take a break if he starts to have symptoms, If symptoms continue or worsen please allow patient to return home.  4.  Allow employee to wear sunglasses and hat to help patient's sensitivity to lights.   5.  Patient should be able to leave at 6 AM if he has increased concussive symptoms, severe headache, with no penalty to the patient's job performance  6.  If patient wakes with severe headache please allow patient to start work later, it symptoms worsen patient should not attempt to work.      If you have any questions or concerns, please don't hesitate to call.    Sincerely,        Electronically signed by ROHITH Brown        losartan (COZAAR) 100 MG tablet  Last Written Prescription Date:  1/13/2020  Last Fill Quantity: 90,   # refills: 0  Last Office Visit :  1/25/2023  Future Office visit:   3/8/2023  Routing refill request to provider for review/approval because:  Gap in refills.   Last filled 1/13/2020  Refer to clinic for review       albuterol (PROAIR HFA/PROVENTIL HFA/VENTOLIN HFA) 108 (90 Base) MCG/ACT inhaler  Not on updated med list    ciclesonide (ALVESCO) 160 MCG/ACT inhaler  Not on updated med list    Cristel Yost RN  Central Triage Red Flags/Med Refills

## 2023-01-26 NOTE — TELEPHONE ENCOUNTER
[facility] Call Center    Phone Message    May a detailed message be left on voicemail: yes     Reason for Call: Other: Pt's significant other [place] stated that pt does have a complete understanding of how he should taket he Furosemide as the nurse went over it in detail yesterday after his appt.  there is no need to call and go over it again.     Action Taken: Message routed to:  [facility] ([facility] cardio    Travel Screening: Not Applicable     Thank you!  [facility]

## 2023-01-27 RX ORDER — ALBUTEROL SULFATE 90 UG/1
1-2 AEROSOL, METERED RESPIRATORY (INHALATION) EVERY 6 HOURS PRN
Qty: 18 G | Refills: 0 | Status: SHIPPED | OUTPATIENT
Start: 2023-01-27

## 2023-01-27 RX ORDER — FUROSEMIDE 20 MG
TABLET ORAL
Qty: 30 TABLET | Refills: 1 | Status: SHIPPED | OUTPATIENT
Start: 2023-01-27 | End: 2023-08-01

## 2023-01-27 RX ORDER — LOSARTAN POTASSIUM 100 MG/1
100 TABLET ORAL DAILY
Qty: 90 TABLET | Refills: 3 | Status: SHIPPED | OUTPATIENT
Start: 2023-01-27

## 2023-02-03 RX ORDER — ALBUTEROL SULFATE 90 UG/1
2 AEROSOL, METERED RESPIRATORY (INHALATION) EVERY 6 HOURS PRN
Qty: 18 G | OUTPATIENT
Start: 2023-02-03

## 2023-03-02 DIAGNOSIS — I50.43 ACUTE ON CHRONIC COMBINED SYSTOLIC AND DIASTOLIC HEART FAILURE (H): Primary | ICD-10-CM

## 2023-03-08 ENCOUNTER — LAB (OUTPATIENT)
Dept: LAB | Facility: CLINIC | Age: 43
End: 2023-03-08
Payer: MEDICAID

## 2023-03-08 ENCOUNTER — OFFICE VISIT (OUTPATIENT)
Dept: CARDIOLOGY | Facility: CLINIC | Age: 43
End: 2023-03-08
Attending: NURSE PRACTITIONER
Payer: MEDICAID

## 2023-03-08 VITALS
WEIGHT: 191.4 LBS | OXYGEN SATURATION: 97 % | BODY MASS INDEX: 33.91 KG/M2 | HEIGHT: 63 IN | HEART RATE: 83 BPM | SYSTOLIC BLOOD PRESSURE: 125 MMHG | DIASTOLIC BLOOD PRESSURE: 93 MMHG

## 2023-03-08 DIAGNOSIS — I50.82 BIVENTRICULAR HEART FAILURE (H): ICD-10-CM

## 2023-03-08 DIAGNOSIS — I50.43 ACUTE ON CHRONIC COMBINED SYSTOLIC AND DIASTOLIC HEART FAILURE (H): ICD-10-CM

## 2023-03-08 LAB
ANION GAP SERPL CALCULATED.3IONS-SCNC: 9 MMOL/L (ref 7–15)
BUN SERPL-MCNC: 23 MG/DL (ref 6–20)
CALCIUM SERPL-MCNC: 9.2 MG/DL (ref 8.6–10)
CHLORIDE SERPL-SCNC: 107 MMOL/L (ref 98–107)
CREAT SERPL-MCNC: 1.18 MG/DL (ref 0.67–1.17)
DEPRECATED HCO3 PLAS-SCNC: 26 MMOL/L (ref 22–29)
GFR SERPL CREATININE-BSD FRML MDRD: 80 ML/MIN/1.73M2
GLUCOSE SERPL-MCNC: 100 MG/DL (ref 70–99)
POTASSIUM SERPL-SCNC: 4.1 MMOL/L (ref 3.4–5.3)
SODIUM SERPL-SCNC: 142 MMOL/L (ref 136–145)

## 2023-03-08 PROCEDURE — 36415 COLL VENOUS BLD VENIPUNCTURE: CPT | Performed by: PATHOLOGY

## 2023-03-08 PROCEDURE — 80048 BASIC METABOLIC PNL TOTAL CA: CPT | Performed by: PATHOLOGY

## 2023-03-08 PROCEDURE — 99213 OFFICE O/P EST LOW 20 MIN: CPT | Performed by: NURSE PRACTITIONER

## 2023-03-08 PROCEDURE — G0463 HOSPITAL OUTPT CLINIC VISIT: HCPCS | Performed by: NURSE PRACTITIONER

## 2023-03-08 ASSESSMENT — PAIN SCALES - GENERAL: PAINLEVEL: NO PAIN (0)

## 2023-03-08 NOTE — NURSING NOTE
Chief Complaint   Patient presents with     Follow Up     Return CORE; 42yr old male with a h/o chronic biventricular heart failure presenting for HF follow up with labs prior.           Vitals were taken and medications reconciled.     Michel Lo, EMT   3:47 PM

## 2023-03-08 NOTE — PATIENT INSTRUCTIONS
Take your medicines every day, as directed    Changes made today:  No medication changes  \   Monitor Your Weight and Symptoms    Contact us if you:    Gain 2 pounds in one day or 5 pounds in one week  Feel more short of breath  Notice more leg swelling  Feel lightheadeded   Change your lifestyle    Limit Salt or Sodium:  2000 mg  Limit Fluids:  2000 mL or approximately 64 ounces  Eat a Heart Healthy Diet  Low in saturated fats  Stay Active:  Aim to move at least 150 minutes every  week         To Contact us    During Business Hours:  [phone], option # 1      After hours, weekends or holidays:   [phone], Option #4  Ask to speak to the On-Call Cardiologist. Inform them you are a CORE/heart failure patient at the [facility].     Use [facility] allows you to communicate directly with your heart team through secure messaging.  [facility] can be accessed any time on your phone, computer, or tablet.  If you need assistance, we'd be happy to help!         Keep your Heart Appointments:    Follow up with Dr Freitas in 6 months with labs prior     Please consider attending our virtual support group which is held monthly. Please reach out to Ervin at [phone] for more information if you are interested in attending.       2023 dates:    Monday, April 3rd, 1-2pm (Topic: Remote monitoring)  Monday, May 1st, 1-2pm (Topic: CORE clinic)  Monday, June 5th, 1-2pm  Monday, July 3rd, 1-2pm  Monday, August 7th, 1-2pm  Monday, September 11th, 1-2pm  Monday, October 2nd, 1-2pm  Monday, November 6th, 1-2pm  Monday, December 4th, 1-2pm

## 2023-03-08 NOTE — LETTER
3/8/2023      RE: Palomo Patterson  [address]  [place] [address]       Dear Colleague,    Thank you for the opportunity to participate in the care of your patient, Palomo Patterson, at the [facility] [place] [facility]. Please see a copy of my visit note below.    HPI: 42 year old male patient with previous HFrEF with recovered function 2/2 to meth use [place] for [place] [facility] He [place] and has been free of chemical use for 3 years. He is doing well. 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 Pt states energy level is good. He has no concerns at today's visit.     PAST MEDICAL HISTORY:  Past Medical History:   Diagnosis Date     Hypertension        FAMILY HISTORY:  Family History   Problem Relation Age of Onset     Depression Mother      Anxiety Disorder Mother      Myocardial Infarction Mother         age of onset unknown     Myocardial Infarction Father         age of onset unknown     Cardiac Sudden Death Brother         had sudden cardiac death at 36       SOCIAL HISTORY:  Social History     Socioeconomic History     Marital status: Single     Spouse name: None     Number of children: None     Years of education: None     Highest education level: None   Tobacco Use     Smoking status: Every Day     Packs/day: 0.25     Types: Cigarettes     Smokeless tobacco: Never   Substance and Sexual Activity     Alcohol use: No     Comment: social events only 1 drink 3-4 times per year     Drug use: Yes     Frequency: 3.0 times per week     Types: Methamphetamines     Comment: 1 gram 2-3 times per week       CURRENT MEDICATIONS:  Current Outpatient Medications   Medication Sig Dispense Refill     albuterol (PROAIR HFA/PROVENTIL HFA/VENTOLIN HFA) 108 (90 Base) MCG/ACT inhaler Inhale 1-2 puffs into the lungs every 6 hours as needed for shortness of  "breath, wheezing or cough Next refills to come from Primary Care doctor. 18 g 0     amLODIPine (NORVASC) 2.5 MG tablet Take 1 tablet (2.5 mg) by mouth daily 90 tablet 1     carvedilol (COREG) 12.5 MG tablet Take 1 tablet (12.5 mg) by mouth 2 times daily (with meals) 180 tablet 1     furosemide (LASIX) 20 MG tablet Take 20mg (1 tablet) once daily as needed for 2-3lb weight increase and symptoms. Please call Heart Failure/CORE clinic if taking. 30 tablet 1     hydrALAZINE (APRESOLINE) 100 MG tablet Take 1 tablet (100 mg) by mouth 3 times daily 270 tablet 3     isosorbide mononitrate (IMDUR) 60 MG 24 hr tablet Take 1 tablet (60 mg) by mouth every morning Please make a follow up appointment for further refills. 90 tablet 1     losartan (COZAAR) 100 MG tablet Take 1 tablet (100 mg) by mouth daily 90 tablet 3     lidocaine-EPINEPHrine 1 %-1:670661 SOLN injection 1 mL by Other route (Patient not taking: Reported on 1/25/2023)         ROS:   Constitutional: No fever, chills, or sweats. No weight gain/loss.   ENT: No visual disturbance, ear ache, epistaxis, sore throat.   Allergies/Immunologic: Negative.   Respiratory: No cough, hemoptysis.   Cardiovascular: As per HPI.   GI: No nausea, vomiting, hematemesis, melena, or hematochezia.   : No urinary frequency, dysuria, or hematuria.   Integument: Negative.   Psychiatric: Negative.   Neuro: Negative.   Endocrinology: Negative.   Musculoskeletal: Negative.    EXAM:  BP (!) 125/93 (BP Location: Right arm, Patient Position: Sitting, Cuff Size: Adult Large)   Pulse 83   Ht 1.598 m (5' 2.91\")   Wt 86.8 kg (191 lb 6.4 oz)   SpO2 97%   BMI 34.00 kg/m    General: appears comfortable, alert and articulate  Head: normocephalic, atraumatic  Eyes: anicteric sclera, EOMI  Neck: no adenopathy  Orophyarynx: moist mucosa, no lesions, dentition intact  Heart: regular, S1/S2, no murmur, gallop, rub, estimated JVP 8cm  Lungs: clear, no rales or wheezing  Abdomen: soft, non-tender, bowel " sounds present, no hepatosplenomegaly  Extremities: no clubbing, cyanosis or edema  Neurological: normal speech and affect, no gross motor deficits    Labs:  CBC RESULTS:  Lab Results   Component Value Date    WBC 6.5 01/25/2023    WBC 11.6 (H) 04/15/2018    RBC 4.89 01/25/2023    RBC 5.39 04/15/2018    HGB 14.0 01/25/2023    HGB 15.4 04/15/2018    HCT 43.0 01/25/2023    HCT 46.8 04/15/2018    MCV 88 01/25/2023    MCV 87 04/15/2018    MCH 28.6 01/25/2023    MCH 28.6 04/15/2018    MCHC 32.6 01/25/2023    MCHC 32.9 04/15/2018    RDW 18.6 (H) 01/25/2023    RDW 15.6 (H) 04/15/2018     01/25/2023     04/15/2018       CMP RESULTS:  Lab Results   Component Value Date     03/08/2023     09/18/2018    POTASSIUM 4.1 03/08/2023    POTASSIUM 3.6 09/18/2018    CHLORIDE 107 03/08/2023    CHLORIDE 107 09/18/2018    CO2 26 03/08/2023    CO2 27 09/18/2018    ANIONGAP 9 03/08/2023    ANIONGAP 6 09/18/2018     (H) 03/08/2023    GLC 98 09/18/2018    BUN 23.0 (H) 03/08/2023    BUN 19 09/18/2018    CR 1.18 (H) 03/08/2023    CR 1.50 (H) 09/18/2018    GFRESTIMATED 80 03/08/2023    GFRESTIMATED 53 (L) 09/18/2018    GFRESTBLACK 64 09/18/2018    SAMARA 9.2 03/08/2023    SAMARA 8.5 09/18/2018    BILITOTAL 0.5 01/25/2023    BILITOTAL 1.3 04/05/2018    ALBUMIN 4.4 01/25/2023    ALBUMIN 3.1 (L) 04/05/2018    ALKPHOS 66 01/25/2023    ALKPHOS 76 04/05/2018    ALT 27 01/25/2023    ALT 58 04/05/2018    AST 24 01/25/2023    AST 57 (H) 04/05/2018        INR RESULTS:  No results found for: INR    Lab Results   Component Value Date    MAG 1.9 04/15/2018     No results found for: NTBNPI  Lab Results   Component Value Date    NTBNP <36 01/25/2023    NTBNP 3,640 (H) 05/08/2018       Assessment and Plan:   1. Chronic systolic  heart failure secondary to methamphetamine use, now with recovered EF and has been sober x 3 years.    Stage C  NYHA Class I  ACEi/ARB yes  BB yes  Aldosterone antagonist no  SCD prophylaxis does not meet  criteria for implant  % BiV pacing: N/A  Fluid status euvolemic  NSAID use: no  Sleep Apnea Evaluation: no    2. CKD: stable renal function       No medication changes made. Pt is stable. He was congratulated on substance use recovery.   Follow-up 6 months with Dr. Jah AARON  Patient Care Team:  No Ref-Primary, Physician as PCP - Yoselin Bower APRN CNP as Nurse Practitioner (Cardiology)  Cuca Flores, RN as Nurse Coordinator (Cardiology)  Yolanda Chery, DEBORAH as Nurse Coordinator (Cardiology)  Jose Daniel Ewing RN as Specialty Care Coordinator (Cardiology)  Ethan Freitas MD as Assigned Heart and Vascular Provider  SELF, REFERRED        Please do not hesitate to contact me if you have any questions/concerns.     Sincerely,     GARRETT Mata CNP

## 2023-03-08 NOTE — PROGRESS NOTES
HPI: 42 year old male patient with previous HFrEF with recovered function 2/2 to meth use [place] [facility] [place] and has been free of chemical use for 3 years. He is doing well. 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 Pt states energy level is good. He has no concerns at today's visit.     PAST MEDICAL HISTORY:  Past Medical History:   Diagnosis Date     Hypertension        FAMILY HISTORY:  Family History   Problem Relation Age of Onset     Depression Mother      Anxiety Disorder Mother      Myocardial Infarction Mother         age of onset unknown     Myocardial Infarction Father         age of onset unknown     Cardiac Sudden Death Brother         had sudden cardiac death at 36       SOCIAL HISTORY:  Social History     Socioeconomic History     Marital status: Single     Spouse name: None     Number of children: None     Years of education: None     Highest education level: None   Tobacco Use     Smoking status: Every Day     Packs/day: 0.25     Types: Cigarettes     Smokeless tobacco: Never   Substance and Sexual Activity     Alcohol use: No     Comment: social events only 1 drink 3-4 times per year     Drug use: Yes     Frequency: 3.0 times per week     Types: Methamphetamines     Comment: 1 gram 2-3 times per week       CURRENT MEDICATIONS:  Current Outpatient Medications   Medication Sig Dispense Refill     albuterol (PROAIR HFA/PROVENTIL HFA/VENTOLIN HFA) 108 (90 Base) MCG/ACT inhaler Inhale 1-2 puffs into the lungs every 6 hours as needed for shortness of breath, wheezing or cough Next refills to come from Primary Care doctor. 18 g 0     amLODIPine (NORVASC) 2.5 MG tablet Take 1 tablet (2.5 mg) by mouth daily 90 tablet 1     carvedilol (COREG) 12.5 MG tablet Take 1 tablet (12.5 mg) by mouth 2 times daily (with meals) 180 tablet 1     furosemide (LASIX) 20 MG tablet Take 20mg (1 tablet)  "once daily as needed for 2-3lb weight increase and symptoms. Please call Heart Failure/CORE clinic if taking. 30 tablet 1     hydrALAZINE (APRESOLINE) 100 MG tablet Take 1 tablet (100 mg) by mouth 3 times daily 270 tablet 3     isosorbide mononitrate (IMDUR) 60 MG 24 hr tablet Take 1 tablet (60 mg) by mouth every morning Please make a follow up appointment for further refills. 90 tablet 1     losartan (COZAAR) 100 MG tablet Take 1 tablet (100 mg) by mouth daily 90 tablet 3     lidocaine-EPINEPHrine 1 %-1:579815 SOLN injection 1 mL by Other route (Patient not taking: Reported on 1/25/2023)         ROS:   Constitutional: No fever, chills, or sweats. No weight gain/loss.   ENT: No visual disturbance, ear ache, epistaxis, sore throat.   Allergies/Immunologic: Negative.   Respiratory: No cough, hemoptysis.   Cardiovascular: As per HPI.   GI: No nausea, vomiting, hematemesis, melena, or hematochezia.   : No urinary frequency, dysuria, or hematuria.   Integument: Negative.   Psychiatric: Negative.   Neuro: Negative.   Endocrinology: Negative.   Musculoskeletal: Negative.    EXAM:  BP (!) 125/93 (BP Location: Right arm, Patient Position: Sitting, Cuff Size: Adult Large)   Pulse 83   Ht 1.598 m (5' 2.91\")   Wt 86.8 kg (191 lb 6.4 oz)   SpO2 97%   BMI 34.00 kg/m    General: appears comfortable, alert and articulate  Head: normocephalic, atraumatic  Eyes: anicteric sclera, EOMI  Neck: no adenopathy  Orophyarynx: moist mucosa, no lesions, dentition intact  Heart: regular, S1/S2, no murmur, gallop, rub, estimated JVP 8cm  Lungs: clear, no rales or wheezing  Abdomen: soft, non-tender, bowel sounds present, no hepatosplenomegaly  Extremities: no clubbing, cyanosis or edema  Neurological: normal speech and affect, no gross motor deficits    Labs:  CBC RESULTS:  Lab Results   Component Value Date    WBC 6.5 01/25/2023    WBC 11.6 (H) 04/15/2018    RBC 4.89 01/25/2023    RBC 5.39 04/15/2018    HGB 14.0 01/25/2023    B 15.4 " 04/15/2018    HCT 43.0 01/25/2023    HCT 46.8 04/15/2018    MCV 88 01/25/2023    MCV 87 04/15/2018    MCH 28.6 01/25/2023    MCH 28.6 04/15/2018    MCHC 32.6 01/25/2023    MCHC 32.9 04/15/2018    RDW 18.6 (H) 01/25/2023    RDW 15.6 (H) 04/15/2018     01/25/2023     04/15/2018       CMP RESULTS:  Lab Results   Component Value Date     03/08/2023     09/18/2018    POTASSIUM 4.1 03/08/2023    POTASSIUM 3.6 09/18/2018    CHLORIDE 107 03/08/2023    CHLORIDE 107 09/18/2018    CO2 26 03/08/2023    CO2 27 09/18/2018    ANIONGAP 9 03/08/2023    ANIONGAP 6 09/18/2018     (H) 03/08/2023    GLC 98 09/18/2018    BUN 23.0 (H) 03/08/2023    BUN 19 09/18/2018    CR 1.18 (H) 03/08/2023    CR 1.50 (H) 09/18/2018    GFRESTIMATED 80 03/08/2023    GFRESTIMATED 53 (L) 09/18/2018    GFRESTBLACK 64 09/18/2018    SAMARA 9.2 03/08/2023    SAMARA 8.5 09/18/2018    BILITOTAL 0.5 01/25/2023    BILITOTAL 1.3 04/05/2018    ALBUMIN 4.4 01/25/2023    ALBUMIN 3.1 (L) 04/05/2018    ALKPHOS 66 01/25/2023    ALKPHOS 76 04/05/2018    ALT 27 01/25/2023    ALT 58 04/05/2018    AST 24 01/25/2023    AST 57 (H) 04/05/2018        INR RESULTS:  No results found for: INR    Lab Results   Component Value Date    MAG 1.9 04/15/2018     No results found for: NTBNPI  Lab Results   Component Value Date    NTBNP <36 01/25/2023    NTBNP 3,640 (H) 05/08/2018       Assessment and Plan:   1. Chronic systolic  heart failure secondary to methamphetamine use, now with recovered EF and has been sober x 3 years.    Stage C  NYHA Class I  ACEi/ARB yes  BB yes  Aldosterone antagonist no  SCD prophylaxis does not meet criteria for implant  % BiV pacing: N/A  Fluid status euvolemic  NSAID use: no  Sleep Apnea Evaluation: no    2. CKD: stable renal function       No medication changes made. Pt is stable. He was congratulated on substance use recovery.   Follow-up 6 months with Dr. Jah AARON  Patient Care Team:  No Ref-Primary, Physician as PCP -  Yoselin Bower APRN CNP as Nurse Practitioner (Cardiology)  Cuca Flores, RN as Nurse Coordinator (Cardiology)  Yolanda Chery, RN as Nurse Coordinator (Cardiology)  Jose Daniel Ewing RN as Specialty Care Coordinator (Cardiology)  Ethan Freitas MD as Assigned Heart and Vascular Provider  SELF, REFERRED

## 2023-03-08 NOTE — NURSING NOTE
Labs: Patient was given results of the laboratory testing obtained today and patient was instructed about when to return for the next laboratory testing.     Med Reconcile: Reviewed and verified all current medications with the patient. The updated medication list was printed and given to the patient. No changes.     Return Appointment: Patient given instructions regarding scheduling next clinic visit. RTC to see Dr Flowers in 6 months with labs prior.    Patient stated he understood all health information given and agreed to call with further questions or concerns.     Yolanda Chery RN

## 2023-04-15 ENCOUNTER — HEALTH MAINTENANCE LETTER (OUTPATIENT)
Age: 43
End: 2023-04-15

## 2023-07-31 DIAGNOSIS — I50.43 ACUTE ON CHRONIC COMBINED SYSTOLIC AND DIASTOLIC HEART FAILURE (H): ICD-10-CM

## 2023-07-31 DIAGNOSIS — I10 BENIGN ESSENTIAL HYPERTENSION: ICD-10-CM

## 2023-07-31 DIAGNOSIS — I50.82 BIVENTRICULAR HEART FAILURE (H): ICD-10-CM

## 2023-07-31 DIAGNOSIS — I50.43 ACUTE ON CHRONIC COMBINED SYSTOLIC AND DIASTOLIC CONGESTIVE HEART FAILURE (H): ICD-10-CM

## 2023-08-01 NOTE — TELEPHONE ENCOUNTER
amLODIPine (NORVASC) 2.5 MG tablet      Last Written Prescription Date:  1/25/2023  Last Fill Quantity: 90,   # refills: 1      carvedilol (COREG) 12.5 MG tablet      Last Written Prescription Date:  1/25/2023  Last Fill Quantity: 180,   # refills: 1     furosemide (LASIX) 20 MG tablet      Last Written Prescription Date:  1/27/2023  Last Fill Quantity: 30,   # refills: 1     isosorbide mononitrate (IMDUR) 60 MG 24 hr tablet      Last Written Prescription Date:  1/25/2023  Last Fill Quantity: 90,   # refills: 1    Last Office Visit : 3/8/2023  [facility]  Future Office visit:  9/6/2023    Routing refill requests to provider for review/approval because:  Failed medication protocol: abnormal lab and BP above medication protocol parameters.  - Creatinine (H)  - recent BP >140/90      Creatinine   Date Value Ref Range Status   03/08/2023 1.18 (H) 0.67 - 1.17 mg/dL Final   09/18/2018 1.50 (H) 0.66 - 1.25 mg/dL Final     BP Readings from Last 1 Encounters:   03/08/23 (!) 125/93

## 2023-08-03 RX ORDER — CARVEDILOL 12.5 MG/1
12.5 TABLET ORAL 2 TIMES DAILY WITH MEALS
Qty: 180 TABLET | Refills: 0 | Status: SHIPPED | OUTPATIENT
Start: 2023-08-03

## 2023-08-03 RX ORDER — AMLODIPINE BESYLATE 2.5 MG/1
2.5 TABLET ORAL DAILY
Qty: 90 TABLET | Refills: 0 | Status: SHIPPED | OUTPATIENT
Start: 2023-08-03 | End: 2023-12-29

## 2023-08-03 RX ORDER — FUROSEMIDE 20 MG
TABLET ORAL
Qty: 30 TABLET | Refills: 1 | Status: SHIPPED | OUTPATIENT
Start: 2023-08-03

## 2023-08-03 RX ORDER — ISOSORBIDE MONONITRATE 60 MG/1
60 TABLET, EXTENDED RELEASE ORAL EVERY MORNING
Qty: 90 TABLET | Refills: 0 | Status: SHIPPED | OUTPATIENT
Start: 2023-08-03 | End: 2023-12-22

## 2023-09-05 ENCOUNTER — PRE VISIT (OUTPATIENT)
Dept: CARDIOLOGY | Facility: CLINIC | Age: 43
End: 2023-09-05
Payer: COMMERCIAL

## 2023-09-05 DIAGNOSIS — I50.82 BIVENTRICULAR HEART FAILURE (H): Primary | ICD-10-CM

## 2023-09-06 ENCOUNTER — LAB (OUTPATIENT)
Dept: LAB | Facility: CLINIC | Age: 43
End: 2023-09-06
Payer: COMMERCIAL

## 2023-09-06 ENCOUNTER — OFFICE VISIT (OUTPATIENT)
Dept: CARDIOLOGY | Facility: CLINIC | Age: 43
End: 2023-09-06
Attending: INTERNAL MEDICINE
Payer: COMMERCIAL

## 2023-09-06 VITALS
BODY MASS INDEX: 32.97 KG/M2 | DIASTOLIC BLOOD PRESSURE: 79 MMHG | SYSTOLIC BLOOD PRESSURE: 125 MMHG | OXYGEN SATURATION: 96 % | HEART RATE: 84 BPM | WEIGHT: 185.6 LBS

## 2023-09-06 DIAGNOSIS — I50.82 BIVENTRICULAR HEART FAILURE (H): ICD-10-CM

## 2023-09-06 LAB
ALBUMIN SERPL BCG-MCNC: 4.4 G/DL (ref 3.5–5.2)
ALP SERPL-CCNC: 65 U/L (ref 40–129)
ALT SERPL W P-5'-P-CCNC: 24 U/L (ref 0–70)
ANION GAP SERPL CALCULATED.3IONS-SCNC: 12 MMOL/L (ref 7–15)
AST SERPL W P-5'-P-CCNC: 24 U/L (ref 0–45)
BILIRUB SERPL-MCNC: 0.8 MG/DL
BUN SERPL-MCNC: 18.5 MG/DL (ref 6–20)
CALCIUM SERPL-MCNC: 8.8 MG/DL (ref 8.6–10)
CHLORIDE SERPL-SCNC: 108 MMOL/L (ref 98–107)
CREAT SERPL-MCNC: 1.12 MG/DL (ref 0.67–1.17)
DEPRECATED HCO3 PLAS-SCNC: 21 MMOL/L (ref 22–29)
EGFRCR SERPLBLD CKD-EPI 2021: 84 ML/MIN/1.73M2
ERYTHROCYTE [DISTWIDTH] IN BLOOD BY AUTOMATED COUNT: 12.3 % (ref 10–15)
GLUCOSE SERPL-MCNC: 151 MG/DL (ref 70–99)
HCT VFR BLD AUTO: 40.5 % (ref 40–53)
HGB BLD-MCNC: 14.6 G/DL (ref 13.3–17.7)
MCH RBC QN AUTO: 34.4 PG (ref 26.5–33)
MCHC RBC AUTO-ENTMCNC: 36 G/DL (ref 31.5–36.5)
MCV RBC AUTO: 95 FL (ref 78–100)
NT-PROBNP SERPL-MCNC: <36 PG/ML (ref 0–450)
PLATELET # BLD AUTO: 193 10E3/UL (ref 150–450)
POTASSIUM SERPL-SCNC: 3.6 MMOL/L (ref 3.4–5.3)
PROT SERPL-MCNC: 6.7 G/DL (ref 6.4–8.3)
RBC # BLD AUTO: 4.25 10E6/UL (ref 4.4–5.9)
SODIUM SERPL-SCNC: 141 MMOL/L (ref 136–145)
WBC # BLD AUTO: 6.2 10E3/UL (ref 4–11)

## 2023-09-06 PROCEDURE — 99214 OFFICE O/P EST MOD 30 MIN: CPT | Performed by: INTERNAL MEDICINE

## 2023-09-06 PROCEDURE — 80053 COMPREHEN METABOLIC PANEL: CPT | Performed by: PATHOLOGY

## 2023-09-06 PROCEDURE — 85027 COMPLETE CBC AUTOMATED: CPT | Performed by: PATHOLOGY

## 2023-09-06 PROCEDURE — 36415 COLL VENOUS BLD VENIPUNCTURE: CPT | Performed by: PATHOLOGY

## 2023-09-06 PROCEDURE — G0463 HOSPITAL OUTPT CLINIC VISIT: HCPCS | Performed by: INTERNAL MEDICINE

## 2023-09-06 PROCEDURE — 83880 ASSAY OF NATRIURETIC PEPTIDE: CPT | Performed by: PATHOLOGY

## 2023-09-06 ASSESSMENT — PAIN SCALES - GENERAL: PAINLEVEL: NO PAIN (0)

## 2023-09-06 NOTE — LETTER
2023      RE: Palomo Patterson  [address]  [place] [address]       Dear Colleague,    Thank you for the opportunity to participate in the care of your patient, Palomo Patterson, at the [facility]. Please see a copy of my visit note below.      .    José Antonio Murillo MD   [address] [address]   FAX:  [phone]         Demetrio Parker MD    [address] [place] [address] [id number] [phone] (Fax)        RE:      Palomo Patterson           MRN:   [id number]   :   1980       IMPRESSION:   1.  Biventricular acute on chronic congestive heart failure.   2.  History of methamphetamine use (recurrent).   A.  History of multiple positive urine tests.   3.  History of smoking.   4.  Family history of atherosclerotic cardiovascular disease.   5.  Family history of sudden death.   6.  History of appendiceal rupture.  7.  CKD 3A  8.  Recent COVID disease    The patient returns for follow-up of heart failure.  There is no interim history of chest pain, tightness, paroxysmal nocturnal dyspnea, orthopnea, peripheral edema, palpitation, pre-syncope, syncope, device discharge.  Exercise tolerance is stable.  The patient is attempting to exercise regularly and following a sodium restricted, calorically appropriate diet.  Medications are reviewed and the patient is taking medications as prescribed.  The patient is generally sleeping well.       Constitutional: weight loss, fever, chills, night sweats  HEENT: without visual changes, swallow difficulties  Pulmonary: without shortness of breath, cough, wheeze, hemoptysis  Cardiac: without chest pain, NEWMAN, PND, orthopnea, edema, palpitation, pre-syncope, syncope,  GI: without diarrhea, constipation, jaundice, melena, GERD, hematemesis  : without frequency, urgency, dysuria, hematuria  Skin: rash, bruise, open lesions  Neuro: without TIA,  focal neurologic complaints, seizure, trauma  Ortho: without pain, swelling, mobility impairment  Endocrine: diabetes, thyroid, heat/cold intolerance, polyuria, polyphagia, change bowel habits.  Sleep:no ZACHARY, periodic breathing, fatigue  Other:       Latest Reference Range & Units 23 13:12   Sodium 136 - 145 mmol/L 141   Potassium 3.4 - 5.3 mmol/L 3.6   Chloride 98 - 107 mmol/L 108 (H)   Carbon Dioxide (CO2) 22 - 29 mmol/L 21 (L)   Urea Nitrogen 6.0 - 20.0 mg/dL 18.5   Creatinine 0.67 - 1.17 mg/dL 1.12   GFR Estimate >60 mL/min/1.73m2 84   Calcium 8.6 - 10.0 mg/dL 8.8   Anion Gap 7 - 15 mmol/L 12   Albumin 3.5 - 5.2 g/dL 4.4   Protein Total 6.4 - 8.3 g/dL 6.7   Alkaline Phosphatase 40 - 129 U/L 65   ALT 0 - 70 U/L 24   AST 0 - 45 U/L 24   Bilirubin Total <=1.2 mg/dL 0.8   Glucose 70 - 99 mg/dL 151 (H)   WBC 4.0 - 11.0 10e3/uL 6.2   Hemoglobin 13.3 - 17.7 g/dL 14.6   Hematocrit 40.0 - 53.0 % 40.5   Platelet Count 150 - 450 10e3/uL 193   RBC Count 4.40 - 5.90 10e6/uL 4.25 (L)   MCV 78 - 100 fL 95   MCH 26.5 - 33.0 pg 34.4 (H)   MCHC 31.5 - 36.5 g/dL 36.0       Constitutional: alert, oriented, normal gait and station, normal mentation.  Oral: moist mucous membranes  Lymph: without pathologic adenopathy  Chest: clear to ausculation and percussion  Cor: No evidence of left or right ventricular activity.  Rhythm is regular.  S1 normal, S2 split physiologically. Murmurs are not present  Abdomen: without tenderness, rebound, guarding, masses, ascites  Extremities: Edema not present  Neuro: no focal defects, normal mentation  Skin: without open lesions  Psych: oriented, verbal, mental status in tact      me: JAVI CASTELLANO  MRN: [id number]  : 1980  Study Date: 2023 03:38 PM  Age: 42 yrs  Gender: Male  Patient [facility]  Reason For Study: Biventricular heart failure (H)  Ordering Physician: DUSTIN SHAY  Referring Physician: DUSTIN SHAY  Performed By: Santo Castrejon     BSA: 1.8  m2  Height: 63 in  Weight: 175 lb  HR: 99  BP: 133/94 mmHg  ______________________________________________________________________________  Procedure  Echocardiogram with two-dimensional, color and spectral Doppler performed.  ______________________________________________________________________________  Interpretation Summary  Left ventricular size, wall motion and function are normal. The ejection  fraction is 55-60%.     Right ventricular function, chamber size, wall motion, and thickness are  normal.     No significant valvular abnormalities present.     The inferior vena cava is normal.     No pericardial effusion is present.     Compared to prior imaging on 8/2018 there has been an improvement (to normal)  left and right ventricular function.  ______________________________________________________________________________  Left Ventricle  Left ventricular size, wall motion and function are normal. The ejection  fraction is 55-60%.     Right Ventricle  Right ventricular function, chamber size, wall motion, and thickness are  normal.     Atria  The right atria appears normal. The left atrium appears normal. The atrial  septum is intact as assessed by color Doppler .     Mitral Valve  The mitral valve is normal. Trace mitral insufficiency is present.     Aortic Valve  Aortic valve is normal in structure and function. The aortic valve is  tricuspid. Trace aortic insufficiency is present.     Tricuspid Valve  The tricuspid valve is normal. The peak velocity of the tricuspid regurgitant  jet is not obtainable. Pulmonary artery systolic pressure cannot be assessed.     Pulmonic Valve  The pulmonic valve is normal.     Vessels  The inferior vena cava is normal. Sinuses of Valsalva 3.3 cm. Ascending aorta  3.1 cm. IVC diameter <2.1 cm collapsing >50% with sniff suggests a normal RA  pressure of 3 mmHg.     Pericardium  No pericardial effusion is present. Prominent epicardial fat is noted.     Miscellaneous  No  significant valvular abnormalities present.     Compared to Previous Study  Compared to prior imaging on 2018 there has been an improvement (to normal)  left and right ventricular function.  ______________________________________________________________________________  MMode/2D Measurements & Calculations  IVSd: 0.92 cm  LVIDd: 4.2 cm  LVIDs: 2.7 cm  LVPWd: 0.74 cm  FS: 35.4 %  LV mass(C)d: 106.9 grams  LV mass(C)dI: 58.5 grams/m2  Ao root diam: 3.3 cm  asc Aorta Diam: 3.1 cm  LVOT diam: 2.3 cm  LVOT area: 4.0 cm2  LA Volume (BP): 40.8 ml     LA Volume Index (BP): 22.3 ml/m2  RWT: 0.35     Doppler Measurements & Calculations  MV E max brayden: 82.0 cm/sec  MV A max brayden: 94.1 cm/sec  MV E/A: 0.87  MV dec slope: 621.4 cm/sec2  MV dec time: 0.13 sec  Ao V2 max: 152.0 cm/sec  Ao max P.2 mmHg  Ao V2 mean: 103.4 cm/sec  Ao mean P.9 mmHg  Ao V2 VTI: 28.1 cm  DANIEL(I,D): 3.0 cm2  DANIEL(V,D): 2.7 cm2  LV V1 max P.3 mmHg  LV V1 max: 103.9 cm/sec  LV V1 VTI: 21.4 cm  SV(LVOT): 85.1 ml  SI(LVOT): 46.6 ml/m2  PA V2 max: 116.7 cm/sec  PA max P.5 mmHg  AV Brayden Ratio (DI): 0.68  DANIEL Index (cm2/m2): 1.7  E/E' avg: 10.4  Lateral E/e': 8.4  Medial E/e': 12.4     __________________________________________________________________________      Wt Readings from Last 24 Encounters:   23 86.8 kg (191 lb 6.4 oz)   23 84.8 kg (187 lb)   18 85.1 kg (187 lb 9.6 oz)   18 79.4 kg (175 lb)   18 78.5 kg (173 lb)   18 79.4 kg (175 lb 1.6 oz)   18 78.9 kg (173 lb 14.4 oz)   18 77 kg (169 lb 12.8 oz)   18 77.6 kg (171 lb)   18 77.9 kg (171 lb 11.2 oz)   18 74.8 kg (165 lb)   04/15/18 73.3 kg (161 lb 9.6 oz)   18 78.6 kg (173 lb 3.2 oz)   18 81.2 kg (179 lb)   18 79.8 kg (176 lb)   18 83.9 kg (185 lb)   18 82.1 kg (181 lb)   17 78.5 kg (173 lb)   16 77.8 kg (171 lb 8 oz)   16 79.8 kg (176 lb)   16 78.5 kg (173 lb)       .        Current Outpatient Medications   Medication     amLODIPine (NORVASC) 2.5 MG tablet     benzonatate (TESSALON) 100 MG capsule     carvedilol (COREG) 12.5 MG tablet     carvedilol (COREG) 6.25 MG tablet     furosemide (LASIX) 20 MG tablet     hydrALAZINE (APRESOLINE) 100 MG tablet     isosorbide mononitrate (IMDUR) 60 MG 24 hr tablet     lidocaine-EPINEPHrine 1 %-1:027976 SOLN injection     losartan (COZAAR) 100 MG tablet     losartan (COZAAR) 50 MG tablet     No current facility-administered medications for this visit.                                  Please do not hesitate to contact me if you have any questions/concerns.     Sincerely,     Ethan Freitas MD

## 2023-09-06 NOTE — PROGRESS NOTES
.    José Antonio Murillo MD   [address] [address]   FAX:  [phone]         Demetrio Parker MD    [address] [place] [address]    [phone] (Fax)        RE:      Palomo Patterson           MRN:   [id number]   :   1980       IMPRESSION:   1.  Biventricular acute on chronic congestive heart failure.   2.  History of methamphetamine use (recurrent).   A.  History of multiple positive urine tests.   3.  History of smoking.   4.  Family history of atherosclerotic cardiovascular disease.   5.  Family history of sudden death.   6.  History of appendiceal rupture.  7.  CKD 3A  8.  Recent COVID disease    The patient returns for follow-up of heart failure.  There is no interim history of chest pain, tightness, paroxysmal nocturnal dyspnea, orthopnea, peripheral edema, palpitation, pre-syncope, syncope, device discharge.  Exercise tolerance is stable.  The patient is attempting to exercise regularly and following a sodium restricted, calorically appropriate diet.  Medications are reviewed and the patient is taking medications as prescribed.  The patient is generally sleeping well.       Constitutional: weight loss, fever, chills, night sweats  HEENT: without visual changes, swallow difficulties  Pulmonary: without shortness of breath, cough, wheeze, hemoptysis  Cardiac: without chest pain, NEWMAN, PND, orthopnea, edema, palpitation, pre-syncope, syncope,  GI: without diarrhea, constipation, jaundice, melena, GERD, hematemesis  : without frequency, urgency, dysuria, hematuria  Skin: rash, bruise, open lesions  Neuro: without TIA, focal neurologic complaints, seizure, trauma  Ortho: without pain, swelling, mobility impairment  Endocrine: diabetes, thyroid, heat/cold intolerance, polyuria, polyphagia, change bowel habits.  Sleep:no ZACHARY, periodic breathing, fatigue  Other:       Latest Reference Range & Units 23 13:12   Sodium 136 - 145 mmol/L 141    Potassium 3.4 - 5.3 mmol/L 3.6   Chloride 98 - 107 mmol/L 108 (H)   Carbon Dioxide (CO2) 22 - 29 mmol/L 21 (L)   Urea Nitrogen 6.0 - 20.0 mg/dL 18.5   Creatinine 0.67 - 1.17 mg/dL 1.12   GFR Estimate >60 mL/min/1.73m2 84   Calcium 8.6 - 10.0 mg/dL 8.8   Anion Gap 7 - 15 mmol/L 12   Albumin 3.5 - 5.2 g/dL 4.4   Protein Total 6.4 - 8.3 g/dL 6.7   Alkaline Phosphatase 40 - 129 U/L 65   ALT 0 - 70 U/L 24   AST 0 - 45 U/L 24   Bilirubin Total <=1.2 mg/dL 0.8   Glucose 70 - 99 mg/dL 151 (H)   WBC 4.0 - 11.0 10e3/uL 6.2   Hemoglobin 13.3 - 17.7 g/dL 14.6   Hematocrit 40.0 - 53.0 % 40.5   Platelet Count 150 - 450 10e3/uL 193   RBC Count 4.40 - 5.90 10e6/uL 4.25 (L)   MCV 78 - 100 fL 95   MCH 26.5 - 33.0 pg 34.4 (H)   MCHC 31.5 - 36.5 g/dL 36.0       Constitutional: alert, oriented, normal gait and station, normal mentation.  Oral: moist mucous membranes  Lymph: without pathologic adenopathy  Chest: clear to ausculation and percussion  Cor: No evidence of left or right ventricular activity.  Rhythm is regular.  S1 normal, S2 split physiologically. Murmurs are not present  Abdomen: without tenderness, rebound, guarding, masses, ascites  Extremities: Edema not present  Neuro: no focal defects, normal mentation  Skin: without open lesions  Psych: oriented, verbal, mental status in tact      me: JAVI CASTELLANO  MRN: [id number]  : 1980  Study Date: 2023 03:38 PM  Age: 42 yrs  Gender: Male  Patient [facility]: [facility]  Reason For Study: Biventricular heart failure (H)  Ordering Physician: DUSTIN SHAY  Referring Physician: DUSTIN SHAY  Performed By: Santo Castrejon     BSA: 1.8 m2  Height: 63 in  Weight: 175 lb  HR: 99  BP: 133/94 mmHg  ______________________________________________________________________________  Procedure  Echocardiogram with two-dimensional, color and spectral Doppler performed.  ______________________________________________________________________________  Interpretation  Summary  Left ventricular size, wall motion and function are normal. The ejection  fraction is 55-60%.     Right ventricular function, chamber size, wall motion, and thickness are  normal.     No significant valvular abnormalities present.     The inferior vena cava is normal.     No pericardial effusion is present.     Compared to prior imaging on 8/2018 there has been an improvement (to normal)  left and right ventricular function.  ______________________________________________________________________________  Left Ventricle  Left ventricular size, wall motion and function are normal. The ejection  fraction is 55-60%.     Right Ventricle  Right ventricular function, chamber size, wall motion, and thickness are  normal.     Atria  The right atria appears normal. The left atrium appears normal. The atrial  septum is intact as assessed by color Doppler .     Mitral Valve  The mitral valve is normal. Trace mitral insufficiency is present.     Aortic Valve  Aortic valve is normal in structure and function. The aortic valve is  tricuspid. Trace aortic insufficiency is present.     Tricuspid Valve  The tricuspid valve is normal. The peak velocity of the tricuspid regurgitant  jet is not obtainable. Pulmonary artery systolic pressure cannot be assessed.     Pulmonic Valve  The pulmonic valve is normal.     Vessels  The inferior vena cava is normal. Sinuses of Valsalva 3.3 cm. Ascending aorta  3.1 cm. IVC diameter <2.1 cm collapsing >50% with sniff suggests a normal RA  pressure of 3 mmHg.     Pericardium  No pericardial effusion is present. Prominent epicardial fat is noted.     Miscellaneous  No significant valvular abnormalities present.     Compared to Previous Study  Compared to prior imaging on 8/2018 there has been an improvement (to normal)  left and right ventricular function.  ______________________________________________________________________________  MMode/2D Measurements & Calculations  IVSd: 0.92  cm  LVIDd: 4.2 cm  LVIDs: 2.7 cm  LVPWd: 0.74 cm  FS: 35.4 %  LV mass(C)d: 106.9 grams  LV mass(C)dI: 58.5 grams/m2  Ao root diam: 3.3 cm  asc Aorta Diam: 3.1 cm  LVOT diam: 2.3 cm  LVOT area: 4.0 cm2  LA Volume (BP): 40.8 ml     LA Volume Index (BP): 22.3 ml/m2  RWT: 0.35     Doppler Measurements & Calculations  MV E max brayden: 82.0 cm/sec  MV A max brayden: 94.1 cm/sec  MV E/A: 0.87  MV dec slope: 621.4 cm/sec2  MV dec time: 0.13 sec  Ao V2 max: 152.0 cm/sec  Ao max P.2 mmHg  Ao V2 mean: 103.4 cm/sec  Ao mean P.9 mmHg  Ao V2 VTI: 28.1 cm  DANIEL(I,D): 3.0 cm2  DANIEL(V,D): 2.7 cm2  LV V1 max P.3 mmHg  LV V1 max: 103.9 cm/sec  LV V1 VTI: 21.4 cm  SV(LVOT): 85.1 ml  SI(LVOT): 46.6 ml/m2  PA V2 max: 116.7 cm/sec  PA max P.5 mmHg  AV Brayden Ratio (DI): 0.68  DANIEL Index (cm2/m2): 1.7  E/E' avg: 10.4  Lateral E/e': 8.4  Medial E/e': 12.4     __________________________________________________________________________      Wt Readings from Last 24 Encounters:   23 86.8 kg (191 lb 6.4 oz)   23 84.8 kg (187 lb)   18 85.1 kg (187 lb 9.6 oz)   18 79.4 kg (175 lb)   18 78.5 kg (173 lb)   18 79.4 kg (175 lb 1.6 oz)   18 78.9 kg (173 lb 14.4 oz)   18 77 kg (169 lb 12.8 oz)   18 77.6 kg (171 lb)   18 77.9 kg (171 lb 11.2 oz)   18 74.8 kg (165 lb)   04/15/18 73.3 kg (161 lb 9.6 oz)   18 78.6 kg (173 lb 3.2 oz)   18 81.2 kg (179 lb)   18 79.8 kg (176 lb)   18 83.9 kg (185 lb)   18 82.1 kg (181 lb)   17 78.5 kg (173 lb)   16 77.8 kg (171 lb 8 oz)   16 79.8 kg (176 lb)   16 78.5 kg (173 lb)      .        Current Outpatient Medications   Medication    amLODIPine (NORVASC) 2.5 MG tablet    benzonatate (TESSALON) 100 MG capsule    carvedilol (COREG) 12.5 MG tablet    carvedilol (COREG) 6.25 MG tablet    furosemide (LASIX) 20 MG tablet    hydrALAZINE (APRESOLINE) 100 MG tablet    isosorbide mononitrate (IMDUR) 60 MG 24  hr tablet    lidocaine-EPINEPHrine 1 %-1:609849 SOLN injection    losartan (COZAAR) 100 MG tablet    losartan (COZAAR) 50 MG tablet     No current facility-administered medications for this visit.

## 2023-09-06 NOTE — NURSING NOTE
Chief Complaint   Patient presents with    Follow Up     Return heart failure - 43yr old male with a h/o biventricular HF presenting for heart failure follow-up     Vitals were taken and medications reconciled.    ROB Mancia  3:50 PM

## 2023-09-06 NOTE — PATIENT INSTRUCTIONS
"You were seen today in the Cardiovascular Clinic at the [facility].      Cardiology Providers you saw during your visit:  Dr. Ethan Freitas     Recommendations:   No medication changes for today.  Please follow-up with Dr. Freitas in March with an Echocardiogram and labs.       Eat a heart healthy, low sodium diet.  Get 20 to 30 minutes of aerobic exercise 4 to 5 times per week as tolerated. (Examples of aerobic exercise include: walking, bicycling, swimming, running).     Thank you for your visit today!   Please MyChart message or call if you have any questions or concerns.      During Business Hours:  [phone], option # 1 ([facility])       After hours, weekends or holidays:   [phone], Option #4  Ask to speak to the On-Call Cardiologist. Inform them you are a heart failure patient at the [facility].      Corina Cuellar RN BSN CHFN  Cardiology Care Coordinator - [facility]  Questions and [phone]  First press #1 for the [facility] and then press #4 for \"Your Care Team\" to reach us Cardiology Nurses.                "

## 2023-12-20 ENCOUNTER — TELEPHONE (OUTPATIENT)
Dept: CARDIOLOGY | Facility: CLINIC | Age: 43
End: 2023-12-20
Payer: COMMERCIAL

## 2023-12-20 DIAGNOSIS — I50.43 ACUTE ON CHRONIC COMBINED SYSTOLIC AND DIASTOLIC CONGESTIVE HEART FAILURE (H): ICD-10-CM

## 2023-12-20 DIAGNOSIS — I10 BENIGN ESSENTIAL HYPERTENSION: ICD-10-CM

## 2023-12-20 DIAGNOSIS — I50.43 ACUTE ON CHRONIC COMBINED SYSTOLIC AND DIASTOLIC HEART FAILURE (H): ICD-10-CM

## 2023-12-20 NOTE — TELEPHONE ENCOUNTER
Left Voicemail (1st Attempt) for the patient to call back and schedule the following:    Appointment type: RHF  Provider: NONA  Return date: 03/06/24  Specialty phone number: [phone] OPT1   Additional appointment(s) needed: N/A   Additonal Notes: N/A

## 2023-12-21 ENCOUNTER — TELEPHONE (OUTPATIENT)
Dept: CARDIOLOGY | Facility: CLINIC | Age: 43
End: 2023-12-21
Payer: COMMERCIAL

## 2023-12-21 NOTE — TELEPHONE ENCOUNTER
Left Voicemail (1st Attempt) and Sent Mychart (1st Attempt) for the patient to call back and schedule the following:    Appointment type: Return Heart Failure  Provider: Dr. Freitas  Return date: March 2024  Specialty phone number: [phone] option 1  Additional appointment(s) needed: Echo and lab prior  Additonal Notes: 12/21 LVM and MYC for pt to call back to reschedule RHF w/ Dr. Freitas w/ lab and echo prior. MB

## 2023-12-22 RX ORDER — ISOSORBIDE MONONITRATE 60 MG/1
60 TABLET, EXTENDED RELEASE ORAL EVERY MORNING
Qty: 90 TABLET | Refills: 3 | Status: SHIPPED | OUTPATIENT
Start: 2023-12-22

## 2023-12-26 DIAGNOSIS — I10 BENIGN ESSENTIAL HYPERTENSION: ICD-10-CM

## 2023-12-26 DIAGNOSIS — I50.43 ACUTE ON CHRONIC COMBINED SYSTOLIC AND DIASTOLIC HEART FAILURE (H): ICD-10-CM

## 2023-12-26 DIAGNOSIS — I50.82 BIVENTRICULAR HEART FAILURE (H): ICD-10-CM

## 2023-12-26 DIAGNOSIS — I50.43 ACUTE ON CHRONIC COMBINED SYSTOLIC AND DIASTOLIC CONGESTIVE HEART FAILURE (H): ICD-10-CM

## 2023-12-28 NOTE — TELEPHONE ENCOUNTER
amLODIPine (NORVASC) 2.5 MG tablet 90 tablet 0 8/3/2023  Last Office Visit : 9/6/2023  [facility] Ethan Masters MD     Future Office visit:  0    Routing refill request to provider for review/approval because:  Possible gap in refill, sent for review.  BP Readings from Last 3 Encounters:   09/06/23 125/79   03/08/23 (!) 125/93   01/25/23 129/88

## 2023-12-29 RX ORDER — AMLODIPINE BESYLATE 2.5 MG/1
2.5 TABLET ORAL DAILY
Qty: 90 TABLET | Refills: 1 | Status: SHIPPED | OUTPATIENT
Start: 2023-12-29

## 2024-06-10 ENCOUNTER — TELEPHONE (OUTPATIENT)
Dept: CARDIOLOGY | Facility: CLINIC | Age: 44
End: 2024-06-10
Payer: COMMERCIAL

## 2024-06-10 DIAGNOSIS — I50.82 BIVENTRICULAR HEART FAILURE (H): Primary | ICD-10-CM

## 2024-06-10 NOTE — TELEPHONE ENCOUNTER
[facility] Call Center    Phone Message    May a detailed message be left on voicemail: yes     Reason for Call: Other: Patient sister call regarding patient needing a letter to be able to enter into boot camp. Patient is in [facility] correction[facility] facility and wants to join boot camp program. Pa would like you to reach out to patient with any questions. Thank you      Action Taken: Other: cardiology     Travel Screening: Not Applicable       Thank you!  Specialty Access Center    Date of Service:

## 2024-06-17 NOTE — TELEPHONE ENCOUNTER
"6/21/2024 10:07AM Roya Grijalva  6/21 MAX ATTEMPTS REACHED- unable to LVM for Pa, not sure if MYC or letter would be accessible for Pa. Sent message to Shakeel DEMARCO and will wait for further instructions. Message for Pa: Pt will need to see Dr. Freitas before he can be approved for Boot Camp.TINO Grijalva 6/21/2024 10:07AM         6/19/2024 10:21AM Roya Grijalva  6/19 Attempted to call pt's sister, Pa. Per Wilton Head, pt's sister's name is Pa and we are to call pt's phone number [phone]. Pt is scheduled to see Dr. Freitas and will need to see Dr. Freitas in order for him to approve \"Boot Camp\". TINO Grijalva 6/19/2024 10:21AM        6/19/2024 10:18AM Roya Grijalva  [facility] confirmed scheduled appointment:  Date: 7/31/2024  Time: 1PM  Visit type: Return Heart Failure  Provider: Dr. Freitas  Location: [address]rd [place] [address]  Testing/imaging: Labs and Echo prior to be completed at [facility], Fax number is [phone].  Additional notes: 6/19 [facility] would like pt to have labs and echo completed at their facility. Fax number is Fax [phone]. [phone]. Pt will need to see Dr. Freitas before Dr. Freitas can approve \"Boot Camp\". TINO Grijalva 6/19/2024 10:18AM        6/17/2024 11:02AM Roya Rudi  Left Voicemail (1st Attempt) LVM for [facility]  to schedule the following:    Appointment type: Return Heart Failure  Provider: Dr. Freitas  Return date: Next available (ok to take VAD/TX spots per Shakeel DEMARCO)  Specialty phone number: [phone] option 1  Additional appointment(s) needed: labs and echo prior  Additonal Notes: 6/17 LVM for [facility] Schedulers that pt will need RHF w/ Dr. Freitas w/ echo and labs prior. Ok to take VAD/TX spots per Shakeel DEMARCO. DO NOT SCHARE INCACERATED PT APPT INFO. MJ "   Roya Grijalva 6/17/2024 11:02AM         6/14/2024 3:36PM Roya Grijalva  Attempted to contact Wilton Head to get pt's sisters name/phone number for scheduling:    Appointment type: Return Heart Failure  Provider: Dr. Freitas  Return date: Next available (ok to take VAD/TX spots per Shakeel B)  Specialty phone number: [phone] option 1  Additional appointment(s) needed: labs and echo prior  Additonal Notes: 6/14 Sent email to Wilton Adilene to find out pt's sisters name/phone number so we can call her back. Let pt's sister know that pt will need to schedule w/ Dr. Freitas before Dr. Freitas can approve Boot Camp. Get Correctional Facility name and phone  number from pt's sister. Call correctional facility and schedule pt for RHF w/ Dr. Freitas w/ labs and echo prior. Ok to take VAD/TX spots per Shakeel B. DO NOT SHARE INCARCERATED PT APPT INFO. MJ    Roya Grijalva 6/14/2024 3:36PM

## 2024-06-22 ENCOUNTER — HEALTH MAINTENANCE LETTER (OUTPATIENT)
Age: 44
End: 2024-06-22

## 2024-08-25 NOTE — PROGRESS NOTES
.    José Antonio Murillo MD   [address] [address]   FAX:  [phone]         Demetrio Parker MD    [address] [place] [address]    [phone] (Fax)        RE:      Palomo Patterson       MRN:   [id number]   :   1980       IMPRESSION:   1.  Biventricular acute on chronic congestive heart failure.   2.  History of methamphetamine use (recurrent).   A.  History of multiple positive urine tests.   3.  History of smoking.   4.  Family history of atherosclerotic cardiovascular disease.   5.  Family history of sudden death.   6.  History of appendiceal rupture.  7.  CKD 3A  8.  Recent COVID disease    The patient returns for follow-up of heart failure.  There is no interim history of chest pain, tightness, paroxysmal nocturnal dyspnea, orthopnea, peripheral edema, palpitation, pre-syncope, syncope, device discharge.  Exercise tolerance is stable.  The patient is attempting to exercise regularly and following a sodium restricted, calorically appropriate diet.  Medications are reviewed and the patient is taking medications as prescribed.  The patient is generally sleeping well.       Constitutional: weight loss, fever, chills, night sweats  HEENT: without visual changes, swallow difficulties  Pulmonary: without shortness of breath, cough, wheeze, hemoptysis  Cardiac: without chest pain, NEWMAN, PND, orthopnea, edema, palpitation, pre-syncope, syncope,  GI: without diarrhea, constipation, jaundice, melena, GERD, hematemesis  : without frequency, urgency, dysuria, hematuria  Skin: rash, bruise, open lesions  Neuro: without TIA, focal neurologic complaints, seizure, trauma  Ortho: without pain, swelling, mobility impairment  Endocrine: diabetes, thyroid, heat/cold intolerance, polyuria, polyphagia, change bowel habits.  Sleep:no ZACHARY, periodic breathing, fatigue      Constitutional: alert, oriented, normal gait and station, normal mentation.  Oral: moist mucous  membranes  Lymph: without pathologic adenopathy  Chest: clear to ausculation and percussion  Cor: No evidence of left or right ventricular activity.  Rhythm is regular.  S1 normal, S2 split physiologically. Murmurs are not present  Abdomen: without tenderness, rebound, guarding, masses, ascites  Extremities: Edema not present  Neuro: no focal defects, normal mentation  Skin: without open lesions  Psych: oriented, verbal, mental status in tact      me: JAVI CASTELLANO  MRN: [id number]  : 1980  Study Date: 2023 03:38 PM  Age: 42 yrs  Gender: Male  Patient Location: [facility]  Reason For Study: Biventricular heart failure (H)  Ordering Physician: DUSTIN SHAY  Referring Physician: DUSTIN SHAY  Performed By: Santo Castrejon     BSA: 1.8 m2  Height: 63 in  Weight: 175 lb  HR: 99  BP: 133/94 mmHg  ______________________________________________________________________________  Procedure  Echocardiogram with two-dimensional, color and spectral Doppler performed.  ______________________________________________________________________________  Interpretation Summary  Left ventricular size, wall motion and function are normal. The ejection  fraction is 55-60%.     Right ventricular function, chamber size, wall motion, and thickness are  normal.     No significant valvular abnormalities present.     The inferior vena cava is normal.     No pericardial effusion is present.     Compared to prior imaging on 2018 there has been an improvement (to normal)  left and right ventricular function.  ______________________________________________________________________________  Left Ventricle  Left ventricular size, wall motion and function are normal. The ejection  fraction is 55-60%.     Right Ventricle  Right ventricular function, chamber size, wall motion, and thickness are  normal.     Atria  The right atria appears normal. The left atrium appears normal. The atrial  septum is intact as assessed by color  Doppler .     Mitral Valve  The mitral valve is normal. Trace mitral insufficiency is present.     Aortic Valve  Aortic valve is normal in structure and function. The aortic valve is  tricuspid. Trace aortic insufficiency is present.     Tricuspid Valve  The tricuspid valve is normal. The peak velocity of the tricuspid regurgitant  jet is not obtainable. Pulmonary artery systolic pressure cannot be assessed.     Pulmonic Valve  The pulmonic valve is normal.     Vessels  The inferior vena cava is normal. Sinuses of Valsalva 3.3 cm. Ascending aorta  3.1 cm. IVC diameter <2.1 cm collapsing >50% with sniff suggests a normal RA  pressure of 3 mmHg.     Pericardium  No pericardial effusion is present. Prominent epicardial fat is noted.     Miscellaneous  No significant valvular abnormalities present.     Compared to Previous Study  Compared to prior imaging on 2018 there has been an improvement (to normal)  left and right ventricular function.  ______________________________________________________________________________  MMode/2D Measurements & Calculations  IVSd: 0.92 cm  LVIDd: 4.2 cm  LVIDs: 2.7 cm  LVPWd: 0.74 cm  FS: 35.4 %  LV mass(C)d: 106.9 grams  LV mass(C)dI: 58.5 grams/m2  Ao root diam: 3.3 cm  asc Aorta Diam: 3.1 cm  LVOT diam: 2.3 cm  LVOT area: 4.0 cm2  LA Volume (BP): 40.8 ml     LA Volume Index (BP): 22.3 ml/m2  RWT: 0.35     Doppler Measurements & Calculations  MV E max brayden: 82.0 cm/sec  MV A max brayden: 94.1 cm/sec  MV E/A: 0.87  MV dec slope: 621.4 cm/sec2  MV dec time: 0.13 sec  Ao V2 max: 152.0 cm/sec  Ao max P.2 mmHg  Ao V2 mean: 103.4 cm/sec  Ao mean P.9 mmHg  Ao V2 VTI: 28.1 cm  DANIEL(I,D): 3.0 cm2  DANIEL(V,D): 2.7 cm2  LV V1 max P.3 mmHg  LV V1 max: 103.9 cm/sec  LV V1 VTI: 21.4 cm  SV(LVOT): 85.1 ml  SI(LVOT): 46.6 ml/m2  PA V2 max: 116.7 cm/sec  PA max P.5 mmHg  AV Brayden Ratio (DI): 0.68  DANIEL Index (cm2/m2): 1.7  E/E' avg: 10.4  Lateral E/e': 8.4  Medial E/e': 12.4      __________________________________________________________________________      Wt Readings from Last 24 Encounters:   09/06/23 84.2 kg (185 lb 9.6 oz)   03/08/23 86.8 kg (191 lb 6.4 oz)   01/25/23 84.8 kg (187 lb)   12/06/18 85.1 kg (187 lb 9.6 oz)   09/18/18 79.4 kg (175 lb)   08/14/18 78.5 kg (173 lb)   07/11/18 79.4 kg (175 lb 1.6 oz)   06/21/18 78.9 kg (173 lb 14.4 oz)   05/30/18 77 kg (169 lb 12.8 oz)   05/11/18 77.6 kg (171 lb)   05/08/18 77.9 kg (171 lb 11.2 oz)   05/01/18 74.8 kg (165 lb)   04/15/18 73.3 kg (161 lb 9.6 oz)   04/05/18 78.6 kg (173 lb 3.2 oz)   03/07/18 81.2 kg (179 lb)   02/27/18 79.8 kg (176 lb)   02/18/18 83.9 kg (185 lb)   01/02/18 82.1 kg (181 lb)   11/01/17 78.5 kg (173 lb)   12/23/16 77.8 kg (171 lb 8 oz)   07/14/16 79.8 kg (176 lb)   06/30/16 78.5 kg (173 lb)       .    Current Outpatient Medications   Medication Sig Dispense Refill    albuterol (PROAIR HFA/PROVENTIL HFA/VENTOLIN HFA) 108 (90 Base) MCG/ACT inhaler Inhale 1-2 puffs into the lungs every 6 hours as needed for shortness of breath, wheezing or cough Next refills to come from Primary Care doctor. 18 g 0    amLODIPine (NORVASC) 2.5 MG tablet TAKE ONE TABLET BY MOUTH EVERY DAY 90 tablet 1    carvedilol (COREG) 12.5 MG tablet Take 1 tablet (12.5 mg) by mouth 2 times daily (with meals) 180 tablet 0    furosemide (LASIX) 20 MG tablet Take 20mg (1 tablet) once daily as needed for 2-3lb weight increase and symptoms. Please call Heart Failure/CORE clinic if taking. 30 tablet 1    hydrALAZINE (APRESOLINE) 100 MG tablet Take 1 tablet (100 mg) by mouth 3 times daily 270 tablet 3    isosorbide mononitrate (IMDUR) 60 MG 24 hr tablet TAKE ONE TABLET BY MOUTH EVERY MORNING 90 tablet 3    losartan (COZAAR) 100 MG tablet Take 1 tablet (100 mg) by mouth daily 90 tablet 3    lidocaine-EPINEPHrine 1 %-1:916317 SOLN injection 1 mL by Other route (Patient not taking: Reported on 1/25/2023)       No current facility-administered medications for  this visit.

## 2024-08-28 ENCOUNTER — LAB (OUTPATIENT)
Dept: LAB | Facility: CLINIC | Age: 44
End: 2024-08-28
Payer: COMMERCIAL

## 2024-08-28 ENCOUNTER — OFFICE VISIT (OUTPATIENT)
Dept: CARDIOLOGY | Facility: CLINIC | Age: 44
End: 2024-08-28
Attending: INTERNAL MEDICINE
Payer: COMMERCIAL

## 2024-08-28 VITALS
BODY MASS INDEX: 33.38 KG/M2 | OXYGEN SATURATION: 96 % | HEART RATE: 59 BPM | WEIGHT: 187.9 LBS | SYSTOLIC BLOOD PRESSURE: 114 MMHG | DIASTOLIC BLOOD PRESSURE: 79 MMHG

## 2024-08-28 DIAGNOSIS — I50.82 BIVENTRICULAR HEART FAILURE (H): ICD-10-CM

## 2024-08-28 LAB
ALBUMIN SERPL BCG-MCNC: 4.4 G/DL (ref 3.5–5.2)
ALP SERPL-CCNC: 57 U/L (ref 40–150)
ALT SERPL W P-5'-P-CCNC: 21 U/L (ref 0–70)
ANION GAP SERPL CALCULATED.3IONS-SCNC: 11 MMOL/L (ref 7–15)
AST SERPL W P-5'-P-CCNC: 24 U/L (ref 0–45)
BILIRUB SERPL-MCNC: 0.9 MG/DL
BUN SERPL-MCNC: 15.3 MG/DL (ref 6–20)
CALCIUM SERPL-MCNC: 9.3 MG/DL (ref 8.8–10.4)
CHLORIDE SERPL-SCNC: 106 MMOL/L (ref 98–107)
CREAT SERPL-MCNC: 1.17 MG/DL (ref 0.67–1.17)
EGFRCR SERPLBLD CKD-EPI 2021: 79 ML/MIN/1.73M2
ERYTHROCYTE [DISTWIDTH] IN BLOOD BY AUTOMATED COUNT: 14.7 % (ref 10–15)
GLUCOSE SERPL-MCNC: 98 MG/DL (ref 70–99)
HCO3 SERPL-SCNC: 24 MMOL/L (ref 22–29)
HCT VFR BLD AUTO: 39 % (ref 40–53)
HGB BLD-MCNC: 13.7 G/DL (ref 13.3–17.7)
MCH RBC QN AUTO: 33.2 PG (ref 26.5–33)
MCHC RBC AUTO-ENTMCNC: 35.1 G/DL (ref 31.5–36.5)
MCV RBC AUTO: 94 FL (ref 78–100)
NT-PROBNP SERPL-MCNC: <36 PG/ML (ref 0–450)
PLATELET # BLD AUTO: 171 10E3/UL (ref 150–450)
POTASSIUM SERPL-SCNC: 3.8 MMOL/L (ref 3.4–5.3)
PROT SERPL-MCNC: 6.9 G/DL (ref 6.4–8.3)
RBC # BLD AUTO: 4.13 10E6/UL (ref 4.4–5.9)
SODIUM SERPL-SCNC: 141 MMOL/L (ref 135–145)
TSH SERPL DL<=0.005 MIU/L-ACNC: 3.08 UIU/ML (ref 0.3–4.2)
WBC # BLD AUTO: 6.1 10E3/UL (ref 4–11)

## 2024-08-28 PROCEDURE — 99213 OFFICE O/P EST LOW 20 MIN: CPT | Performed by: INTERNAL MEDICINE

## 2024-08-28 PROCEDURE — G0463 HOSPITAL OUTPT CLINIC VISIT: HCPCS | Performed by: INTERNAL MEDICINE

## 2024-08-28 PROCEDURE — 80053 COMPREHEN METABOLIC PANEL: CPT | Performed by: PATHOLOGY

## 2024-08-28 PROCEDURE — 84443 ASSAY THYROID STIM HORMONE: CPT | Performed by: PATHOLOGY

## 2024-08-28 PROCEDURE — 36415 COLL VENOUS BLD VENIPUNCTURE: CPT | Performed by: PATHOLOGY

## 2024-08-28 PROCEDURE — 83880 ASSAY OF NATRIURETIC PEPTIDE: CPT | Performed by: PATHOLOGY

## 2024-08-28 PROCEDURE — 85027 COMPLETE CBC AUTOMATED: CPT | Performed by: PATHOLOGY

## 2024-08-28 ASSESSMENT — PAIN SCALES - GENERAL: PAINLEVEL: NO PAIN (0)

## 2024-08-28 NOTE — PATIENT INSTRUCTIONS
Dr. Freitas recommends:    Lab appointment today.     Follow up clinic visit with Dr. Freitas in 4 months.     Thank you for your visit today.  Please call with any questions or concerns.   [phone]

## 2024-08-28 NOTE — NURSING NOTE
Chief Complaint   Patient presents with    Follow Up     8/28/2024 visit with Ethan Freitas MD for RETURN HEART FAILURE - **LABS WILL BE DONE AT CORRECTIONAL FACILITY* Biventricular heart failure (H) [I50.82] [facility] and [facility] facility. Fax number       Vitals were taken and medications reconciled.    Chapito Arboleda, EMT  8:11 AM

## 2024-08-28 NOTE — LETTER
2024      RE: Palomo Patterson  [address] [facility] [address]       Dear Colleague,    Thank you for the opportunity to participate in the care of your patient, Palomo Patterson, at the [facility] HEART CLINIC [facility]. Please see a copy of my visit note below.      .    José Antonio Murillo MD   [address] [address]   FAX:  [phone]         Demetrio Parker MD    [address] [place] [id number] [phone] (Fax)        RE:      Palomo Patterson       MRN:   [id number]   :   1980       IMPRESSION:   1.  Biventricular acute on chronic congestive heart failure.   2.  History of methamphetamine use (recurrent).   A.  History of multiple positive urine tests.   3.  History of smoking.   4.  Family history of atherosclerotic cardiovascular disease.   5.  Family history of sudden death.   6.  History of appendiceal rupture.  7.  CKD 3A  8.  Recent COVID disease    The patient returns for follow-up of heart failure.  There is no interim history of chest pain, tightness, paroxysmal nocturnal dyspnea, orthopnea, peripheral edema, palpitation, pre-syncope, syncope, device discharge.  Exercise tolerance is stable.  The patient is attempting to exercise regularly and following a sodium restricted, calorically appropriate diet.  Medications are reviewed and the patient is taking medications as prescribed.  The patient is generally sleeping well.       Constitutional: weight loss, fever, chills, night sweats  HEENT: without visual changes, swallow difficulties  Pulmonary: without shortness of breath, cough, wheeze, hemoptysis  Cardiac: without chest pain, NEWMAN, PND, orthopnea, edema, palpitation, pre-syncope, syncope,  GI: without diarrhea, constipation, jaundice, melena, GERD, hematemesis  : without frequency, urgency, dysuria, hematuria  Skin: rash, bruise, open lesions  Neuro: without TIA,  focal neurologic complaints, seizure, trauma  Ortho: without pain, swelling, mobility impairment  Endocrine: diabetes, thyroid, heat/cold intolerance, polyuria, polyphagia, change bowel habits.  Sleep:no ZACHARY, periodic breathing, fatigue      Constitutional: alert, oriented, normal gait and station, normal mentation.  Oral: moist mucous membranes  Lymph: without pathologic adenopathy  Chest: clear to ausculation and percussion  Cor: No evidence of left or right ventricular activity.  Rhythm is regular.  S1 normal, S2 split physiologically. Murmurs are not present  Abdomen: without tenderness, rebound, guarding, masses, ascites  Extremities: Edema not present  Neuro: no focal defects, normal mentation  Skin: without open lesions  Psych: oriented, verbal, mental status in tact      me: JAVI CASTELLANO  MRN: [id number]  : 1980  Study Date: 2023 03:38 PM  Age: 42 yrs  Gender: Male  Patient Location: [facility]  Reason For Study: Biventricular heart failure (H)  Ordering Physician: DUSTIN SHAY  Referring Physician: DUSTIN SHAY  Performed By: Santo Castrejon     BSA: 1.8 m2  Height: 63 in  Weight: 175 lb  HR: 99  BP: 133/94 mmHg  ______________________________________________________________________________  Procedure  Echocardiogram with two-dimensional, color and spectral Doppler performed.  ______________________________________________________________________________  Interpretation Summary  Left ventricular size, wall motion and function are normal. The ejection  fraction is 55-60%.     Right ventricular function, chamber size, wall motion, and thickness are  normal.     No significant valvular abnormalities present.     The inferior vena cava is normal.     No pericardial effusion is present.     Compared to prior imaging on 2018 there has been an improvement (to normal)  left and right ventricular  function.  ______________________________________________________________________________  Left Ventricle  Left ventricular size, wall motion and function are normal. The ejection  fraction is 55-60%.     Right Ventricle  Right ventricular function, chamber size, wall motion, and thickness are  normal.     Atria  The right atria appears normal. The left atrium appears normal. The atrial  septum is intact as assessed by color Doppler .     Mitral Valve  The mitral valve is normal. Trace mitral insufficiency is present.     Aortic Valve  Aortic valve is normal in structure and function. The aortic valve is  tricuspid. Trace aortic insufficiency is present.     Tricuspid Valve  The tricuspid valve is normal. The peak velocity of the tricuspid regurgitant  jet is not obtainable. Pulmonary artery systolic pressure cannot be assessed.     Pulmonic Valve  The pulmonic valve is normal.     Vessels  The inferior vena cava is normal. Sinuses of Valsalva 3.3 cm. Ascending aorta  3.1 cm. IVC diameter <2.1 cm collapsing >50% with sniff suggests a normal RA  pressure of 3 mmHg.     Pericardium  No pericardial effusion is present. Prominent epicardial fat is noted.     Miscellaneous  No significant valvular abnormalities present.     Compared to Previous Study  Compared to prior imaging on 8/2018 there has been an improvement (to normal)  left and right ventricular function.  ______________________________________________________________________________  MMode/2D Measurements & Calculations  IVSd: 0.92 cm  LVIDd: 4.2 cm  LVIDs: 2.7 cm  LVPWd: 0.74 cm  FS: 35.4 %  LV mass(C)d: 106.9 grams  LV mass(C)dI: 58.5 grams/m2  Ao root diam: 3.3 cm  asc Aorta Diam: 3.1 cm  LVOT diam: 2.3 cm  LVOT area: 4.0 cm2  LA Volume (BP): 40.8 ml     LA Volume Index (BP): 22.3 ml/m2  RWT: 0.35     Doppler Measurements & Calculations  MV E max tad: 82.0 cm/sec  MV A max tad: 94.1 cm/sec  MV E/A: 0.87  MV dec slope: 621.4 cm/sec2  MV dec time: 0.13  sec  Ao V2 max: 152.0 cm/sec  Ao max P.2 mmHg  Ao V2 mean: 103.4 cm/sec  Ao mean P.9 mmHg  Ao V2 VTI: 28.1 cm  DANIEL(I,D): 3.0 cm2  DANIEL(V,D): 2.7 cm2  LV V1 max P.3 mmHg  LV V1 max: 103.9 cm/sec  LV V1 VTI: 21.4 cm  SV(LVOT): 85.1 ml  SI(LVOT): 46.6 ml/m2  PA V2 max: 116.7 cm/sec  PA max P.5 mmHg  AV Brayden Ratio (DI): 0.68  DANIEL Index (cm2/m2): 1.7  E/E' avg: 10.4  Lateral E/e': 8.4  Medial E/e': 12.4     __________________________________________________________________________      Wt Readings from Last 24 Encounters:   23 84.2 kg (185 lb 9.6 oz)   23 86.8 kg (191 lb 6.4 oz)   23 84.8 kg (187 lb)   18 85.1 kg (187 lb 9.6 oz)   18 79.4 kg (175 lb)   18 78.5 kg (173 lb)   18 79.4 kg (175 lb 1.6 oz)   18 78.9 kg (173 lb 14.4 oz)   18 77 kg (169 lb 12.8 oz)   18 77.6 kg (171 lb)   18 77.9 kg (171 lb 11.2 oz)   18 74.8 kg (165 lb)   04/15/18 73.3 kg (161 lb 9.6 oz)   18 78.6 kg (173 lb 3.2 oz)   18 81.2 kg (179 lb)   18 79.8 kg (176 lb)   18 83.9 kg (185 lb)   18 82.1 kg (181 lb)   17 78.5 kg (173 lb)   16 77.8 kg (171 lb 8 oz)   16 79.8 kg (176 lb)   16 78.5 kg (173 lb)       .    Current Outpatient Medications   Medication Sig Dispense Refill     albuterol (PROAIR HFA/PROVENTIL HFA/VENTOLIN HFA) 108 (90 Base) MCG/ACT inhaler Inhale 1-2 puffs into the lungs every 6 hours as needed for shortness of breath, wheezing or cough Next refills to come from Primary Care doctor. 18 g 0     amLODIPine (NORVASC) 2.5 MG tablet TAKE ONE TABLET BY MOUTH EVERY DAY 90 tablet 1     carvedilol (COREG) 12.5 MG tablet Take 1 tablet (12.5 mg) by mouth 2 times daily (with meals) 180 tablet 0     furosemide (LASIX) 20 MG tablet Take 20mg (1 tablet) once daily as needed for 2-3lb weight increase and symptoms. Please call Heart Failure/CORE clinic if taking. 30 tablet 1     hydrALAZINE (APRESOLINE) 100 MG  tablet Take 1 tablet (100 mg) by mouth 3 times daily 270 tablet 3     isosorbide mononitrate (IMDUR) 60 MG 24 hr tablet TAKE ONE TABLET BY MOUTH EVERY MORNING 90 tablet 3     losartan (COZAAR) 100 MG tablet Take 1 tablet (100 mg) by mouth daily 90 tablet 3     lidocaine-EPINEPHrine 1 %-1:533486 SOLN injection 1 mL by Other route (Patient not taking: Reported on 1/25/2023)       No current facility-administered medications for this visit.                                  Please do not hesitate to contact me if you have any questions/concerns.     Sincerely,     Ethan Freitas MD

## 2024-08-31 ENCOUNTER — TELEPHONE (OUTPATIENT)
Dept: CARDIOLOGY | Facility: CLINIC | Age: 44
End: 2024-08-31
Payer: COMMERCIAL

## 2024-08-31 NOTE — TELEPHONE ENCOUNTER
"Dr. Fortino Puente has requested a follow-up visit on or near 12/28/24. You may have orders for other visits and tests as well.    Please call  opt 1 to schedule these visits.      (If you prefer, some clinics allow you to schedule at [email or web address]. Click the \"Visits\" tab, then choose \"Schedule an Appointment.\")        Sincerely, [facility]   "

## 2024-09-05 ENCOUNTER — TELEPHONE (OUTPATIENT)
Dept: CARDIOLOGY | Facility: CLINIC | Age: 44
End: 2024-09-05
Payer: COMMERCIAL

## 2024-09-05 NOTE — TELEPHONE ENCOUNTER
Left Voicemail (1st Attempt) for the patient to call back and schedule the following:    Appointment type:  rtn ph   Provider: kari  Return date: 12/28/24  Specialty phone number: [phone] opt 1   Additional appointment(s) needed: echo   Additonal Notes: n/a

## 2024-09-05 NOTE — TELEPHONE ENCOUNTER
Per message from Shakeel, wanting echo report faxed to us from [facility]. Called med unit, they transferred call back to medical records. Left voicemail for report to be faxed. 9/5 8:45a CJ

## 2024-09-09 NOTE — TELEPHONE ENCOUNTER
Left Voicemail (2 ndAttempt) for the patient to call back and schedule the following:     Appointment type:  rtn ph   Provider: kari  Return date: 12/28/24  Specialty phone number: [phone] opt 1   Additional appointment(s) needed: echo   Additonal Notes: n/a

## 2025-01-07 ENCOUNTER — PRE VISIT (OUTPATIENT)
Dept: CARDIOLOGY | Facility: CLINIC | Age: 45
End: 2025-01-07

## 2025-01-07 DIAGNOSIS — I50.82 BIVENTRICULAR HEART FAILURE (H): Primary | ICD-10-CM

## 2025-01-12 NOTE — PROGRESS NOTES
.    José Antonio Murillo MD   [address] [address]   FAX:  [phone]         Demetrio Parker MD    [address] [place] [id number]    [phone] (Fax)        RE:      Palomo Patterson       MRN:   [id number]   :   1980       IMPRESSION:   1.  Biventricular acute on chronic congestive heart failure.   2.  History of methamphetamine use (recurrent).   A.  History of multiple positive urine tests.   3.  History of smoking.   4.  Family history of atherosclerotic cardiovascular disease.   5.  Family history of sudden death.   6.  History of appendiceal rupture.  7.  CKD 3A  8.  Recent COVID disease    Plan:  RTC in May with labs and echocardiogram prior to discharge    The patient returns for follow-up of heart failure.  There is no interim history of chest pain, tightness, paroxysmal nocturnal dyspnea, orthopnea, peripheral edema, palpitation, pre-syncope, syncope, device discharge.  Exercise tolerance is stable.  The patient is attempting to exercise regularly and following a sodium restricted, calorically appropriate diet.  Medications are reviewed and the patient is taking medications as prescribed.  The patient is generally sleeping well.       Constitutional: weight loss, fever, chills, night sweats  HEENT: without visual changes, swallow difficulties  Pulmonary: without shortness of breath, cough, wheeze, hemoptysis  Cardiac: without chest pain, NEWMAN, PND, orthopnea, edema, palpitation, pre-syncope, syncope,  GI: without diarrhea, constipation, jaundice, melena, GERD, hematemesis  : without frequency, urgency, dysuria, hematuria  Skin: rash, bruise, open lesions  Neuro: without TIA, focal neurologic complaints, seizure, trauma  Ortho: without pain, swelling, mobility impairment  Endocrine: diabetes, thyroid, heat/cold intolerance, polyuria, polyphagia, change bowel habits.  Sleep:no ZACHARY, periodic breathing, fatigue      Constitutional: alert,  oriented, normal gait and station, normal mentation.  Oral: moist mucous membranes  Lymph: without pathologic adenopathy  Chest: clear to ausculation and percussion  Cor: No evidence of left or right ventricular activity.  Rhythm is regular.  S1 normal, S2 split physiologically. Murmurs are not present  Abdomen: without tenderness, rebound, guarding, masses, ascites  Extremities: Edema not present  Neuro: no focal defects, normal mentation  Skin: without open lesions  Psych: oriented, verbal, mental status in tact      m__________________________________________________________________________      Wt Readings from Last 24 Encounters:   08/28/24 85.2 kg (187 lb 14.4 oz)   09/06/23 84.2 kg (185 lb 9.6 oz)   03/08/23 86.8 kg (191 lb 6.4 oz)   01/25/23 84.8 kg (187 lb)   12/06/18 85.1 kg (187 lb 9.6 oz)   09/18/18 79.4 kg (175 lb)   08/14/18 78.5 kg (173 lb)   07/11/18 79.4 kg (175 lb 1.6 oz)   06/21/18 78.9 kg (173 lb 14.4 oz)   05/30/18 77 kg (169 lb 12.8 oz)   05/11/18 77.6 kg (171 lb)   05/08/18 77.9 kg (171 lb 11.2 oz)   05/01/18 74.8 kg (165 lb)   04/15/18 73.3 kg (161 lb 9.6 oz)   04/05/18 78.6 kg (173 lb 3.2 oz)   03/07/18 81.2 kg (179 lb)   02/27/18 79.8 kg (176 lb)   02/18/18 83.9 kg (185 lb)   01/02/18 82.1 kg (181 lb)   11/01/17 78.5 kg (173 lb)   12/23/16 77.8 kg (171 lb 8 oz)   07/14/16 79.8 kg (176 lb)   06/30/16 78.5 kg (173 lb)      .  Current Outpatient Medications   Medication Sig Dispense Refill    albuterol (PROAIR HFA/PROVENTIL HFA/VENTOLIN HFA) 108 (90 Base) MCG/ACT inhaler Inhale 1-2 puffs into the lungs every 6 hours as needed for shortness of breath, wheezing or cough Next refills to come from Primary Care doctor. 18 g 0    amLODIPine (NORVASC) 2.5 MG tablet TAKE ONE TABLET BY MOUTH EVERY DAY 90 tablet 1    carvedilol (COREG) 12.5 MG tablet Take 1 tablet (12.5 mg) by mouth 2 times daily (with meals) 180 tablet 0    furosemide (LASIX) 20 MG tablet Take 20mg (1 tablet) once daily as needed for  2-3lb weight increase and symptoms. Please call Heart Failure/CORE clinic if taking. 30 tablet 1    hydrALAZINE (APRESOLINE) 100 MG tablet Take 1 tablet (100 mg) by mouth 3 times daily 270 tablet 3    isosorbide mononitrate (IMDUR) 60 MG 24 hr tablet TAKE ONE TABLET BY MOUTH EVERY MORNING 90 tablet 3    losartan (COZAAR) 100 MG tablet Take 1 tablet (100 mg) by mouth daily 90 tablet 3    lidocaine-EPINEPHrine 1 %-1:721245 SOLN injection 1 mL by Other route (Patient not taking: Reported on 1/15/2025)       No current facility-administered medications for this visit.              Name: JAVI CASTELLANO  MRN: [id number]  : 1980  Study Date: 2023 03:38 PM  Age: 42 yrs  Gender: Male  Patient Location: [facility]  Reason For Study: Biventricular heart failure (H)  Ordering Physician: DUSTIN SHAY  Referring Physician: DUSTIN SHAY  Performed By: Santo Castrejon     BSA: 1.8 m2  Height: 63 in  Weight: 175 lb  HR: 99  BP: 133/94 mmHg  ______________________________________________________________________________  Procedure  Echocardiogram with two-dimensional, color and spectral Doppler performed.  ______________________________________________________________________________  Interpretation Summary  Left ventricular size, wall motion and function are normal. The ejection  fraction is 55-60%.     Right ventricular function, chamber size, wall motion, and thickness are  normal.     No significant valvular abnormalities present.     The inferior vena cava is normal.     No pericardial effusion is present.     Compared to prior imaging on 2018 there has been an improvement (to normal)  left and right ventricular function.

## 2025-01-15 ENCOUNTER — OFFICE VISIT (OUTPATIENT)
Dept: CARDIOLOGY | Facility: CLINIC | Age: 45
End: 2025-01-15
Attending: INTERNAL MEDICINE

## 2025-01-15 ENCOUNTER — LAB (OUTPATIENT)
Dept: LAB | Facility: CLINIC | Age: 45
End: 2025-01-15

## 2025-01-15 VITALS — HEART RATE: 67 BPM | SYSTOLIC BLOOD PRESSURE: 112 MMHG | OXYGEN SATURATION: 96 % | DIASTOLIC BLOOD PRESSURE: 80 MMHG

## 2025-01-15 DIAGNOSIS — I50.82 BIVENTRICULAR HEART FAILURE (H): Primary | ICD-10-CM

## 2025-01-15 DIAGNOSIS — I50.82 BIVENTRICULAR HEART FAILURE (H): ICD-10-CM

## 2025-01-15 LAB
ALBUMIN SERPL BCG-MCNC: 4.5 G/DL (ref 3.5–5.2)
ALP SERPL-CCNC: 63 U/L (ref 40–150)
ALT SERPL W P-5'-P-CCNC: 23 U/L (ref 0–70)
ANION GAP SERPL CALCULATED.3IONS-SCNC: 10 MMOL/L (ref 7–15)
AST SERPL W P-5'-P-CCNC: 24 U/L (ref 0–45)
BILIRUB SERPL-MCNC: 1 MG/DL
BUN SERPL-MCNC: 18.7 MG/DL (ref 6–20)
CALCIUM SERPL-MCNC: 9.3 MG/DL (ref 8.8–10.4)
CHLORIDE SERPL-SCNC: 109 MMOL/L (ref 98–107)
CREAT SERPL-MCNC: 1.13 MG/DL (ref 0.67–1.17)
EGFRCR SERPLBLD CKD-EPI 2021: 83 ML/MIN/1.73M2
ERYTHROCYTE [DISTWIDTH] IN BLOOD BY AUTOMATED COUNT: 12.7 % (ref 10–15)
GLUCOSE SERPL-MCNC: 85 MG/DL (ref 70–99)
HCO3 SERPL-SCNC: 22 MMOL/L (ref 22–29)
HCT VFR BLD AUTO: 39 % (ref 40–53)
HGB BLD-MCNC: 13.8 G/DL (ref 13.3–17.7)
MCH RBC QN AUTO: 34.3 PG (ref 26.5–33)
MCHC RBC AUTO-ENTMCNC: 35.4 G/DL (ref 31.5–36.5)
MCV RBC AUTO: 97 FL (ref 78–100)
NT-PROBNP SERPL-MCNC: <36 PG/ML (ref 0–450)
PLATELET # BLD AUTO: 174 10E3/UL (ref 150–450)
POTASSIUM SERPL-SCNC: 4.2 MMOL/L (ref 3.4–5.3)
PROT SERPL-MCNC: 7.1 G/DL (ref 6.4–8.3)
RBC # BLD AUTO: 4.02 10E6/UL (ref 4.4–5.9)
SODIUM SERPL-SCNC: 141 MMOL/L (ref 135–145)
WBC # BLD AUTO: 5.5 10E3/UL (ref 4–11)

## 2025-01-15 PROCEDURE — 85027 COMPLETE CBC AUTOMATED: CPT | Performed by: PATHOLOGY

## 2025-01-15 PROCEDURE — 80053 COMPREHEN METABOLIC PANEL: CPT | Performed by: PATHOLOGY

## 2025-01-15 PROCEDURE — 99211 OFF/OP EST MAY X REQ PHY/QHP: CPT | Performed by: INTERNAL MEDICINE

## 2025-01-15 PROCEDURE — 83880 ASSAY OF NATRIURETIC PEPTIDE: CPT | Performed by: PATHOLOGY

## 2025-01-15 PROCEDURE — 36415 COLL VENOUS BLD VENIPUNCTURE: CPT | Performed by: PATHOLOGY

## 2025-01-15 ASSESSMENT — PAIN SCALES - GENERAL: PAINLEVEL_OUTOF10: NO PAIN (0)

## 2025-01-15 NOTE — NURSING NOTE
Chief Complaint   Patient presents with    Follow Up     1/15/2025 visit with Ethan Freitas MD for RETURN HEART FAILURE - Biventricular heart failure (H) [I50.82], follow up per [facility] [phone] No device Per [facility] facility Records in Epic         Vitals were taken and medications reconciled.    There were no vitals taken for this visit.    Chapito Arboleda, EMT  10:27 AM

## 2025-01-15 NOTE — LETTER
1/15/2025      RE: Palomo Patterson  [address]  [place] [address]       Dear Colleague,    Thank you for the opportunity to participate in the care of your patient, Palomo Patterson, at the [facility] HEART CLINIC [facility]. Please see a copy of my visit note below.      .    José Antonio Murillo MD   [address] [address]   FAX:  [phone]         Demetrio Parker MD    [address] [place] [id number] [phone] (Fax)        RE:      Palomo Patterson       MRN:   [id number]   :   1980       IMPRESSION:   1.  Biventricular acute on chronic congestive heart failure.   2.  History of methamphetamine use (recurrent).   A.  History of multiple positive urine tests.   3.  History of smoking.   4.  Family history of atherosclerotic cardiovascular disease.   5.  Family history of sudden death.   6.  History of appendiceal rupture.  7.  CKD 3A  8.  Recent COVID disease    Plan:  RTC in May with labs and echocardiogram prior to discharge    The patient returns for follow-up of heart failure.  There is no interim history of chest pain, tightness, paroxysmal nocturnal dyspnea, orthopnea, peripheral edema, palpitation, pre-syncope, syncope, device discharge.  Exercise tolerance is stable.  The patient is attempting to exercise regularly and following a sodium restricted, calorically appropriate diet.  Medications are reviewed and the patient is taking medications as prescribed.  The patient is generally sleeping well.       Constitutional: weight loss, fever, chills, night sweats  HEENT: without visual changes, swallow difficulties  Pulmonary: without shortness of breath, cough, wheeze, hemoptysis  Cardiac: without chest pain, NEWMAN, PND, orthopnea, edema, palpitation, pre-syncope, syncope,  GI: without diarrhea, constipation, jaundice, melena, GERD, hematemesis  : without frequency, urgency,  dysuria, hematuria  Skin: rash, bruise, open lesions  Neuro: without TIA, focal neurologic complaints, seizure, trauma  Ortho: without pain, swelling, mobility impairment  Endocrine: diabetes, thyroid, heat/cold intolerance, polyuria, polyphagia, change bowel habits.  Sleep:no ZACHARY, periodic breathing, fatigue      Constitutional: alert, oriented, normal gait and station, normal mentation.  Oral: moist mucous membranes  Lymph: without pathologic adenopathy  Chest: clear to ausculation and percussion  Cor: No evidence of left or right ventricular activity.  Rhythm is regular.  S1 normal, S2 split physiologically. Murmurs are not present  Abdomen: without tenderness, rebound, guarding, masses, ascites  Extremities: Edema not present  Neuro: no focal defects, normal mentation  Skin: without open lesions  Psych: oriented, verbal, mental status in tact      m__________________________________________________________________________      Wt Readings from Last 24 Encounters:   08/28/24 85.2 kg (187 lb 14.4 oz)   09/06/23 84.2 kg (185 lb 9.6 oz)   03/08/23 86.8 kg (191 lb 6.4 oz)   01/25/23 84.8 kg (187 lb)   12/06/18 85.1 kg (187 lb 9.6 oz)   09/18/18 79.4 kg (175 lb)   08/14/18 78.5 kg (173 lb)   07/11/18 79.4 kg (175 lb 1.6 oz)   06/21/18 78.9 kg (173 lb 14.4 oz)   05/30/18 77 kg (169 lb 12.8 oz)   05/11/18 77.6 kg (171 lb)   05/08/18 77.9 kg (171 lb 11.2 oz)   05/01/18 74.8 kg (165 lb)   04/15/18 73.3 kg (161 lb 9.6 oz)   04/05/18 78.6 kg (173 lb 3.2 oz)   03/07/18 81.2 kg (179 lb)   02/27/18 79.8 kg (176 lb)   02/18/18 83.9 kg (185 lb)   01/02/18 82.1 kg (181 lb)   11/01/17 78.5 kg (173 lb)   12/23/16 77.8 kg (171 lb 8 oz)   07/14/16 79.8 kg (176 lb)   06/30/16 78.5 kg (173 lb)      .  Current Outpatient Medications   Medication Sig Dispense Refill     albuterol (PROAIR HFA/PROVENTIL HFA/VENTOLIN HFA) 108 (90 Base) MCG/ACT inhaler Inhale 1-2 puffs into the lungs every 6 hours as needed for shortness of breath, wheezing  or cough Next refills to come from Primary Care doctor. 18 g 0     amLODIPine (NORVASC) 2.5 MG tablet TAKE ONE TABLET BY MOUTH EVERY DAY 90 tablet 1     carvedilol (COREG) 12.5 MG tablet Take 1 tablet (12.5 mg) by mouth 2 times daily (with meals) 180 tablet 0     furosemide (LASIX) 20 MG tablet Take 20mg (1 tablet) once daily as needed for 2-3lb weight increase and symptoms. Please call Heart Failure/CORE clinic if taking. 30 tablet 1     hydrALAZINE (APRESOLINE) 100 MG tablet Take 1 tablet (100 mg) by mouth 3 times daily 270 tablet 3     isosorbide mononitrate (IMDUR) 60 MG 24 hr tablet TAKE ONE TABLET BY MOUTH EVERY MORNING 90 tablet 3     losartan (COZAAR) 100 MG tablet Take 1 tablet (100 mg) by mouth daily 90 tablet 3     lidocaine-EPINEPHrine 1 %-1:823183 SOLN injection 1 mL by Other route (Patient not taking: Reported on 1/15/2025)       No current facility-administered medications for this visit.              Name: JAVI CASTELLANO  MRN: [id number]  : 1980  Study Date: 2023 03:38 PM  Age: 42 yrs  Gender: Male  Patient Location: [facility]  Reason For Study: Biventricular heart failure (H)  Ordering Physician: DUSTIN SHAY  Referring Physician: DUSTIN SHAY  Performed By: Santo Castrejon     BSA: 1.8 m2  Height: 63 in  Weight: 175 lb  HR: 99  BP: 133/94 mmHg  ______________________________________________________________________________  Procedure  Echocardiogram with two-dimensional, color and spectral Doppler performed.  ______________________________________________________________________________  Interpretation Summary  Left ventricular size, wall motion and function are normal. The ejection  fraction is 55-60%.     Right ventricular function, chamber size, wall motion, and thickness are  normal.     No significant valvular abnormalities present.     The inferior vena cava is normal.     No pericardial effusion is present.     Compared to prior imaging on 2018 there has been an  improvement (to normal)  left and right ventricular function.                    Please do not hesitate to contact me if you have any questions/concerns.     Sincerely,     Ethan Freitas MD

## 2025-01-15 NOTE — PATIENT INSTRUCTIONS
"You were seen today in the Cardiovascular Clinic at the [facility].      Cardiology Providers you saw during your visit:  Dr. Ethan Freitas     Recommendations:    Please follow-up with Dr. Freitas in May with another Echocardiogram prior and labs.          Eat a heart healthy, low sodium diet.  Get 20 to 30 minutes of aerobic exercise 4 to 5 times per week as tolerated. (Examples of aerobic exercise include: walking, bicycling, swimming, running).     Thank you for your visit today!   Please MyChart message or call if you have any questions or concerns.      During Business Hours:  [phone], option # 1 ([facility])       After hours, weekends or holidays:   [phone], Option #4  Ask to speak to the On-Call Cardiologist. Inform them you are a heart failure patient at the [facility].      Corina Cuellar RN BSN CHFN  Cardiology Care Coordinator - C.O.R.[facility]  Questions and [phone]  First press #1 for the University and then press #4 for \"Your Care Team\" to reach us Cardiology Nurses.                "

## 2025-01-16 ENCOUNTER — TELEPHONE (OUTPATIENT)
Dept: CARDIOLOGY | Facility: CLINIC | Age: 45
End: 2025-01-16

## 2025-01-21 ENCOUNTER — TELEPHONE (OUTPATIENT)
Dept: CARDIOLOGY | Facility: CLINIC | Age: 45
End: 2025-01-21
Payer: COMMERCIAL

## (undated) RX ORDER — FENTANYL CITRATE 50 UG/ML
INJECTION, SOLUTION INTRAMUSCULAR; INTRAVENOUS
Status: DISPENSED
Start: 2018-04-09

## (undated) RX ORDER — HEPARIN SODIUM 1000 [USP'U]/ML
INJECTION, SOLUTION INTRAVENOUS; SUBCUTANEOUS
Status: DISPENSED
Start: 2018-04-09

## (undated) RX ORDER — ASPIRIN 325 MG
TABLET ORAL
Status: DISPENSED
Start: 2018-04-09

## (undated) RX ORDER — SODIUM CHLORIDE 9 MG/ML
INJECTION, SOLUTION INTRAVENOUS
Status: DISPENSED
Start: 2018-04-09

## (undated) RX ORDER — NITROGLYCERIN 5 MG/ML
VIAL (ML) INTRAVENOUS
Status: DISPENSED
Start: 2018-04-09

## (undated) RX ORDER — LIDOCAINE HYDROCHLORIDE 10 MG/ML
INJECTION, SOLUTION EPIDURAL; INFILTRATION; INTRACAUDAL; PERINEURAL
Status: DISPENSED
Start: 2018-04-09